# Patient Record
Sex: MALE | Race: BLACK OR AFRICAN AMERICAN | Employment: OTHER | URBAN - METROPOLITAN AREA
[De-identification: names, ages, dates, MRNs, and addresses within clinical notes are randomized per-mention and may not be internally consistent; named-entity substitution may affect disease eponyms.]

---

## 2021-01-01 ENCOUNTER — ANESTHESIA (OUTPATIENT)
Dept: ENDOSCOPY | Age: 81
DRG: 242 | End: 2021-01-01
Payer: OTHER GOVERNMENT

## 2021-01-01 ENCOUNTER — APPOINTMENT (OUTPATIENT)
Dept: GENERAL RADIOLOGY | Age: 81
DRG: 242 | End: 2021-01-01
Payer: OTHER GOVERNMENT

## 2021-01-01 ENCOUNTER — NURSE ONLY (OUTPATIENT)
Dept: CARDIOLOGY CLINIC | Age: 81
End: 2021-01-01
Payer: OTHER GOVERNMENT

## 2021-01-01 ENCOUNTER — APPOINTMENT (OUTPATIENT)
Dept: CT IMAGING | Age: 81
DRG: 641 | End: 2021-01-01
Payer: OTHER GOVERNMENT

## 2021-01-01 ENCOUNTER — ANESTHESIA EVENT (OUTPATIENT)
Dept: ENDOSCOPY | Age: 81
DRG: 242 | End: 2021-01-01
Payer: OTHER GOVERNMENT

## 2021-01-01 ENCOUNTER — APPOINTMENT (OUTPATIENT)
Dept: CT IMAGING | Age: 81
DRG: 242 | End: 2021-01-01
Payer: OTHER GOVERNMENT

## 2021-01-01 ENCOUNTER — NURSE ONLY (OUTPATIENT)
Dept: CARDIOLOGY CLINIC | Age: 81
End: 2021-01-01

## 2021-01-01 ENCOUNTER — APPOINTMENT (OUTPATIENT)
Dept: GENERAL RADIOLOGY | Age: 81
DRG: 641 | End: 2021-01-01
Payer: OTHER GOVERNMENT

## 2021-01-01 ENCOUNTER — TELEPHONE (OUTPATIENT)
Dept: CARDIOLOGY CLINIC | Age: 81
End: 2021-01-01

## 2021-01-01 ENCOUNTER — APPOINTMENT (OUTPATIENT)
Dept: MRI IMAGING | Age: 81
DRG: 242 | End: 2021-01-01
Payer: OTHER GOVERNMENT

## 2021-01-01 ENCOUNTER — HOSPITAL ENCOUNTER (INPATIENT)
Age: 81
LOS: 16 days | Discharge: NURSING, REHAB OR LONG-TERM CARE | DRG: 242 | End: 2021-08-02
Attending: EMERGENCY MEDICINE | Admitting: FAMILY MEDICINE
Payer: OTHER GOVERNMENT

## 2021-01-01 ENCOUNTER — NURSE ONLY (OUTPATIENT)
Dept: CARDIOLOGY CLINIC | Age: 81
End: 2021-01-01
Payer: MEDICARE

## 2021-01-01 ENCOUNTER — HOSPITAL ENCOUNTER (INPATIENT)
Age: 81
LOS: 2 days | Discharge: HOME HEALTH | DRG: 641 | End: 2021-04-01
Attending: EMERGENCY MEDICINE | Admitting: INTERNAL MEDICINE
Payer: OTHER GOVERNMENT

## 2021-01-01 VITALS
WEIGHT: 152.12 LBS | RESPIRATION RATE: 16 BRPM | DIASTOLIC BLOOD PRESSURE: 83 MMHG | OXYGEN SATURATION: 97 % | HEART RATE: 103 BPM | SYSTOLIC BLOOD PRESSURE: 131 MMHG | TEMPERATURE: 98.2 F | HEIGHT: 64 IN | BODY MASS INDEX: 25.97 KG/M2

## 2021-01-01 VITALS
SYSTOLIC BLOOD PRESSURE: 130 MMHG | BODY MASS INDEX: 26.27 KG/M2 | TEMPERATURE: 98.3 F | HEIGHT: 64 IN | OXYGEN SATURATION: 96 % | DIASTOLIC BLOOD PRESSURE: 74 MMHG | RESPIRATION RATE: 16 BRPM | WEIGHT: 153.9 LBS | HEART RATE: 57 BPM

## 2021-01-01 VITALS
RESPIRATION RATE: 16 BRPM | SYSTOLIC BLOOD PRESSURE: 107 MMHG | DIASTOLIC BLOOD PRESSURE: 68 MMHG | OXYGEN SATURATION: 99 %

## 2021-01-01 DIAGNOSIS — I44.1 MOBITZ II: ICD-10-CM

## 2021-01-01 DIAGNOSIS — R55 SYNCOPE AND COLLAPSE: Primary | ICD-10-CM

## 2021-01-01 DIAGNOSIS — Z79.01 ANTICOAGULATED: ICD-10-CM

## 2021-01-01 DIAGNOSIS — R55 SYNCOPE AND COLLAPSE: ICD-10-CM

## 2021-01-01 DIAGNOSIS — I44.1 AV BLOCK, MOBITZ 2: ICD-10-CM

## 2021-01-01 DIAGNOSIS — S00.03XA HEMATOMA OF SCALP, INITIAL ENCOUNTER: ICD-10-CM

## 2021-01-01 DIAGNOSIS — R79.89 ELEVATED SERUM CREATININE: ICD-10-CM

## 2021-01-01 DIAGNOSIS — Z95.0 PACEMAKER: ICD-10-CM

## 2021-01-01 DIAGNOSIS — Z95.0 PACEMAKER: Primary | ICD-10-CM

## 2021-01-01 DIAGNOSIS — Z79.01 ANTICOAGULATED ON COUMADIN: ICD-10-CM

## 2021-01-01 DIAGNOSIS — G93.89 BRAIN MASS: ICD-10-CM

## 2021-01-01 LAB
A/G RATIO: 0.8 (ref 1.1–2.2)
A/G RATIO: 1 (ref 1.1–2.2)
A/G RATIO: 1.1 (ref 1.1–2.2)
A/G RATIO: 1.2 (ref 1.1–2.2)
A/G RATIO: 1.3 (ref 1.1–2.2)
ALBUMIN SERPL-MCNC: 3.1 G/DL (ref 3.4–5)
ALBUMIN SERPL-MCNC: 3.3 G/DL (ref 3.4–5)
ALBUMIN SERPL-MCNC: 3.7 G/DL (ref 3.4–5)
ALBUMIN SERPL-MCNC: 3.9 G/DL (ref 3.4–5)
ALBUMIN SERPL-MCNC: 4 G/DL (ref 3.4–5)
ALP BLD-CCNC: 74 U/L (ref 40–129)
ALP BLD-CCNC: 87 U/L (ref 40–129)
ALP BLD-CCNC: 92 U/L (ref 40–129)
ALP BLD-CCNC: 95 U/L (ref 40–129)
ALP BLD-CCNC: 96 U/L (ref 40–129)
ALT SERPL-CCNC: 13 U/L (ref 10–40)
ALT SERPL-CCNC: 16 U/L (ref 10–40)
ALT SERPL-CCNC: 17 U/L (ref 10–40)
ALT SERPL-CCNC: 21 U/L (ref 10–40)
ALT SERPL-CCNC: 23 U/L (ref 10–40)
ANION GAP SERPL CALCULATED.3IONS-SCNC: 10 MMOL/L (ref 3–16)
ANION GAP SERPL CALCULATED.3IONS-SCNC: 11 MMOL/L (ref 3–16)
ANION GAP SERPL CALCULATED.3IONS-SCNC: 12 MMOL/L (ref 3–16)
ANION GAP SERPL CALCULATED.3IONS-SCNC: 12 MMOL/L (ref 3–16)
ANION GAP SERPL CALCULATED.3IONS-SCNC: 13 MMOL/L (ref 3–16)
ANION GAP SERPL CALCULATED.3IONS-SCNC: 14 MMOL/L (ref 3–16)
ANION GAP SERPL CALCULATED.3IONS-SCNC: 15 MMOL/L (ref 3–16)
ANION GAP SERPL CALCULATED.3IONS-SCNC: 6 MMOL/L (ref 3–16)
ANION GAP SERPL CALCULATED.3IONS-SCNC: 7 MMOL/L (ref 3–16)
ANION GAP SERPL CALCULATED.3IONS-SCNC: 8 MMOL/L (ref 3–16)
ANION GAP SERPL CALCULATED.3IONS-SCNC: 9 MMOL/L (ref 3–16)
APTT: 33.9 SEC (ref 24.2–36.2)
AST SERPL-CCNC: 16 U/L (ref 15–37)
AST SERPL-CCNC: 21 U/L (ref 15–37)
AST SERPL-CCNC: 22 U/L (ref 15–37)
AST SERPL-CCNC: 28 U/L (ref 15–37)
AST SERPL-CCNC: 36 U/L (ref 15–37)
BANDED NEUTROPHILS RELATIVE PERCENT: 1 % (ref 0–7)
BASOPHILS ABSOLUTE: 0 K/UL (ref 0–0.2)
BASOPHILS ABSOLUTE: 0.1 K/UL (ref 0–0.2)
BASOPHILS RELATIVE PERCENT: 0 %
BASOPHILS RELATIVE PERCENT: 0.1 %
BASOPHILS RELATIVE PERCENT: 0.4 %
BASOPHILS RELATIVE PERCENT: 0.5 %
BASOPHILS RELATIVE PERCENT: 0.5 %
BASOPHILS RELATIVE PERCENT: 0.6 %
BASOPHILS RELATIVE PERCENT: 0.7 %
BASOPHILS RELATIVE PERCENT: 0.8 %
BASOPHILS RELATIVE PERCENT: 1 %
BASOPHILS RELATIVE PERCENT: 1.1 %
BASOPHILS RELATIVE PERCENT: 1.2 %
BILIRUB SERPL-MCNC: 0.4 MG/DL (ref 0–1)
BILIRUB SERPL-MCNC: 0.5 MG/DL (ref 0–1)
BILIRUB SERPL-MCNC: 0.5 MG/DL (ref 0–1)
BILIRUB SERPL-MCNC: 0.6 MG/DL (ref 0–1)
BILIRUB SERPL-MCNC: 0.7 MG/DL (ref 0–1)
BILIRUBIN URINE: NEGATIVE
BILIRUBIN URINE: NEGATIVE
BLOOD CULTURE, ROUTINE: NORMAL
BLOOD, URINE: NEGATIVE
BLOOD, URINE: NEGATIVE
BUN BLDV-MCNC: 10 MG/DL (ref 7–20)
BUN BLDV-MCNC: 11 MG/DL (ref 7–20)
BUN BLDV-MCNC: 11 MG/DL (ref 7–20)
BUN BLDV-MCNC: 12 MG/DL (ref 7–20)
BUN BLDV-MCNC: 13 MG/DL (ref 7–20)
BUN BLDV-MCNC: 14 MG/DL (ref 7–20)
BUN BLDV-MCNC: 14 MG/DL (ref 7–20)
BUN BLDV-MCNC: 15 MG/DL (ref 7–20)
BUN BLDV-MCNC: 16 MG/DL (ref 7–20)
BUN BLDV-MCNC: 17 MG/DL (ref 7–20)
BUN BLDV-MCNC: 17 MG/DL (ref 7–20)
BUN BLDV-MCNC: 18 MG/DL (ref 7–20)
BUN BLDV-MCNC: 5 MG/DL (ref 7–20)
BUN BLDV-MCNC: 6 MG/DL (ref 7–20)
BUN BLDV-MCNC: 6 MG/DL (ref 7–20)
BUN BLDV-MCNC: 7 MG/DL (ref 7–20)
BUN BLDV-MCNC: 7 MG/DL (ref 7–20)
BUN BLDV-MCNC: 8 MG/DL (ref 7–20)
BUN BLDV-MCNC: 8 MG/DL (ref 7–20)
BUN BLDV-MCNC: 9 MG/DL (ref 7–20)
CALCIUM SERPL-MCNC: 10.2 MG/DL (ref 8.3–10.6)
CALCIUM SERPL-MCNC: 8.4 MG/DL (ref 8.3–10.6)
CALCIUM SERPL-MCNC: 8.5 MG/DL (ref 8.3–10.6)
CALCIUM SERPL-MCNC: 8.6 MG/DL (ref 8.3–10.6)
CALCIUM SERPL-MCNC: 8.8 MG/DL (ref 8.3–10.6)
CALCIUM SERPL-MCNC: 8.8 MG/DL (ref 8.3–10.6)
CALCIUM SERPL-MCNC: 8.9 MG/DL (ref 8.3–10.6)
CALCIUM SERPL-MCNC: 9 MG/DL (ref 8.3–10.6)
CALCIUM SERPL-MCNC: 9.1 MG/DL (ref 8.3–10.6)
CALCIUM SERPL-MCNC: 9.1 MG/DL (ref 8.3–10.6)
CALCIUM SERPL-MCNC: 9.2 MG/DL (ref 8.3–10.6)
CALCIUM SERPL-MCNC: 9.2 MG/DL (ref 8.3–10.6)
CALCIUM SERPL-MCNC: 9.3 MG/DL (ref 8.3–10.6)
CALCIUM SERPL-MCNC: 9.4 MG/DL (ref 8.3–10.6)
CALCIUM SERPL-MCNC: 9.5 MG/DL (ref 8.3–10.6)
CHLORIDE BLD-SCNC: 100 MMOL/L (ref 99–110)
CHLORIDE BLD-SCNC: 101 MMOL/L (ref 99–110)
CHLORIDE BLD-SCNC: 102 MMOL/L (ref 99–110)
CHLORIDE BLD-SCNC: 102 MMOL/L (ref 99–110)
CHLORIDE BLD-SCNC: 103 MMOL/L (ref 99–110)
CHLORIDE BLD-SCNC: 104 MMOL/L (ref 99–110)
CHLORIDE BLD-SCNC: 105 MMOL/L (ref 99–110)
CHLORIDE BLD-SCNC: 106 MMOL/L (ref 99–110)
CHLORIDE BLD-SCNC: 107 MMOL/L (ref 99–110)
CHLORIDE BLD-SCNC: 97 MMOL/L (ref 99–110)
CHLORIDE BLD-SCNC: 98 MMOL/L (ref 99–110)
CLARITY: CLEAR
CLARITY: CLEAR
CO2: 20 MMOL/L (ref 21–32)
CO2: 21 MMOL/L (ref 21–32)
CO2: 22 MMOL/L (ref 21–32)
CO2: 22 MMOL/L (ref 21–32)
CO2: 23 MMOL/L (ref 21–32)
CO2: 24 MMOL/L (ref 21–32)
CO2: 24 MMOL/L (ref 21–32)
CO2: 25 MMOL/L (ref 21–32)
CO2: 25 MMOL/L (ref 21–32)
CO2: 26 MMOL/L (ref 21–32)
CO2: 27 MMOL/L (ref 21–32)
COLOR: YELLOW
COLOR: YELLOW
COMMENT UA: NORMAL
CREAT SERPL-MCNC: 1 MG/DL (ref 0.8–1.3)
CREAT SERPL-MCNC: 1.1 MG/DL (ref 0.8–1.3)
CREAT SERPL-MCNC: 1.2 MG/DL (ref 0.8–1.3)
CREAT SERPL-MCNC: 1.3 MG/DL (ref 0.8–1.3)
CREAT SERPL-MCNC: 1.4 MG/DL (ref 0.8–1.3)
CREAT SERPL-MCNC: 1.4 MG/DL (ref 0.8–1.3)
CREAT SERPL-MCNC: 1.5 MG/DL (ref 0.8–1.3)
CREAT SERPL-MCNC: 1.7 MG/DL (ref 0.8–1.3)
CULTURE, BLOOD 2: NORMAL
EKG ATRIAL RATE: 54 BPM
EKG ATRIAL RATE: 73 BPM
EKG ATRIAL RATE: 86 BPM
EKG DIAGNOSIS: NORMAL
EKG P AXIS: 0 DEGREES
EKG P AXIS: 2 DEGREES
EKG P AXIS: 3 DEGREES
EKG P-R INTERVAL: 246 MS
EKG P-R INTERVAL: 272 MS
EKG P-R INTERVAL: 274 MS
EKG Q-T INTERVAL: 380 MS
EKG Q-T INTERVAL: 388 MS
EKG Q-T INTERVAL: 442 MS
EKG QRS DURATION: 126 MS
EKG QRS DURATION: 128 MS
EKG QRS DURATION: 128 MS
EKG QTC CALCULATION (BAZETT): 419 MS
EKG QTC CALCULATION (BAZETT): 427 MS
EKG QTC CALCULATION (BAZETT): 454 MS
EKG R AXIS: -20 DEGREES
EKG R AXIS: -24 DEGREES
EKG R AXIS: -31 DEGREES
EKG T AXIS: 132 DEGREES
EKG T AXIS: 136 DEGREES
EKG T AXIS: 147 DEGREES
EKG VENTRICULAR RATE: 54 BPM
EKG VENTRICULAR RATE: 73 BPM
EKG VENTRICULAR RATE: 86 BPM
EOSINOPHILS ABSOLUTE: 0 K/UL (ref 0–0.6)
EOSINOPHILS ABSOLUTE: 0.1 K/UL (ref 0–0.6)
EOSINOPHILS ABSOLUTE: 0.2 K/UL (ref 0–0.6)
EOSINOPHILS ABSOLUTE: 0.3 K/UL (ref 0–0.6)
EOSINOPHILS ABSOLUTE: 0.3 K/UL (ref 0–0.6)
EOSINOPHILS RELATIVE PERCENT: 0 %
EOSINOPHILS RELATIVE PERCENT: 0.1 %
EOSINOPHILS RELATIVE PERCENT: 0.1 %
EOSINOPHILS RELATIVE PERCENT: 0.7 %
EOSINOPHILS RELATIVE PERCENT: 1.4 %
EOSINOPHILS RELATIVE PERCENT: 1.8 %
EOSINOPHILS RELATIVE PERCENT: 2 %
EOSINOPHILS RELATIVE PERCENT: 2.2 %
EOSINOPHILS RELATIVE PERCENT: 2.3 %
EOSINOPHILS RELATIVE PERCENT: 2.8 %
EOSINOPHILS RELATIVE PERCENT: 3 %
EOSINOPHILS RELATIVE PERCENT: 3.1 %
EOSINOPHILS RELATIVE PERCENT: 3.4 %
EOSINOPHILS RELATIVE PERCENT: 3.6 %
EOSINOPHILS RELATIVE PERCENT: 3.8 %
EPITHELIAL CELLS, UA: 1 /HPF (ref 0–5)
GFR AFRICAN AMERICAN: 47
GFR AFRICAN AMERICAN: 54
GFR AFRICAN AMERICAN: 59
GFR AFRICAN AMERICAN: 59
GFR AFRICAN AMERICAN: >60
GFR NON-AFRICAN AMERICAN: 39
GFR NON-AFRICAN AMERICAN: 45
GFR NON-AFRICAN AMERICAN: 48
GFR NON-AFRICAN AMERICAN: 48
GFR NON-AFRICAN AMERICAN: 53
GFR NON-AFRICAN AMERICAN: 58
GFR NON-AFRICAN AMERICAN: >60
GLOBULIN: 2.9 G/DL
GLOBULIN: 3 G/DL
GLOBULIN: 3.3 G/DL
GLOBULIN: 3.7 G/DL
GLOBULIN: 3.7 G/DL
GLUCOSE BLD-MCNC: 100 MG/DL (ref 70–99)
GLUCOSE BLD-MCNC: 100 MG/DL (ref 70–99)
GLUCOSE BLD-MCNC: 101 MG/DL (ref 70–99)
GLUCOSE BLD-MCNC: 103 MG/DL (ref 70–99)
GLUCOSE BLD-MCNC: 104 MG/DL (ref 70–99)
GLUCOSE BLD-MCNC: 105 MG/DL (ref 70–99)
GLUCOSE BLD-MCNC: 106 MG/DL (ref 70–99)
GLUCOSE BLD-MCNC: 108 MG/DL (ref 70–99)
GLUCOSE BLD-MCNC: 108 MG/DL (ref 70–99)
GLUCOSE BLD-MCNC: 109 MG/DL (ref 70–99)
GLUCOSE BLD-MCNC: 110 MG/DL (ref 70–99)
GLUCOSE BLD-MCNC: 111 MG/DL (ref 70–99)
GLUCOSE BLD-MCNC: 113 MG/DL (ref 70–99)
GLUCOSE BLD-MCNC: 115 MG/DL (ref 70–99)
GLUCOSE BLD-MCNC: 115 MG/DL (ref 70–99)
GLUCOSE BLD-MCNC: 121 MG/DL (ref 70–99)
GLUCOSE BLD-MCNC: 127 MG/DL (ref 70–99)
GLUCOSE BLD-MCNC: 134 MG/DL (ref 70–99)
GLUCOSE BLD-MCNC: 152 MG/DL (ref 70–99)
GLUCOSE BLD-MCNC: 168 MG/DL (ref 70–99)
GLUCOSE BLD-MCNC: 45 MG/DL (ref 70–99)
GLUCOSE BLD-MCNC: 45 MG/DL (ref 70–99)
GLUCOSE BLD-MCNC: 48 MG/DL (ref 70–99)
GLUCOSE BLD-MCNC: 61 MG/DL (ref 70–99)
GLUCOSE BLD-MCNC: 61 MG/DL (ref 70–99)
GLUCOSE BLD-MCNC: 67 MG/DL (ref 70–99)
GLUCOSE BLD-MCNC: 68 MG/DL (ref 70–99)
GLUCOSE BLD-MCNC: 68 MG/DL (ref 70–99)
GLUCOSE BLD-MCNC: 74 MG/DL (ref 70–99)
GLUCOSE BLD-MCNC: 75 MG/DL (ref 70–99)
GLUCOSE BLD-MCNC: 75 MG/DL (ref 70–99)
GLUCOSE BLD-MCNC: 76 MG/DL (ref 70–99)
GLUCOSE BLD-MCNC: 77 MG/DL (ref 70–99)
GLUCOSE BLD-MCNC: 78 MG/DL (ref 70–99)
GLUCOSE BLD-MCNC: 79 MG/DL (ref 70–99)
GLUCOSE BLD-MCNC: 81 MG/DL (ref 70–99)
GLUCOSE BLD-MCNC: 82 MG/DL (ref 70–99)
GLUCOSE BLD-MCNC: 83 MG/DL (ref 70–99)
GLUCOSE BLD-MCNC: 83 MG/DL (ref 70–99)
GLUCOSE BLD-MCNC: 86 MG/DL (ref 70–99)
GLUCOSE BLD-MCNC: 87 MG/DL (ref 70–99)
GLUCOSE BLD-MCNC: 87 MG/DL (ref 70–99)
GLUCOSE BLD-MCNC: 88 MG/DL (ref 70–99)
GLUCOSE BLD-MCNC: 89 MG/DL (ref 70–99)
GLUCOSE BLD-MCNC: 89 MG/DL (ref 70–99)
GLUCOSE BLD-MCNC: 90 MG/DL (ref 70–99)
GLUCOSE BLD-MCNC: 90 MG/DL (ref 70–99)
GLUCOSE BLD-MCNC: 91 MG/DL (ref 70–99)
GLUCOSE BLD-MCNC: 91 MG/DL (ref 70–99)
GLUCOSE BLD-MCNC: 93 MG/DL (ref 70–99)
GLUCOSE BLD-MCNC: 94 MG/DL (ref 70–99)
GLUCOSE BLD-MCNC: 95 MG/DL (ref 70–99)
GLUCOSE BLD-MCNC: 95 MG/DL (ref 70–99)
GLUCOSE BLD-MCNC: 96 MG/DL (ref 70–99)
GLUCOSE BLD-MCNC: 96 MG/DL (ref 70–99)
GLUCOSE BLD-MCNC: 97 MG/DL (ref 70–99)
GLUCOSE BLD-MCNC: 99 MG/DL (ref 70–99)
GLUCOSE URINE: 100 MG/DL
GLUCOSE URINE: NEGATIVE MG/DL
HCT VFR BLD CALC: 34.9 % (ref 40.5–52.5)
HCT VFR BLD CALC: 34.9 % (ref 40.5–52.5)
HCT VFR BLD CALC: 36.1 % (ref 40.5–52.5)
HCT VFR BLD CALC: 36.5 % (ref 40.5–52.5)
HCT VFR BLD CALC: 36.9 % (ref 40.5–52.5)
HCT VFR BLD CALC: 37.4 % (ref 40.5–52.5)
HCT VFR BLD CALC: 37.8 % (ref 40.5–52.5)
HCT VFR BLD CALC: 38.2 % (ref 40.5–52.5)
HCT VFR BLD CALC: 39.1 % (ref 40.5–52.5)
HCT VFR BLD CALC: 39.1 % (ref 40.5–52.5)
HCT VFR BLD CALC: 39.4 % (ref 40.5–52.5)
HCT VFR BLD CALC: 39.7 % (ref 40.5–52.5)
HCT VFR BLD CALC: 39.7 % (ref 40.5–52.5)
HCT VFR BLD CALC: 40.3 % (ref 40.5–52.5)
HCT VFR BLD CALC: 41.3 % (ref 40.5–52.5)
HCT VFR BLD CALC: 42.5 % (ref 40.5–52.5)
HEMOGLOBIN: 12.1 G/DL (ref 13.5–17.5)
HEMOGLOBIN: 12.2 G/DL (ref 13.5–17.5)
HEMOGLOBIN: 12.4 G/DL (ref 13.5–17.5)
HEMOGLOBIN: 12.4 G/DL (ref 13.5–17.5)
HEMOGLOBIN: 12.6 G/DL (ref 13.5–17.5)
HEMOGLOBIN: 12.6 G/DL (ref 13.5–17.5)
HEMOGLOBIN: 12.8 G/DL (ref 13.5–17.5)
HEMOGLOBIN: 13.1 G/DL (ref 13.5–17.5)
HEMOGLOBIN: 13.4 G/DL (ref 13.5–17.5)
HEMOGLOBIN: 13.6 G/DL (ref 13.5–17.5)
HEMOGLOBIN: 13.6 G/DL (ref 13.5–17.5)
HEMOGLOBIN: 13.8 G/DL (ref 13.5–17.5)
HEMOGLOBIN: 14 G/DL (ref 13.5–17.5)
HEMOGLOBIN: 14.3 G/DL (ref 13.5–17.5)
HEMOGLOBIN: 14.3 G/DL (ref 13.5–17.5)
HEMOGLOBIN: 14.8 G/DL (ref 13.5–17.5)
HYALINE CASTS: 0 /LPF (ref 0–8)
INR BLD: 1.66 (ref 0.86–1.14)
INR BLD: 1.86 (ref 0.86–1.14)
INR BLD: 1.99 (ref 0.86–1.14)
KETONES, URINE: 40 MG/DL
KETONES, URINE: NEGATIVE MG/DL
L. PNEUMOPHILA SEROGP 1 UR AG: NORMAL
LEUKOCYTE ESTERASE, URINE: NEGATIVE
LEUKOCYTE ESTERASE, URINE: NEGATIVE
LV EF: 45 %
LV EF: 63 %
LVEF MODALITY: NORMAL
LVEF MODALITY: NORMAL
LYMPHOCYTES ABSOLUTE: 1.2 K/UL (ref 1–5.1)
LYMPHOCYTES ABSOLUTE: 1.2 K/UL (ref 1–5.1)
LYMPHOCYTES ABSOLUTE: 1.3 K/UL (ref 1–5.1)
LYMPHOCYTES ABSOLUTE: 1.3 K/UL (ref 1–5.1)
LYMPHOCYTES ABSOLUTE: 1.5 K/UL (ref 1–5.1)
LYMPHOCYTES ABSOLUTE: 1.6 K/UL (ref 1–5.1)
LYMPHOCYTES ABSOLUTE: 1.6 K/UL (ref 1–5.1)
LYMPHOCYTES ABSOLUTE: 1.7 K/UL (ref 1–5.1)
LYMPHOCYTES ABSOLUTE: 1.7 K/UL (ref 1–5.1)
LYMPHOCYTES ABSOLUTE: 1.8 K/UL (ref 1–5.1)
LYMPHOCYTES ABSOLUTE: 1.9 K/UL (ref 1–5.1)
LYMPHOCYTES ABSOLUTE: 2.1 K/UL (ref 1–5.1)
LYMPHOCYTES ABSOLUTE: 2.9 K/UL (ref 1–5.1)
LYMPHOCYTES RELATIVE PERCENT: 15.5 %
LYMPHOCYTES RELATIVE PERCENT: 18 %
LYMPHOCYTES RELATIVE PERCENT: 18.1 %
LYMPHOCYTES RELATIVE PERCENT: 19 %
LYMPHOCYTES RELATIVE PERCENT: 20 %
LYMPHOCYTES RELATIVE PERCENT: 20.1 %
LYMPHOCYTES RELATIVE PERCENT: 20.4 %
LYMPHOCYTES RELATIVE PERCENT: 21.8 %
LYMPHOCYTES RELATIVE PERCENT: 22.3 %
LYMPHOCYTES RELATIVE PERCENT: 24.3 %
LYMPHOCYTES RELATIVE PERCENT: 24.6 %
LYMPHOCYTES RELATIVE PERCENT: 25.2 %
LYMPHOCYTES RELATIVE PERCENT: 25.2 %
LYMPHOCYTES RELATIVE PERCENT: 30.9 %
LYMPHOCYTES RELATIVE PERCENT: 47.6 %
MAGNESIUM: 1.4 MG/DL (ref 1.8–2.4)
MAGNESIUM: 1.7 MG/DL (ref 1.8–2.4)
MAGNESIUM: 1.8 MG/DL (ref 1.8–2.4)
MAGNESIUM: 1.9 MG/DL (ref 1.8–2.4)
MAGNESIUM: 2 MG/DL (ref 1.8–2.4)
MAGNESIUM: 2.2 MG/DL (ref 1.8–2.4)
MCH RBC QN AUTO: 31.8 PG (ref 26–34)
MCH RBC QN AUTO: 31.8 PG (ref 26–34)
MCH RBC QN AUTO: 31.9 PG (ref 26–34)
MCH RBC QN AUTO: 32 PG (ref 26–34)
MCH RBC QN AUTO: 32 PG (ref 26–34)
MCH RBC QN AUTO: 32.1 PG (ref 26–34)
MCH RBC QN AUTO: 32.1 PG (ref 26–34)
MCH RBC QN AUTO: 32.2 PG (ref 26–34)
MCH RBC QN AUTO: 32.3 PG (ref 26–34)
MCH RBC QN AUTO: 32.5 PG (ref 26–34)
MCH RBC QN AUTO: 32.5 PG (ref 26–34)
MCH RBC QN AUTO: 32.7 PG (ref 26–34)
MCH RBC QN AUTO: 32.7 PG (ref 26–34)
MCH RBC QN AUTO: 32.8 PG (ref 26–34)
MCHC RBC AUTO-ENTMCNC: 33.6 G/DL (ref 31–36)
MCHC RBC AUTO-ENTMCNC: 33.7 G/DL (ref 31–36)
MCHC RBC AUTO-ENTMCNC: 34 G/DL (ref 31–36)
MCHC RBC AUTO-ENTMCNC: 34.2 G/DL (ref 31–36)
MCHC RBC AUTO-ENTMCNC: 34.3 G/DL (ref 31–36)
MCHC RBC AUTO-ENTMCNC: 34.3 G/DL (ref 31–36)
MCHC RBC AUTO-ENTMCNC: 34.4 G/DL (ref 31–36)
MCHC RBC AUTO-ENTMCNC: 34.5 G/DL (ref 31–36)
MCHC RBC AUTO-ENTMCNC: 34.6 G/DL (ref 31–36)
MCHC RBC AUTO-ENTMCNC: 34.7 G/DL (ref 31–36)
MCHC RBC AUTO-ENTMCNC: 34.9 G/DL (ref 31–36)
MCHC RBC AUTO-ENTMCNC: 35.2 G/DL (ref 31–36)
MCHC RBC AUTO-ENTMCNC: 35.2 G/DL (ref 31–36)
MCHC RBC AUTO-ENTMCNC: 35.5 G/DL (ref 31–36)
MCV RBC AUTO: 91.9 FL (ref 80–100)
MCV RBC AUTO: 92.3 FL (ref 80–100)
MCV RBC AUTO: 92.5 FL (ref 80–100)
MCV RBC AUTO: 92.5 FL (ref 80–100)
MCV RBC AUTO: 92.7 FL (ref 80–100)
MCV RBC AUTO: 92.8 FL (ref 80–100)
MCV RBC AUTO: 92.8 FL (ref 80–100)
MCV RBC AUTO: 92.9 FL (ref 80–100)
MCV RBC AUTO: 92.9 FL (ref 80–100)
MCV RBC AUTO: 93.3 FL (ref 80–100)
MCV RBC AUTO: 93.5 FL (ref 80–100)
MCV RBC AUTO: 93.9 FL (ref 80–100)
MCV RBC AUTO: 94.3 FL (ref 80–100)
MCV RBC AUTO: 94.5 FL (ref 80–100)
MCV RBC AUTO: 94.7 FL (ref 80–100)
MCV RBC AUTO: 94.8 FL (ref 80–100)
MICROSCOPIC EXAMINATION: ABNORMAL
MICROSCOPIC EXAMINATION: YES
MONOCYTES ABSOLUTE: 0.4 K/UL (ref 0–1.3)
MONOCYTES ABSOLUTE: 0.5 K/UL (ref 0–1.3)
MONOCYTES ABSOLUTE: 0.5 K/UL (ref 0–1.3)
MONOCYTES ABSOLUTE: 0.6 K/UL (ref 0–1.3)
MONOCYTES ABSOLUTE: 0.7 K/UL (ref 0–1.3)
MONOCYTES ABSOLUTE: 0.7 K/UL (ref 0–1.3)
MONOCYTES ABSOLUTE: 0.8 K/UL (ref 0–1.3)
MONOCYTES ABSOLUTE: 0.9 K/UL (ref 0–1.3)
MONOCYTES ABSOLUTE: 1 K/UL (ref 0–1.3)
MONOCYTES RELATIVE PERCENT: 10.1 %
MONOCYTES RELATIVE PERCENT: 10.2 %
MONOCYTES RELATIVE PERCENT: 11.4 %
MONOCYTES RELATIVE PERCENT: 12.9 %
MONOCYTES RELATIVE PERCENT: 6 %
MONOCYTES RELATIVE PERCENT: 6 %
MONOCYTES RELATIVE PERCENT: 6.1 %
MONOCYTES RELATIVE PERCENT: 7.1 %
MONOCYTES RELATIVE PERCENT: 7.2 %
MONOCYTES RELATIVE PERCENT: 8.3 %
MONOCYTES RELATIVE PERCENT: 8.4 %
MONOCYTES RELATIVE PERCENT: 9.2 %
MONOCYTES RELATIVE PERCENT: 9.4 %
MONOCYTES RELATIVE PERCENT: 9.7 %
MONOCYTES RELATIVE PERCENT: 9.8 %
MRSA SCREEN RT-PCR: NORMAL
NEUTROPHILS ABSOLUTE: 2.7 K/UL (ref 1.7–7.7)
NEUTROPHILS ABSOLUTE: 3.3 K/UL (ref 1.7–7.7)
NEUTROPHILS ABSOLUTE: 3.7 K/UL (ref 1.7–7.7)
NEUTROPHILS ABSOLUTE: 3.8 K/UL (ref 1.7–7.7)
NEUTROPHILS ABSOLUTE: 3.8 K/UL (ref 1.7–7.7)
NEUTROPHILS ABSOLUTE: 4.3 K/UL (ref 1.7–7.7)
NEUTROPHILS ABSOLUTE: 4.4 K/UL (ref 1.7–7.7)
NEUTROPHILS ABSOLUTE: 4.4 K/UL (ref 1.7–7.7)
NEUTROPHILS ABSOLUTE: 4.8 K/UL (ref 1.7–7.7)
NEUTROPHILS ABSOLUTE: 5.5 K/UL (ref 1.7–7.7)
NEUTROPHILS ABSOLUTE: 5.6 K/UL (ref 1.7–7.7)
NEUTROPHILS ABSOLUTE: 5.7 K/UL (ref 1.7–7.7)
NEUTROPHILS ABSOLUTE: 6.3 K/UL (ref 1.7–7.7)
NEUTROPHILS ABSOLUTE: 6.3 K/UL (ref 1.7–7.7)
NEUTROPHILS ABSOLUTE: 6.4 K/UL (ref 1.7–7.7)
NEUTROPHILS RELATIVE PERCENT: 44.3 %
NEUTROPHILS RELATIVE PERCENT: 57.5 %
NEUTROPHILS RELATIVE PERCENT: 59 %
NEUTROPHILS RELATIVE PERCENT: 62.4 %
NEUTROPHILS RELATIVE PERCENT: 62.4 %
NEUTROPHILS RELATIVE PERCENT: 65.1 %
NEUTROPHILS RELATIVE PERCENT: 65.2 %
NEUTROPHILS RELATIVE PERCENT: 66.9 %
NEUTROPHILS RELATIVE PERCENT: 68.1 %
NEUTROPHILS RELATIVE PERCENT: 69.1 %
NEUTROPHILS RELATIVE PERCENT: 69.3 %
NEUTROPHILS RELATIVE PERCENT: 69.4 %
NEUTROPHILS RELATIVE PERCENT: 71.1 %
NEUTROPHILS RELATIVE PERCENT: 71.2 %
NEUTROPHILS RELATIVE PERCENT: 73 %
NITRITE, URINE: NEGATIVE
NITRITE, URINE: NEGATIVE
PDW BLD-RTO: 13.4 % (ref 12.4–15.4)
PDW BLD-RTO: 13.5 % (ref 12.4–15.4)
PDW BLD-RTO: 13.6 % (ref 12.4–15.4)
PDW BLD-RTO: 13.7 % (ref 12.4–15.4)
PDW BLD-RTO: 14.1 % (ref 12.4–15.4)
PDW BLD-RTO: 14.2 % (ref 12.4–15.4)
PDW BLD-RTO: 14.2 % (ref 12.4–15.4)
PDW BLD-RTO: 14.6 % (ref 12.4–15.4)
PERFORMED ON: ABNORMAL
PERFORMED ON: NORMAL
PH UA: 5.5 (ref 5–8)
PH UA: 6 (ref 5–8)
PHOSPHORUS: 2.5 MG/DL (ref 2.5–4.9)
PHOSPHORUS: 3 MG/DL (ref 2.5–4.9)
PHOSPHORUS: 3 MG/DL (ref 2.5–4.9)
PLATELET # BLD: 112 K/UL (ref 135–450)
PLATELET # BLD: 120 K/UL (ref 135–450)
PLATELET # BLD: 122 K/UL (ref 135–450)
PLATELET # BLD: 123 K/UL (ref 135–450)
PLATELET # BLD: 128 K/UL (ref 135–450)
PLATELET # BLD: 147 K/UL (ref 135–450)
PLATELET # BLD: 150 K/UL (ref 135–450)
PLATELET # BLD: 153 K/UL (ref 135–450)
PLATELET # BLD: 162 K/UL (ref 135–450)
PLATELET # BLD: 167 K/UL (ref 135–450)
PLATELET # BLD: 176 K/UL (ref 135–450)
PLATELET # BLD: 177 K/UL (ref 135–450)
PLATELET # BLD: 177 K/UL (ref 135–450)
PLATELET # BLD: 178 K/UL (ref 135–450)
PLATELET # BLD: 205 K/UL (ref 135–450)
PLATELET # BLD: 329 K/UL (ref 135–450)
PMV BLD AUTO: 6.6 FL (ref 5–10.5)
PMV BLD AUTO: 6.7 FL (ref 5–10.5)
PMV BLD AUTO: 6.8 FL (ref 5–10.5)
PMV BLD AUTO: 6.9 FL (ref 5–10.5)
PMV BLD AUTO: 7 FL (ref 5–10.5)
PMV BLD AUTO: 7.1 FL (ref 5–10.5)
PMV BLD AUTO: 7.3 FL (ref 5–10.5)
PMV BLD AUTO: 7.4 FL (ref 5–10.5)
PMV BLD AUTO: 7.5 FL (ref 5–10.5)
PMV BLD AUTO: 7.5 FL (ref 5–10.5)
PMV BLD AUTO: 7.6 FL (ref 5–10.5)
PMV BLD AUTO: 7.6 FL (ref 5–10.5)
PMV BLD AUTO: 7.7 FL (ref 5–10.5)
POTASSIUM REFLEX MAGNESIUM: 3.8 MMOL/L (ref 3.5–5.1)
POTASSIUM REFLEX MAGNESIUM: 4.3 MMOL/L (ref 3.5–5.1)
POTASSIUM SERPL-SCNC: 3.3 MMOL/L (ref 3.5–5.1)
POTASSIUM SERPL-SCNC: 3.4 MMOL/L (ref 3.5–5.1)
POTASSIUM SERPL-SCNC: 3.5 MMOL/L (ref 3.5–5.1)
POTASSIUM SERPL-SCNC: 3.5 MMOL/L (ref 3.5–5.1)
POTASSIUM SERPL-SCNC: 3.6 MMOL/L (ref 3.5–5.1)
POTASSIUM SERPL-SCNC: 3.7 MMOL/L (ref 3.5–5.1)
POTASSIUM SERPL-SCNC: 3.8 MMOL/L (ref 3.5–5.1)
POTASSIUM SERPL-SCNC: 3.9 MMOL/L (ref 3.5–5.1)
POTASSIUM SERPL-SCNC: 4 MMOL/L (ref 3.5–5.1)
POTASSIUM SERPL-SCNC: 4.1 MMOL/L (ref 3.5–5.1)
POTASSIUM SERPL-SCNC: 4.2 MMOL/L (ref 3.5–5.1)
POTASSIUM SERPL-SCNC: 4.3 MMOL/L (ref 3.5–5.1)
POTASSIUM SERPL-SCNC: 4.3 MMOL/L (ref 3.5–5.1)
POTASSIUM SERPL-SCNC: 4.9 MMOL/L (ref 3.5–5.1)
PRO-BNP: 147 PG/ML (ref 0–449)
PROCALCITONIN: 0.13 NG/ML (ref 0–0.15)
PROTEIN UA: 30 MG/DL
PROTEIN UA: NEGATIVE MG/DL
PROTHROMBIN TIME: 19.4 SEC (ref 10–13.2)
PROTHROMBIN TIME: 21.7 SEC (ref 10–13.2)
PROTHROMBIN TIME: 23.3 SEC (ref 10–13.2)
RBC # BLD: 3.77 M/UL (ref 4.2–5.9)
RBC # BLD: 3.77 M/UL (ref 4.2–5.9)
RBC # BLD: 3.89 M/UL (ref 4.2–5.9)
RBC # BLD: 3.9 M/UL (ref 4.2–5.9)
RBC # BLD: 3.95 M/UL (ref 4.2–5.9)
RBC # BLD: 3.96 M/UL (ref 4.2–5.9)
RBC # BLD: 4 M/UL (ref 4.2–5.9)
RBC # BLD: 4.03 M/UL (ref 4.2–5.9)
RBC # BLD: 4.17 M/UL (ref 4.2–5.9)
RBC # BLD: 4.22 M/UL (ref 4.2–5.9)
RBC # BLD: 4.24 M/UL (ref 4.2–5.9)
RBC # BLD: 4.25 M/UL (ref 4.2–5.9)
RBC # BLD: 4.28 M/UL (ref 4.2–5.9)
RBC # BLD: 4.38 M/UL (ref 4.2–5.9)
RBC # BLD: 4.45 M/UL (ref 4.2–5.9)
RBC # BLD: 4.55 M/UL (ref 4.2–5.9)
RBC UA: 3 /HPF (ref 0–4)
SARS-COV-2, NAAT: NOT DETECTED
SODIUM BLD-SCNC: 132 MMOL/L (ref 136–145)
SODIUM BLD-SCNC: 133 MMOL/L (ref 136–145)
SODIUM BLD-SCNC: 134 MMOL/L (ref 136–145)
SODIUM BLD-SCNC: 135 MMOL/L (ref 136–145)
SODIUM BLD-SCNC: 136 MMOL/L (ref 136–145)
SODIUM BLD-SCNC: 137 MMOL/L (ref 136–145)
SODIUM BLD-SCNC: 137 MMOL/L (ref 136–145)
SODIUM BLD-SCNC: 138 MMOL/L (ref 136–145)
SODIUM BLD-SCNC: 139 MMOL/L (ref 136–145)
SPECIFIC GRAVITY UA: 1.01 (ref 1–1.03)
SPECIFIC GRAVITY UA: 1.01 (ref 1–1.03)
STREP PNEUMONIAE ANTIGEN, URINE: NORMAL
TOTAL PROTEIN: 6.6 G/DL (ref 6.4–8.2)
TOTAL PROTEIN: 6.7 G/DL (ref 6.4–8.2)
TOTAL PROTEIN: 6.8 G/DL (ref 6.4–8.2)
TOTAL PROTEIN: 6.8 G/DL (ref 6.4–8.2)
TOTAL PROTEIN: 7.7 G/DL (ref 6.4–8.2)
TROPONIN: <0.01 NG/ML
URINE REFLEX TO CULTURE: ABNORMAL
URINE REFLEX TO CULTURE: ABNORMAL
URINE TYPE: ABNORMAL
URINE TYPE: ABNORMAL
UROBILINOGEN, URINE: 0.2 E.U./DL
UROBILINOGEN, URINE: 1 E.U./DL
VANCOMYCIN RANDOM: 13.5 UG/ML
VANCOMYCIN RANDOM: 15.3 UG/ML
VANCOMYCIN TROUGH: 17.2 UG/ML (ref 10–20)
WBC # BLD: 5.7 K/UL (ref 4–11)
WBC # BLD: 5.9 K/UL (ref 4–11)
WBC # BLD: 6 K/UL (ref 4–11)
WBC # BLD: 6.1 K/UL (ref 4–11)
WBC # BLD: 6.1 K/UL (ref 4–11)
WBC # BLD: 6.4 K/UL (ref 4–11)
WBC # BLD: 6.8 K/UL (ref 4–11)
WBC # BLD: 6.9 K/UL (ref 4–11)
WBC # BLD: 7 K/UL (ref 4–11)
WBC # BLD: 7.2 K/UL (ref 4–11)
WBC # BLD: 7.5 K/UL (ref 4–11)
WBC # BLD: 8 K/UL (ref 4–11)
WBC # BLD: 8.1 K/UL (ref 4–11)
WBC # BLD: 9.1 K/UL (ref 4–11)
WBC # BLD: 9.3 K/UL (ref 4–11)
WBC # BLD: 9.5 K/UL (ref 4–11)
WBC UA: 1 /HPF (ref 0–5)

## 2021-01-01 PROCEDURE — 97165 OT EVAL LOW COMPLEX 30 MIN: CPT

## 2021-01-01 PROCEDURE — 80048 BASIC METABOLIC PNL TOTAL CA: CPT

## 2021-01-01 PROCEDURE — 78452 HT MUSCLE IMAGE SPECT MULT: CPT | Performed by: INTERNAL MEDICINE

## 2021-01-01 PROCEDURE — 97530 THERAPEUTIC ACTIVITIES: CPT

## 2021-01-01 PROCEDURE — 99153 MOD SED SAME PHYS/QHP EA: CPT

## 2021-01-01 PROCEDURE — 85025 COMPLETE CBC W/AUTO DIFF WBC: CPT

## 2021-01-01 PROCEDURE — 1200000000 HC SEMI PRIVATE

## 2021-01-01 PROCEDURE — 2580000003 HC RX 258: Performed by: INTERNAL MEDICINE

## 2021-01-01 PROCEDURE — 70551 MRI BRAIN STEM W/O DYE: CPT

## 2021-01-01 PROCEDURE — 72125 CT NECK SPINE W/O DYE: CPT

## 2021-01-01 PROCEDURE — 6370000000 HC RX 637 (ALT 250 FOR IP): Performed by: FAMILY MEDICINE

## 2021-01-01 PROCEDURE — 99232 SBSQ HOSP IP/OBS MODERATE 35: CPT | Performed by: SURGERY

## 2021-01-01 PROCEDURE — 97116 GAIT TRAINING THERAPY: CPT

## 2021-01-01 PROCEDURE — 6360000002 HC RX W HCPCS: Performed by: INTERNAL MEDICINE

## 2021-01-01 PROCEDURE — 36415 COLL VENOUS BLD VENIPUNCTURE: CPT

## 2021-01-01 PROCEDURE — 71045 X-RAY EXAM CHEST 1 VIEW: CPT

## 2021-01-01 PROCEDURE — 81003 URINALYSIS AUTO W/O SCOPE: CPT

## 2021-01-01 PROCEDURE — 2060000000 HC ICU INTERMEDIATE R&B

## 2021-01-01 PROCEDURE — 83735 ASSAY OF MAGNESIUM: CPT

## 2021-01-01 PROCEDURE — 85610 PROTHROMBIN TIME: CPT

## 2021-01-01 PROCEDURE — 6370000000 HC RX 637 (ALT 250 FOR IP): Performed by: INTERNAL MEDICINE

## 2021-01-01 PROCEDURE — 93280 PM DEVICE PROGR EVAL DUAL: CPT | Performed by: INTERNAL MEDICINE

## 2021-01-01 PROCEDURE — 87635 SARS-COV-2 COVID-19 AMP PRB: CPT

## 2021-01-01 PROCEDURE — 2500000003 HC RX 250 WO HCPCS: Performed by: INTERNAL MEDICINE

## 2021-01-01 PROCEDURE — 6360000002 HC RX W HCPCS: Performed by: NURSE PRACTITIONER

## 2021-01-01 PROCEDURE — 74018 RADEX ABDOMEN 1 VIEW: CPT

## 2021-01-01 PROCEDURE — 93010 ELECTROCARDIOGRAM REPORT: CPT | Performed by: INTERNAL MEDICINE

## 2021-01-01 PROCEDURE — 2580000003 HC RX 258: Performed by: FAMILY MEDICINE

## 2021-01-01 PROCEDURE — 97535 SELF CARE MNGMENT TRAINING: CPT

## 2021-01-01 PROCEDURE — 99223 1ST HOSP IP/OBS HIGH 75: CPT | Performed by: INTERNAL MEDICINE

## 2021-01-01 PROCEDURE — 99232 SBSQ HOSP IP/OBS MODERATE 35: CPT | Performed by: INTERNAL MEDICINE

## 2021-01-01 PROCEDURE — 74240 X-RAY XM UPR GI TRC 1CNTRST: CPT

## 2021-01-01 PROCEDURE — 2709999900 HC NON-CHARGEABLE SUPPLY: Performed by: INTERNAL MEDICINE

## 2021-01-01 PROCEDURE — 2580000003 HC RX 258: Performed by: REGISTERED NURSE

## 2021-01-01 PROCEDURE — 02HK3JZ INSERTION OF PACEMAKER LEAD INTO RIGHT VENTRICLE, PERCUTANEOUS APPROACH: ICD-10-PCS | Performed by: INTERNAL MEDICINE

## 2021-01-01 PROCEDURE — 70450 CT HEAD/BRAIN W/O DYE: CPT

## 2021-01-01 PROCEDURE — 94760 N-INVAS EAR/PLS OXIMETRY 1: CPT

## 2021-01-01 PROCEDURE — 6360000002 HC RX W HCPCS: Performed by: REGISTERED NURSE

## 2021-01-01 PROCEDURE — 84145 PROCALCITONIN (PCT): CPT

## 2021-01-01 PROCEDURE — APPNB30 APP NON BILLABLE TIME 0-30 MINS: Performed by: NURSE PRACTITIONER

## 2021-01-01 PROCEDURE — C9113 INJ PANTOPRAZOLE SODIUM, VIA: HCPCS | Performed by: PHYSICIAN ASSISTANT

## 2021-01-01 PROCEDURE — 2580000003 HC RX 258: Performed by: NURSE PRACTITIONER

## 2021-01-01 PROCEDURE — 80053 COMPREHEN METABOLIC PANEL: CPT

## 2021-01-01 PROCEDURE — 6360000004 HC RX CONTRAST MEDICATION

## 2021-01-01 PROCEDURE — 2580000003 HC RX 258: Performed by: EMERGENCY MEDICINE

## 2021-01-01 PROCEDURE — 2580000003 HC RX 258

## 2021-01-01 PROCEDURE — 93306 TTE W/DOPPLER COMPLETE: CPT

## 2021-01-01 PROCEDURE — 6360000002 HC RX W HCPCS: Performed by: PHYSICIAN ASSISTANT

## 2021-01-01 PROCEDURE — 80202 ASSAY OF VANCOMYCIN: CPT

## 2021-01-01 PROCEDURE — 6370000000 HC RX 637 (ALT 250 FOR IP): Performed by: NURSE PRACTITIONER

## 2021-01-01 PROCEDURE — 85027 COMPLETE CBC AUTOMATED: CPT

## 2021-01-01 PROCEDURE — 97166 OT EVAL MOD COMPLEX 45 MIN: CPT

## 2021-01-01 PROCEDURE — 84484 ASSAY OF TROPONIN QUANT: CPT

## 2021-01-01 PROCEDURE — 70486 CT MAXILLOFACIAL W/O DYE: CPT

## 2021-01-01 PROCEDURE — 74019 RADEX ABDOMEN 2 VIEWS: CPT

## 2021-01-01 PROCEDURE — 87040 BLOOD CULTURE FOR BACTERIA: CPT

## 2021-01-01 PROCEDURE — 97110 THERAPEUTIC EXERCISES: CPT

## 2021-01-01 PROCEDURE — 99222 1ST HOSP IP/OBS MODERATE 55: CPT | Performed by: SURGERY

## 2021-01-01 PROCEDURE — 93296 REM INTERROG EVL PM/IDS: CPT | Performed by: INTERNAL MEDICINE

## 2021-01-01 PROCEDURE — 85730 THROMBOPLASTIN TIME PARTIAL: CPT

## 2021-01-01 PROCEDURE — 99232 SBSQ HOSP IP/OBS MODERATE 35: CPT | Performed by: PSYCHIATRY & NEUROLOGY

## 2021-01-01 PROCEDURE — 6360000002 HC RX W HCPCS: Performed by: ANESTHESIOLOGY

## 2021-01-01 PROCEDURE — 83880 ASSAY OF NATRIURETIC PEPTIDE: CPT

## 2021-01-01 PROCEDURE — APPSS15 APP SPLIT SHARED TIME 0-15 MINUTES: Performed by: NURSE PRACTITIONER

## 2021-01-01 PROCEDURE — 6360000002 HC RX W HCPCS

## 2021-01-01 PROCEDURE — 99223 1ST HOSP IP/OBS HIGH 75: CPT | Performed by: PSYCHIATRY & NEUROLOGY

## 2021-01-01 PROCEDURE — 84100 ASSAY OF PHOSPHORUS: CPT

## 2021-01-01 PROCEDURE — 93005 ELECTROCARDIOGRAM TRACING: CPT | Performed by: INTERNAL MEDICINE

## 2021-01-01 PROCEDURE — 0DJ08ZZ INSPECTION OF UPPER INTESTINAL TRACT, VIA NATURAL OR ARTIFICIAL OPENING ENDOSCOPIC: ICD-10-PCS | Performed by: INTERNAL MEDICINE

## 2021-01-01 PROCEDURE — 97161 PT EVAL LOW COMPLEX 20 MIN: CPT

## 2021-01-01 PROCEDURE — 93017 CV STRESS TEST TRACING ONLY: CPT | Performed by: INTERNAL MEDICINE

## 2021-01-01 PROCEDURE — 99284 EMERGENCY DEPT VISIT MOD MDM: CPT

## 2021-01-01 PROCEDURE — 7100000000 HC PACU RECOVERY - FIRST 15 MIN: Performed by: INTERNAL MEDICINE

## 2021-01-01 PROCEDURE — 7100000001 HC PACU RECOVERY - ADDTL 15 MIN: Performed by: INTERNAL MEDICINE

## 2021-01-01 PROCEDURE — 87641 MR-STAPH DNA AMP PROBE: CPT

## 2021-01-01 PROCEDURE — 6360000004 HC RX CONTRAST MEDICATION: Performed by: NURSE PRACTITIONER

## 2021-01-01 PROCEDURE — 87449 NOS EACH ORGANISM AG IA: CPT

## 2021-01-01 PROCEDURE — 3430000000 HC RX DIAGNOSTIC RADIOPHARMACEUTICAL: Performed by: INTERNAL MEDICINE

## 2021-01-01 PROCEDURE — C1898 LEAD, PMKR, OTHER THAN TRANS: HCPCS

## 2021-01-01 PROCEDURE — 99233 SBSQ HOSP IP/OBS HIGH 50: CPT | Performed by: INTERNAL MEDICINE

## 2021-01-01 PROCEDURE — C1785 PMKR, DUAL, RATE-RESP: HCPCS

## 2021-01-01 PROCEDURE — 3700000000 HC ANESTHESIA ATTENDED CARE: Performed by: INTERNAL MEDICINE

## 2021-01-01 PROCEDURE — C1894 INTRO/SHEATH, NON-LASER: HCPCS

## 2021-01-01 PROCEDURE — 74177 CT ABD & PELVIS W/CONTRAST: CPT

## 2021-01-01 PROCEDURE — 33208 INSRT HEART PM ATRIAL & VENT: CPT | Performed by: INTERNAL MEDICINE

## 2021-01-01 PROCEDURE — 99233 SBSQ HOSP IP/OBS HIGH 50: CPT | Performed by: NURSE PRACTITIONER

## 2021-01-01 PROCEDURE — 81001 URINALYSIS AUTO W/SCOPE: CPT

## 2021-01-01 PROCEDURE — 6360000002 HC RX W HCPCS: Performed by: FAMILY MEDICINE

## 2021-01-01 PROCEDURE — 3609017100 HC EGD: Performed by: INTERNAL MEDICINE

## 2021-01-01 PROCEDURE — 2500000003 HC RX 250 WO HCPCS

## 2021-01-01 PROCEDURE — A9502 TC99M TETROFOSMIN: HCPCS | Performed by: INTERNAL MEDICINE

## 2021-01-01 PROCEDURE — 93294 REM INTERROG EVL PM/LDLS PM: CPT | Performed by: INTERNAL MEDICINE

## 2021-01-01 PROCEDURE — 02H63JZ INSERTION OF PACEMAKER LEAD INTO RIGHT ATRIUM, PERCUTANEOUS APPROACH: ICD-10-PCS | Performed by: INTERNAL MEDICINE

## 2021-01-01 PROCEDURE — 2500000003 HC RX 250 WO HCPCS: Performed by: REGISTERED NURSE

## 2021-01-01 PROCEDURE — 0JH606Z INSERTION OF PACEMAKER, DUAL CHAMBER INTO CHEST SUBCUTANEOUS TISSUE AND FASCIA, OPEN APPROACH: ICD-10-PCS | Performed by: INTERNAL MEDICINE

## 2021-01-01 PROCEDURE — 99152 MOD SED SAME PHYS/QHP 5/>YRS: CPT

## 2021-01-01 PROCEDURE — P9045 ALBUMIN (HUMAN), 5%, 250 ML: HCPCS | Performed by: ANESTHESIOLOGY

## 2021-01-01 PROCEDURE — 99152 MOD SED SAME PHYS/QHP 5/>YRS: CPT | Performed by: INTERNAL MEDICINE

## 2021-01-01 PROCEDURE — 97162 PT EVAL MOD COMPLEX 30 MIN: CPT

## 2021-01-01 PROCEDURE — 99024 POSTOP FOLLOW-UP VISIT: CPT | Performed by: INTERNAL MEDICINE

## 2021-01-01 PROCEDURE — 93005 ELECTROCARDIOGRAM TRACING: CPT | Performed by: EMERGENCY MEDICINE

## 2021-01-01 PROCEDURE — 3700000001 HC ADD 15 MINUTES (ANESTHESIA): Performed by: INTERNAL MEDICINE

## 2021-01-01 PROCEDURE — 71250 CT THORAX DX C-: CPT

## 2021-01-01 PROCEDURE — 33208 INSRT HEART PM ATRIAL & VENT: CPT

## 2021-01-01 RX ORDER — SODIUM CHLORIDE 9 MG/ML
INJECTION, SOLUTION INTRAVENOUS CONTINUOUS
Status: DISCONTINUED | OUTPATIENT
Start: 2021-01-01 | End: 2021-01-01

## 2021-01-01 RX ORDER — POLYETHYLENE GLYCOL 3350 17 G/17G
17 POWDER, FOR SOLUTION ORAL DAILY
Status: DISCONTINUED | OUTPATIENT
Start: 2021-01-01 | End: 2021-01-01

## 2021-01-01 RX ORDER — PROPOFOL 10 MG/ML
INJECTION, EMULSION INTRAVENOUS CONTINUOUS PRN
Status: DISCONTINUED | OUTPATIENT
Start: 2021-01-01 | End: 2021-01-01 | Stop reason: SDUPTHER

## 2021-01-01 RX ORDER — AMINOPHYLLINE DIHYDRATE 25 MG/ML
100 INJECTION, SOLUTION INTRAVENOUS ONCE
Status: COMPLETED | OUTPATIENT
Start: 2021-01-01 | End: 2021-01-01

## 2021-01-01 RX ORDER — OXYBUTYNIN CHLORIDE 5 MG/1
5 TABLET, EXTENDED RELEASE ORAL DAILY
COMMUNITY

## 2021-01-01 RX ORDER — CIPROFLOXACIN 500 MG/1
500 TABLET, FILM COATED ORAL EVERY 12 HOURS SCHEDULED
Status: COMPLETED | OUTPATIENT
Start: 2021-01-01 | End: 2021-01-01

## 2021-01-01 RX ORDER — TROSPIUM CHLORIDE 20 MG/1
20 TABLET, FILM COATED ORAL 2 TIMES DAILY
Status: DISCONTINUED | OUTPATIENT
Start: 2021-01-01 | End: 2021-01-01 | Stop reason: HOSPADM

## 2021-01-01 RX ORDER — FENTANYL CITRATE 50 UG/ML
50 INJECTION, SOLUTION INTRAMUSCULAR; INTRAVENOUS EVERY 5 MIN PRN
Status: DISCONTINUED | OUTPATIENT
Start: 2021-01-01 | End: 2021-01-01 | Stop reason: HOSPADM

## 2021-01-01 RX ORDER — TROSPIUM CHLORIDE 20 MG/1
20 TABLET, FILM COATED ORAL 2 TIMES DAILY
COMMUNITY

## 2021-01-01 RX ORDER — METRONIDAZOLE 500 MG/1
500 TABLET ORAL EVERY 8 HOURS SCHEDULED
Qty: 3 TABLET | Refills: 0
Start: 2021-01-01 | End: 2021-01-01

## 2021-01-01 RX ORDER — POTASSIUM CHLORIDE 7.45 MG/ML
10 INJECTION INTRAVENOUS
Status: COMPLETED | OUTPATIENT
Start: 2021-01-01 | End: 2021-01-01

## 2021-01-01 RX ORDER — VASOPRESSIN 20 U/ML
INJECTION PARENTERAL PRN
Status: DISCONTINUED | OUTPATIENT
Start: 2021-01-01 | End: 2021-01-01 | Stop reason: SDUPTHER

## 2021-01-01 RX ORDER — ONDANSETRON 2 MG/ML
4 INJECTION INTRAMUSCULAR; INTRAVENOUS EVERY 6 HOURS PRN
Status: DISCONTINUED | OUTPATIENT
Start: 2021-01-01 | End: 2021-01-01

## 2021-01-01 RX ORDER — ONDANSETRON 2 MG/ML
4 INJECTION INTRAMUSCULAR; INTRAVENOUS EVERY 6 HOURS PRN
Status: DISCONTINUED | OUTPATIENT
Start: 2021-01-01 | End: 2021-01-01 | Stop reason: HOSPADM

## 2021-01-01 RX ORDER — TRAMADOL HYDROCHLORIDE 50 MG/1
50 TABLET ORAL EVERY 4 HOURS PRN
Status: DISCONTINUED | OUTPATIENT
Start: 2021-01-01 | End: 2021-01-01

## 2021-01-01 RX ORDER — DILTIAZEM HYDROCHLORIDE 120 MG/1
360 CAPSULE, EXTENDED RELEASE ORAL DAILY
Qty: 30 CAPSULE | Refills: 0 | Status: SHIPPED | OUTPATIENT
Start: 2021-01-01

## 2021-01-01 RX ORDER — TAMSULOSIN HYDROCHLORIDE 0.4 MG/1
0.4 CAPSULE ORAL NIGHTLY
Status: DISCONTINUED | OUTPATIENT
Start: 2021-01-01 | End: 2021-01-01 | Stop reason: HOSPADM

## 2021-01-01 RX ORDER — DILTIAZEM HYDROCHLORIDE 360 MG/1
360 CAPSULE, EXTENDED RELEASE ORAL DAILY
Status: DISCONTINUED | OUTPATIENT
Start: 2021-01-01 | End: 2021-01-01 | Stop reason: CLARIF

## 2021-01-01 RX ORDER — FLUTICASONE PROPIONATE 50 MCG
1 SPRAY, SUSPENSION (ML) NASAL DAILY
COMMUNITY

## 2021-01-01 RX ORDER — IBUPROFEN 400 MG/1
400 TABLET ORAL ONCE
Status: COMPLETED | OUTPATIENT
Start: 2021-01-01 | End: 2021-01-01

## 2021-01-01 RX ORDER — METRONIDAZOLE 250 MG/1
500 TABLET ORAL EVERY 8 HOURS SCHEDULED
Status: COMPLETED | OUTPATIENT
Start: 2021-01-01 | End: 2021-01-01

## 2021-01-01 RX ORDER — DEXTROSE, SODIUM CHLORIDE, SODIUM LACTATE, POTASSIUM CHLORIDE, AND CALCIUM CHLORIDE 5; .6; .31; .03; .02 G/100ML; G/100ML; G/100ML; G/100ML; G/100ML
INJECTION, SOLUTION INTRAVENOUS CONTINUOUS
Status: DISCONTINUED | OUTPATIENT
Start: 2021-01-01 | End: 2021-01-01

## 2021-01-01 RX ORDER — DEXTROSE MONOHYDRATE 50 MG/ML
100 INJECTION, SOLUTION INTRAVENOUS PRN
Status: DISCONTINUED | OUTPATIENT
Start: 2021-01-01 | End: 2021-01-01 | Stop reason: HOSPADM

## 2021-01-01 RX ORDER — SODIUM CHLORIDE 9 MG/ML
INJECTION, SOLUTION INTRAVENOUS CONTINUOUS PRN
Status: DISCONTINUED | OUTPATIENT
Start: 2021-01-01 | End: 2021-01-01 | Stop reason: SDUPTHER

## 2021-01-01 RX ORDER — DILTIAZEM HYDROCHLORIDE 360 MG/1
360 CAPSULE, EXTENDED RELEASE ORAL DAILY
Status: ON HOLD | COMMUNITY
End: 2021-01-01 | Stop reason: SDUPTHER

## 2021-01-01 RX ORDER — TAMSULOSIN HYDROCHLORIDE 0.4 MG/1
0.4 CAPSULE ORAL NIGHTLY
COMMUNITY

## 2021-01-01 RX ORDER — CIPROFLOXACIN 2 MG/ML
400 INJECTION, SOLUTION INTRAVENOUS EVERY 12 HOURS
Status: DISCONTINUED | OUTPATIENT
Start: 2021-01-01 | End: 2021-01-01

## 2021-01-01 RX ORDER — SODIUM CHLORIDE 9 MG/ML
25 INJECTION, SOLUTION INTRAVENOUS PRN
Status: DISCONTINUED | OUTPATIENT
Start: 2021-01-01 | End: 2021-01-01 | Stop reason: HOSPADM

## 2021-01-01 RX ORDER — ACETAMINOPHEN 325 MG/1
650 TABLET ORAL EVERY 6 HOURS PRN
Status: DISCONTINUED | OUTPATIENT
Start: 2021-01-01 | End: 2021-01-01 | Stop reason: HOSPADM

## 2021-01-01 RX ORDER — DEXTROSE MONOHYDRATE 25 G/50ML
25 INJECTION, SOLUTION INTRAVENOUS ONCE
Status: COMPLETED | OUTPATIENT
Start: 2021-01-01 | End: 2021-01-01

## 2021-01-01 RX ORDER — HYDROMORPHONE HCL 110MG/55ML
0.25 PATIENT CONTROLLED ANALGESIA SYRINGE INTRAVENOUS EVERY 5 MIN PRN
Status: DISCONTINUED | OUTPATIENT
Start: 2021-01-01 | End: 2021-01-01 | Stop reason: HOSPADM

## 2021-01-01 RX ORDER — OXYCODONE HYDROCHLORIDE 5 MG/1
5 TABLET ORAL EVERY 4 HOURS PRN
Status: DISCONTINUED | OUTPATIENT
Start: 2021-01-01 | End: 2021-01-01

## 2021-01-01 RX ORDER — PANTOPRAZOLE SODIUM 40 MG/10ML
40 INJECTION, POWDER, LYOPHILIZED, FOR SOLUTION INTRAVENOUS 2 TIMES DAILY
Status: DISCONTINUED | OUTPATIENT
Start: 2021-01-01 | End: 2021-01-01

## 2021-01-01 RX ORDER — CIPROFLOXACIN 500 MG/1
500 TABLET, FILM COATED ORAL EVERY 12 HOURS SCHEDULED
Qty: 3 TABLET | Refills: 0
Start: 2021-01-01 | End: 2021-01-01

## 2021-01-01 RX ORDER — FENTANYL CITRATE 50 UG/ML
25 INJECTION, SOLUTION INTRAMUSCULAR; INTRAVENOUS EVERY 5 MIN PRN
Status: DISCONTINUED | OUTPATIENT
Start: 2021-01-01 | End: 2021-01-01 | Stop reason: HOSPADM

## 2021-01-01 RX ORDER — HYDROMORPHONE HCL 110MG/55ML
0.5 PATIENT CONTROLLED ANALGESIA SYRINGE INTRAVENOUS EVERY 5 MIN PRN
Status: DISCONTINUED | OUTPATIENT
Start: 2021-01-01 | End: 2021-01-01 | Stop reason: HOSPADM

## 2021-01-01 RX ORDER — SODIUM CHLORIDE 0.9 % (FLUSH) 0.9 %
5-40 SYRINGE (ML) INJECTION EVERY 12 HOURS SCHEDULED
Status: DISCONTINUED | OUTPATIENT
Start: 2021-01-01 | End: 2021-01-01

## 2021-01-01 RX ORDER — SODIUM CHLORIDE 9 MG/ML
INJECTION, SOLUTION INTRAVENOUS CONTINUOUS
Status: ACTIVE | OUTPATIENT
Start: 2021-01-01 | End: 2021-01-01

## 2021-01-01 RX ORDER — TAMSULOSIN HYDROCHLORIDE 0.4 MG/1
0.4 CAPSULE ORAL NIGHTLY
Status: DISCONTINUED | OUTPATIENT
Start: 2021-01-01 | End: 2021-01-01

## 2021-01-01 RX ORDER — OXYBUTYNIN CHLORIDE 5 MG/1
5 TABLET, EXTENDED RELEASE ORAL DAILY
Status: DISCONTINUED | OUTPATIENT
Start: 2021-01-01 | End: 2021-01-01

## 2021-01-01 RX ORDER — SODIUM CHLORIDE 9 MG/ML
INJECTION, SOLUTION INTRAVENOUS CONTINUOUS
Status: DISCONTINUED | OUTPATIENT
Start: 2021-01-01 | End: 2021-01-01 | Stop reason: ALTCHOICE

## 2021-01-01 RX ORDER — SODIUM CHLORIDE 0.9 % (FLUSH) 0.9 %
5-40 SYRINGE (ML) INJECTION EVERY 12 HOURS SCHEDULED
Status: DISCONTINUED | OUTPATIENT
Start: 2021-01-01 | End: 2021-01-01 | Stop reason: HOSPADM

## 2021-01-01 RX ORDER — POLYETHYLENE GLYCOL 3350 17 G/17G
17 POWDER, FOR SOLUTION ORAL DAILY PRN
Status: DISCONTINUED | OUTPATIENT
Start: 2021-01-01 | End: 2021-01-01 | Stop reason: HOSPADM

## 2021-01-01 RX ORDER — ACETAMINOPHEN 650 MG/1
650 SUPPOSITORY RECTAL EVERY 6 HOURS PRN
Status: DISCONTINUED | OUTPATIENT
Start: 2021-01-01 | End: 2021-01-01 | Stop reason: HOSPADM

## 2021-01-01 RX ORDER — PROPOFOL 10 MG/ML
INJECTION, EMULSION INTRAVENOUS PRN
Status: DISCONTINUED | OUTPATIENT
Start: 2021-01-01 | End: 2021-01-01 | Stop reason: SDUPTHER

## 2021-01-01 RX ORDER — SODIUM CHLORIDE 0.9 % (FLUSH) 0.9 %
5-40 SYRINGE (ML) INJECTION PRN
Status: DISCONTINUED | OUTPATIENT
Start: 2021-01-01 | End: 2021-01-01 | Stop reason: HOSPADM

## 2021-01-01 RX ORDER — PROMETHAZINE HYDROCHLORIDE 25 MG/ML
6.25 INJECTION, SOLUTION INTRAMUSCULAR; INTRAVENOUS
Status: DISCONTINUED | OUTPATIENT
Start: 2021-01-01 | End: 2021-01-01 | Stop reason: HOSPADM

## 2021-01-01 RX ORDER — ONDANSETRON 4 MG/1
4 TABLET, ORALLY DISINTEGRATING ORAL EVERY 8 HOURS PRN
Status: DISCONTINUED | OUTPATIENT
Start: 2021-01-01 | End: 2021-01-01 | Stop reason: HOSPADM

## 2021-01-01 RX ORDER — SODIUM CHLORIDE 0.9 % (FLUSH) 0.9 %
10 SYRINGE (ML) INJECTION EVERY 12 HOURS SCHEDULED
Status: DISCONTINUED | OUTPATIENT
Start: 2021-01-01 | End: 2021-01-01 | Stop reason: HOSPADM

## 2021-01-01 RX ORDER — ALBUMIN, HUMAN INJ 5% 5 %
12.5 SOLUTION INTRAVENOUS ONCE
Status: COMPLETED | OUTPATIENT
Start: 2021-01-01 | End: 2021-01-01

## 2021-01-01 RX ORDER — ROSUVASTATIN CALCIUM 10 MG/1
10 TABLET, COATED ORAL NIGHTLY
Status: DISCONTINUED | OUTPATIENT
Start: 2021-01-01 | End: 2021-01-01 | Stop reason: HOSPADM

## 2021-01-01 RX ORDER — EPHEDRINE SULFATE/0.9% NACL/PF 50 MG/5 ML
SYRINGE (ML) INTRAVENOUS PRN
Status: DISCONTINUED | OUTPATIENT
Start: 2021-01-01 | End: 2021-01-01 | Stop reason: SDUPTHER

## 2021-01-01 RX ORDER — HYDROCODONE BITARTRATE AND ACETAMINOPHEN 5; 325 MG/1; MG/1
1 TABLET ORAL
Status: DISCONTINUED | OUTPATIENT
Start: 2021-01-01 | End: 2021-01-01 | Stop reason: HOSPADM

## 2021-01-01 RX ORDER — DILTIAZEM HYDROCHLORIDE 180 MG/1
360 CAPSULE, COATED, EXTENDED RELEASE ORAL DAILY
Status: DISCONTINUED | OUTPATIENT
Start: 2021-01-01 | End: 2021-01-01 | Stop reason: HOSPADM

## 2021-01-01 RX ORDER — DILTIAZEM HYDROCHLORIDE 180 MG/1
360 CAPSULE, COATED, EXTENDED RELEASE ORAL DAILY
Status: DISCONTINUED | OUTPATIENT
Start: 2021-01-01 | End: 2021-01-01

## 2021-01-01 RX ORDER — PROMETHAZINE HYDROCHLORIDE 25 MG/1
12.5 TABLET ORAL EVERY 6 HOURS PRN
Status: DISCONTINUED | OUTPATIENT
Start: 2021-01-01 | End: 2021-01-01 | Stop reason: HOSPADM

## 2021-01-01 RX ORDER — METRONIDAZOLE 250 MG/1
500 TABLET ORAL EVERY 8 HOURS SCHEDULED
Status: DISCONTINUED | OUTPATIENT
Start: 2021-01-01 | End: 2021-01-01

## 2021-01-01 RX ORDER — METOCLOPRAMIDE HYDROCHLORIDE 5 MG/ML
5 INJECTION INTRAMUSCULAR; INTRAVENOUS EVERY 6 HOURS
Status: DISCONTINUED | OUTPATIENT
Start: 2021-01-01 | End: 2021-01-01

## 2021-01-01 RX ORDER — CIPROFLOXACIN 500 MG/1
500 TABLET, FILM COATED ORAL EVERY 12 HOURS SCHEDULED
Status: DISCONTINUED | OUTPATIENT
Start: 2021-01-01 | End: 2021-01-01

## 2021-01-01 RX ORDER — SODIUM CHLORIDE 9 MG/ML
25 INJECTION, SOLUTION INTRAVENOUS PRN
Status: DISCONTINUED | OUTPATIENT
Start: 2021-01-01 | End: 2021-01-01

## 2021-01-01 RX ORDER — SODIUM CHLORIDE 0.9 % (FLUSH) 0.9 %
10 SYRINGE (ML) INJECTION PRN
Status: DISCONTINUED | OUTPATIENT
Start: 2021-01-01 | End: 2021-01-01 | Stop reason: HOSPADM

## 2021-01-01 RX ORDER — TROSPIUM CHLORIDE 20 MG/1
20 TABLET, FILM COATED ORAL 2 TIMES DAILY
Status: DISCONTINUED | OUTPATIENT
Start: 2021-01-01 | End: 2021-01-01

## 2021-01-01 RX ORDER — MAGNESIUM SULFATE IN WATER 40 MG/ML
4000 INJECTION, SOLUTION INTRAVENOUS ONCE
Status: COMPLETED | OUTPATIENT
Start: 2021-01-01 | End: 2021-01-01

## 2021-01-01 RX ORDER — OXYCODONE HYDROCHLORIDE 5 MG/1
10 TABLET ORAL EVERY 4 HOURS PRN
Status: DISCONTINUED | OUTPATIENT
Start: 2021-01-01 | End: 2021-01-01

## 2021-01-01 RX ORDER — DEXTROSE MONOHYDRATE 25 G/50ML
12.5 INJECTION, SOLUTION INTRAVENOUS PRN
Status: DISCONTINUED | OUTPATIENT
Start: 2021-01-01 | End: 2021-01-01 | Stop reason: HOSPADM

## 2021-01-01 RX ORDER — ROSUVASTATIN CALCIUM 20 MG/1
10 TABLET, COATED ORAL DAILY
COMMUNITY

## 2021-01-01 RX ORDER — ONDANSETRON 2 MG/ML
4 INJECTION INTRAMUSCULAR; INTRAVENOUS
Status: DISCONTINUED | OUTPATIENT
Start: 2021-01-01 | End: 2021-01-01 | Stop reason: HOSPADM

## 2021-01-01 RX ORDER — LIDOCAINE HYDROCHLORIDE 20 MG/ML
INJECTION, SOLUTION EPIDURAL; INFILTRATION; INTRACAUDAL; PERINEURAL PRN
Status: DISCONTINUED | OUTPATIENT
Start: 2021-01-01 | End: 2021-01-01 | Stop reason: SDUPTHER

## 2021-01-01 RX ORDER — SODIUM CHLORIDE 0.9 % (FLUSH) 0.9 %
5-40 SYRINGE (ML) INJECTION PRN
Status: DISCONTINUED | OUTPATIENT
Start: 2021-01-01 | End: 2021-01-01

## 2021-01-01 RX ORDER — PANTOPRAZOLE SODIUM 40 MG/1
40 TABLET, DELAYED RELEASE ORAL
Qty: 30 TABLET | Refills: 0
Start: 2021-01-01

## 2021-01-01 RX ORDER — SODIUM CHLORIDE 9 MG/ML
INJECTION, SOLUTION INTRAVENOUS ONCE
Status: COMPLETED | OUTPATIENT
Start: 2021-01-01 | End: 2021-01-01

## 2021-01-01 RX ORDER — PANTOPRAZOLE SODIUM 40 MG/1
40 TABLET, DELAYED RELEASE ORAL
Status: DISCONTINUED | OUTPATIENT
Start: 2021-01-01 | End: 2021-01-01 | Stop reason: HOSPADM

## 2021-01-01 RX ORDER — LIDOCAINE HYDROCHLORIDE 10 MG/ML
1 INJECTION, SOLUTION EPIDURAL; INFILTRATION; INTRACAUDAL; PERINEURAL
Status: ACTIVE | OUTPATIENT
Start: 2021-01-01 | End: 2021-01-01

## 2021-01-01 RX ORDER — NICOTINE POLACRILEX 4 MG
15 LOZENGE BUCCAL PRN
Status: DISCONTINUED | OUTPATIENT
Start: 2021-01-01 | End: 2021-01-01 | Stop reason: HOSPADM

## 2021-01-01 RX ADMIN — TROSPIUM CHLORIDE 20 MG: 20 TABLET, FILM COATED ORAL at 08:25

## 2021-01-01 RX ADMIN — CIPROFLOXACIN 500 MG: 500 TABLET, FILM COATED ORAL at 21:07

## 2021-01-01 RX ADMIN — TAMSULOSIN HYDROCHLORIDE 0.4 MG: 0.4 CAPSULE ORAL at 23:12

## 2021-01-01 RX ADMIN — APIXABAN 5 MG: 5 TABLET, FILM COATED ORAL at 21:30

## 2021-01-01 RX ADMIN — SODIUM CHLORIDE 25 ML: 9 INJECTION, SOLUTION INTRAVENOUS at 21:19

## 2021-01-01 RX ADMIN — VASOPRESSIN 2 UNITS: 20 INJECTION INTRAVENOUS at 11:28

## 2021-01-01 RX ADMIN — VASOPRESSIN 2 UNITS: 20 INJECTION INTRAVENOUS at 11:37

## 2021-01-01 RX ADMIN — ROSUVASTATIN 10 MG: 10 TABLET, FILM COATED ORAL at 21:45

## 2021-01-01 RX ADMIN — LIDOCAINE HYDROCHLORIDE 100 MG: 20 INJECTION, SOLUTION EPIDURAL; INFILTRATION; INTRACAUDAL; PERINEURAL at 11:17

## 2021-01-01 RX ADMIN — APIXABAN 5 MG: 5 TABLET, FILM COATED ORAL at 08:57

## 2021-01-01 RX ADMIN — PANTOPRAZOLE SODIUM 40 MG: 40 TABLET, DELAYED RELEASE ORAL at 04:37

## 2021-01-01 RX ADMIN — APIXABAN 5 MG: 5 TABLET, FILM COATED ORAL at 20:22

## 2021-01-01 RX ADMIN — SODIUM CHLORIDE, SODIUM LACTATE, POTASSIUM CHLORIDE, CALCIUM CHLORIDE AND DEXTROSE MONOHYDRATE: 5; 600; 310; 30; 20 INJECTION, SOLUTION INTRAVENOUS at 07:53

## 2021-01-01 RX ADMIN — METRONIDAZOLE 500 MG: 500 INJECTION, SOLUTION INTRAVENOUS at 05:37

## 2021-01-01 RX ADMIN — APIXABAN 5 MG: 5 TABLET, FILM COATED ORAL at 20:48

## 2021-01-01 RX ADMIN — METRONIDAZOLE 500 MG: 250 TABLET ORAL at 20:39

## 2021-01-01 RX ADMIN — PANTOPRAZOLE SODIUM 40 MG: 40 TABLET, DELAYED RELEASE ORAL at 03:56

## 2021-01-01 RX ADMIN — METRONIDAZOLE 500 MG: 250 TABLET ORAL at 20:51

## 2021-01-01 RX ADMIN — METRONIDAZOLE 500 MG: 500 INJECTION, SOLUTION INTRAVENOUS at 02:32

## 2021-01-01 RX ADMIN — VANCOMYCIN HYDROCHLORIDE 1000 MG: 1 INJECTION, POWDER, LYOPHILIZED, FOR SOLUTION INTRAVENOUS at 12:35

## 2021-01-01 RX ADMIN — SODIUM CHLORIDE: 9 INJECTION, SOLUTION INTRAVENOUS at 03:57

## 2021-01-01 RX ADMIN — ROSUVASTATIN 10 MG: 10 TABLET, FILM COATED ORAL at 21:40

## 2021-01-01 RX ADMIN — METRONIDAZOLE 500 MG: 500 INJECTION, SOLUTION INTRAVENOUS at 06:44

## 2021-01-01 RX ADMIN — ONDANSETRON 4 MG: 2 INJECTION INTRAMUSCULAR; INTRAVENOUS at 08:09

## 2021-01-01 RX ADMIN — METRONIDAZOLE 500 MG: 250 TABLET ORAL at 06:03

## 2021-01-01 RX ADMIN — PANTOPRAZOLE SODIUM 40 MG: 40 TABLET, DELAYED RELEASE ORAL at 17:26

## 2021-01-01 RX ADMIN — VANCOMYCIN HYDROCHLORIDE 1000 MG: 1 INJECTION, POWDER, LYOPHILIZED, FOR SOLUTION INTRAVENOUS at 22:05

## 2021-01-01 RX ADMIN — IBUPROFEN 400 MG: 400 TABLET, FILM COATED ORAL at 01:34

## 2021-01-01 RX ADMIN — METOCLOPRAMIDE 5 MG: 5 INJECTION, SOLUTION INTRAMUSCULAR; INTRAVENOUS at 01:54

## 2021-01-01 RX ADMIN — AMINOPHYLLINE 100 MG: 25 INJECTION, SOLUTION INTRAVENOUS at 10:56

## 2021-01-01 RX ADMIN — METOCLOPRAMIDE 5 MG: 5 INJECTION, SOLUTION INTRAMUSCULAR; INTRAVENOUS at 21:14

## 2021-01-01 RX ADMIN — DILTIAZEM HYDROCHLORIDE 360 MG: 180 CAPSULE, COATED, EXTENDED RELEASE ORAL at 10:01

## 2021-01-01 RX ADMIN — SODIUM CHLORIDE: 9 INJECTION, SOLUTION INTRAVENOUS at 00:32

## 2021-01-01 RX ADMIN — SODIUM CHLORIDE 25 ML: 9 INJECTION, SOLUTION INTRAVENOUS at 10:03

## 2021-01-01 RX ADMIN — ROSUVASTATIN 10 MG: 10 TABLET, FILM COATED ORAL at 20:51

## 2021-01-01 RX ADMIN — PANTOPRAZOLE SODIUM 40 MG: 40 INJECTION, POWDER, FOR SOLUTION INTRAVENOUS at 08:49

## 2021-01-01 RX ADMIN — PHENYLEPHRINE HYDROCHLORIDE 100 MCG: 10 INJECTION INTRAVENOUS at 11:22

## 2021-01-01 RX ADMIN — SODIUM CHLORIDE: 9 INJECTION, SOLUTION INTRAVENOUS at 11:13

## 2021-01-01 RX ADMIN — CIPROFLOXACIN 400 MG: 2 INJECTION, SOLUTION INTRAVENOUS at 09:28

## 2021-01-01 RX ADMIN — TETROFOSMIN 30 MILLICURIE: 1.38 INJECTION, POWDER, LYOPHILIZED, FOR SOLUTION INTRAVENOUS at 10:51

## 2021-01-01 RX ADMIN — APIXABAN 5 MG: 5 TABLET, FILM COATED ORAL at 10:02

## 2021-01-01 RX ADMIN — Medication 10 ML: at 21:27

## 2021-01-01 RX ADMIN — CIPROFLOXACIN 400 MG: 2 INJECTION, SOLUTION INTRAVENOUS at 22:25

## 2021-01-01 RX ADMIN — ACETAMINOPHEN 650 MG: 325 TABLET ORAL at 09:40

## 2021-01-01 RX ADMIN — SODIUM CHLORIDE: 9 INJECTION, SOLUTION INTRAVENOUS at 06:31

## 2021-01-01 RX ADMIN — CIPROFLOXACIN 500 MG: 500 TABLET, FILM COATED ORAL at 08:50

## 2021-01-01 RX ADMIN — APIXABAN 5 MG: 5 TABLET, FILM COATED ORAL at 08:49

## 2021-01-01 RX ADMIN — METRONIDAZOLE 500 MG: 250 TABLET ORAL at 14:15

## 2021-01-01 RX ADMIN — SODIUM CHLORIDE: 9 INJECTION, SOLUTION INTRAVENOUS at 20:22

## 2021-01-01 RX ADMIN — TROSPIUM CHLORIDE 20 MG: 20 TABLET, FILM COATED ORAL at 20:36

## 2021-01-01 RX ADMIN — ONDANSETRON 4 MG: 2 INJECTION INTRAMUSCULAR; INTRAVENOUS at 02:09

## 2021-01-01 RX ADMIN — METOCLOPRAMIDE 5 MG: 5 INJECTION, SOLUTION INTRAMUSCULAR; INTRAVENOUS at 00:34

## 2021-01-01 RX ADMIN — SODIUM CHLORIDE: 9 INJECTION, SOLUTION INTRAVENOUS at 07:34

## 2021-01-01 RX ADMIN — METRONIDAZOLE 500 MG: 250 TABLET ORAL at 05:28

## 2021-01-01 RX ADMIN — APIXABAN 5 MG: 5 TABLET, FILM COATED ORAL at 20:50

## 2021-01-01 RX ADMIN — VASOPRESSIN 2 UNITS: 20 INJECTION INTRAVENOUS at 11:42

## 2021-01-01 RX ADMIN — TAMSULOSIN HYDROCHLORIDE 0.4 MG: 0.4 CAPSULE ORAL at 20:36

## 2021-01-01 RX ADMIN — METOCLOPRAMIDE 5 MG: 5 INJECTION, SOLUTION INTRAMUSCULAR; INTRAVENOUS at 14:59

## 2021-01-01 RX ADMIN — TROSPIUM CHLORIDE 20 MG: 20 TABLET, FILM COATED ORAL at 09:41

## 2021-01-01 RX ADMIN — TROSPIUM CHLORIDE 20 MG: 20 TABLET, FILM COATED ORAL at 22:21

## 2021-01-01 RX ADMIN — METRONIDAZOLE 500 MG: 500 INJECTION, SOLUTION INTRAVENOUS at 14:34

## 2021-01-01 RX ADMIN — TROSPIUM CHLORIDE 20 MG: 20 TABLET, FILM COATED ORAL at 21:14

## 2021-01-01 RX ADMIN — APIXABAN 5 MG: 5 TABLET, FILM COATED ORAL at 22:22

## 2021-01-01 RX ADMIN — TAMSULOSIN HYDROCHLORIDE 0.4 MG: 0.4 CAPSULE ORAL at 21:27

## 2021-01-01 RX ADMIN — SODIUM CHLORIDE: 9 INJECTION, SOLUTION INTRAVENOUS at 20:47

## 2021-01-01 RX ADMIN — METOCLOPRAMIDE 5 MG: 5 INJECTION, SOLUTION INTRAMUSCULAR; INTRAVENOUS at 09:24

## 2021-01-01 RX ADMIN — SODIUM CHLORIDE: 9 INJECTION, SOLUTION INTRAVENOUS at 11:58

## 2021-01-01 RX ADMIN — Medication 240 ML: at 12:54

## 2021-01-01 RX ADMIN — PANTOPRAZOLE SODIUM 40 MG: 40 INJECTION, POWDER, FOR SOLUTION INTRAVENOUS at 08:56

## 2021-01-01 RX ADMIN — TROSPIUM CHLORIDE 20 MG: 20 TABLET, FILM COATED ORAL at 20:52

## 2021-01-01 RX ADMIN — Medication 10 ML: at 14:10

## 2021-01-01 RX ADMIN — ALBUMIN (HUMAN) 12.5 G: 12.5 INJECTION, SOLUTION INTRAVENOUS at 11:50

## 2021-01-01 RX ADMIN — Medication 10 ML: at 20:22

## 2021-01-01 RX ADMIN — CIPROFLOXACIN 500 MG: 500 TABLET, FILM COATED ORAL at 08:26

## 2021-01-01 RX ADMIN — TROSPIUM CHLORIDE 20 MG: 20 TABLET, FILM COATED ORAL at 20:31

## 2021-01-01 RX ADMIN — METOCLOPRAMIDE 5 MG: 5 INJECTION, SOLUTION INTRAMUSCULAR; INTRAVENOUS at 14:15

## 2021-01-01 RX ADMIN — PROPOFOL 70 MG: 10 INJECTION, EMULSION INTRAVENOUS at 11:17

## 2021-01-01 RX ADMIN — METRONIDAZOLE 500 MG: 500 INJECTION, SOLUTION INTRAVENOUS at 01:13

## 2021-01-01 RX ADMIN — CIPROFLOXACIN 400 MG: 2 INJECTION, SOLUTION INTRAVENOUS at 00:08

## 2021-01-01 RX ADMIN — Medication 10 ML: at 21:22

## 2021-01-01 RX ADMIN — TROSPIUM CHLORIDE 20 MG: 20 TABLET, FILM COATED ORAL at 08:11

## 2021-01-01 RX ADMIN — METRONIDAZOLE 500 MG: 500 INJECTION, SOLUTION INTRAVENOUS at 22:36

## 2021-01-01 RX ADMIN — APIXABAN 5 MG: 5 TABLET, FILM COATED ORAL at 21:27

## 2021-01-01 RX ADMIN — SODIUM CHLORIDE, SODIUM LACTATE, POTASSIUM CHLORIDE, CALCIUM CHLORIDE AND DEXTROSE MONOHYDRATE: 5; 600; 310; 30; 20 INJECTION, SOLUTION INTRAVENOUS at 18:20

## 2021-01-01 RX ADMIN — TRAMADOL HYDROCHLORIDE 50 MG: 50 TABLET, FILM COATED ORAL at 23:12

## 2021-01-01 RX ADMIN — CIPROFLOXACIN 400 MG: 2 INJECTION, SOLUTION INTRAVENOUS at 07:53

## 2021-01-01 RX ADMIN — TRAMADOL HYDROCHLORIDE 50 MG: 50 TABLET, FILM COATED ORAL at 16:55

## 2021-01-01 RX ADMIN — VANCOMYCIN HYDROCHLORIDE 1000 MG: 1 INJECTION, POWDER, LYOPHILIZED, FOR SOLUTION INTRAVENOUS at 23:52

## 2021-01-01 RX ADMIN — ROSUVASTATIN 10 MG: 10 TABLET, FILM COATED ORAL at 22:21

## 2021-01-01 RX ADMIN — POTASSIUM CHLORIDE 10 MEQ: 7.46 INJECTION, SOLUTION INTRAVENOUS at 10:50

## 2021-01-01 RX ADMIN — PANTOPRAZOLE SODIUM 40 MG: 40 INJECTION, POWDER, FOR SOLUTION INTRAVENOUS at 21:15

## 2021-01-01 RX ADMIN — VANCOMYCIN HYDROCHLORIDE 1000 MG: 1 INJECTION, POWDER, LYOPHILIZED, FOR SOLUTION INTRAVENOUS at 21:11

## 2021-01-01 RX ADMIN — PANTOPRAZOLE SODIUM 40 MG: 40 TABLET, DELAYED RELEASE ORAL at 18:16

## 2021-01-01 RX ADMIN — Medication 10 ML: at 08:49

## 2021-01-01 RX ADMIN — Medication 240 ML: at 22:29

## 2021-01-01 RX ADMIN — CIPROFLOXACIN 400 MG: 2 INJECTION, SOLUTION INTRAVENOUS at 22:22

## 2021-01-01 RX ADMIN — ACETAMINOPHEN 650 MG: 325 TABLET ORAL at 20:48

## 2021-01-01 RX ADMIN — Medication 10 MG: at 11:36

## 2021-01-01 RX ADMIN — TAMSULOSIN HYDROCHLORIDE 0.4 MG: 0.4 CAPSULE ORAL at 21:45

## 2021-01-01 RX ADMIN — ROSUVASTATIN 10 MG: 10 TABLET, FILM COATED ORAL at 21:14

## 2021-01-01 RX ADMIN — CIPROFLOXACIN 400 MG: 2 INJECTION, SOLUTION INTRAVENOUS at 07:54

## 2021-01-01 RX ADMIN — TROSPIUM CHLORIDE 20 MG: 20 TABLET, FILM COATED ORAL at 08:57

## 2021-01-01 RX ADMIN — SODIUM CHLORIDE, SODIUM LACTATE, POTASSIUM CHLORIDE, CALCIUM CHLORIDE AND DEXTROSE MONOHYDRATE: 5; 600; 310; 30; 20 INJECTION, SOLUTION INTRAVENOUS at 05:30

## 2021-01-01 RX ADMIN — CIPROFLOXACIN 400 MG: 2 INJECTION, SOLUTION INTRAVENOUS at 20:37

## 2021-01-01 RX ADMIN — CIPROFLOXACIN 500 MG: 500 TABLET, FILM COATED ORAL at 12:03

## 2021-01-01 RX ADMIN — SODIUM CHLORIDE 25 ML: 9 INJECTION, SOLUTION INTRAVENOUS at 14:34

## 2021-01-01 RX ADMIN — DILTIAZEM HYDROCHLORIDE 360 MG: 180 CAPSULE, COATED, EXTENDED RELEASE ORAL at 13:14

## 2021-01-01 RX ADMIN — PANTOPRAZOLE SODIUM 40 MG: 40 INJECTION, POWDER, FOR SOLUTION INTRAVENOUS at 00:31

## 2021-01-01 RX ADMIN — PROPOFOL 100 MCG/KG/MIN: 10 INJECTION, EMULSION INTRAVENOUS at 11:17

## 2021-01-01 RX ADMIN — METRONIDAZOLE 500 MG: 250 TABLET ORAL at 18:36

## 2021-01-01 RX ADMIN — Medication 10 ML: at 20:23

## 2021-01-01 RX ADMIN — TROSPIUM CHLORIDE 20 MG: 20 TABLET, FILM COATED ORAL at 20:50

## 2021-01-01 RX ADMIN — TROSPIUM CHLORIDE 20 MG: 20 TABLET, FILM COATED ORAL at 08:06

## 2021-01-01 RX ADMIN — Medication 10 ML: at 09:43

## 2021-01-01 RX ADMIN — TROSPIUM CHLORIDE 20 MG: 20 TABLET, FILM COATED ORAL at 20:40

## 2021-01-01 RX ADMIN — TROSPIUM CHLORIDE 20 MG: 20 TABLET, FILM COATED ORAL at 23:12

## 2021-01-01 RX ADMIN — TRAMADOL HYDROCHLORIDE 50 MG: 50 TABLET, FILM COATED ORAL at 12:26

## 2021-01-01 RX ADMIN — METOCLOPRAMIDE 5 MG: 5 INJECTION, SOLUTION INTRAMUSCULAR; INTRAVENOUS at 01:40

## 2021-01-01 RX ADMIN — ROSUVASTATIN 10 MG: 10 TABLET, FILM COATED ORAL at 20:40

## 2021-01-01 RX ADMIN — ACETAMINOPHEN 650 MG: 325 TABLET ORAL at 00:45

## 2021-01-01 RX ADMIN — TRAMADOL HYDROCHLORIDE 50 MG: 50 TABLET, FILM COATED ORAL at 08:09

## 2021-01-01 RX ADMIN — CIPROFLOXACIN 400 MG: 2 INJECTION, SOLUTION INTRAVENOUS at 09:07

## 2021-01-01 RX ADMIN — TROSPIUM CHLORIDE 20 MG: 20 TABLET, FILM COATED ORAL at 21:06

## 2021-01-01 RX ADMIN — METRONIDAZOLE 500 MG: 500 INJECTION, SOLUTION INTRAVENOUS at 06:12

## 2021-01-01 RX ADMIN — METOCLOPRAMIDE 5 MG: 5 INJECTION, SOLUTION INTRAMUSCULAR; INTRAVENOUS at 14:17

## 2021-01-01 RX ADMIN — ACETAMINOPHEN 650 MG: 325 TABLET ORAL at 10:06

## 2021-01-01 RX ADMIN — SODIUM CHLORIDE, SODIUM LACTATE, POTASSIUM CHLORIDE, CALCIUM CHLORIDE AND DEXTROSE MONOHYDRATE: 5; 600; 310; 30; 20 INJECTION, SOLUTION INTRAVENOUS at 05:43

## 2021-01-01 RX ADMIN — SODIUM CHLORIDE 25 ML: 9 INJECTION, SOLUTION INTRAVENOUS at 14:20

## 2021-01-01 RX ADMIN — VANCOMYCIN HYDROCHLORIDE 1250 MG: 10 INJECTION, POWDER, LYOPHILIZED, FOR SOLUTION INTRAVENOUS at 18:48

## 2021-01-01 RX ADMIN — METOCLOPRAMIDE 5 MG: 5 INJECTION, SOLUTION INTRAMUSCULAR; INTRAVENOUS at 01:27

## 2021-01-01 RX ADMIN — POLYETHYLENE GLYCOL 3350 17 G: 17 POWDER, FOR SOLUTION ORAL at 08:50

## 2021-01-01 RX ADMIN — METRONIDAZOLE 500 MG: 500 INJECTION, SOLUTION INTRAVENOUS at 05:34

## 2021-01-01 RX ADMIN — DEXTROSE MONOHYDRATE 25 G: 25 INJECTION, SOLUTION INTRAVENOUS at 10:43

## 2021-01-01 RX ADMIN — SODIUM CHLORIDE, SODIUM LACTATE, POTASSIUM CHLORIDE, CALCIUM CHLORIDE AND DEXTROSE MONOHYDRATE: 5; 600; 310; 30; 20 INJECTION, SOLUTION INTRAVENOUS at 14:33

## 2021-01-01 RX ADMIN — CIPROFLOXACIN 400 MG: 2 INJECTION, SOLUTION INTRAVENOUS at 11:46

## 2021-01-01 RX ADMIN — APIXABAN 5 MG: 5 TABLET, FILM COATED ORAL at 08:05

## 2021-01-01 RX ADMIN — ROSUVASTATIN 10 MG: 10 TABLET, FILM COATED ORAL at 20:50

## 2021-01-01 RX ADMIN — ACETAMINOPHEN 650 MG: 325 TABLET ORAL at 08:11

## 2021-01-01 RX ADMIN — PHENYLEPHRINE HYDROCHLORIDE 200 MCG: 10 INJECTION INTRAVENOUS at 11:24

## 2021-01-01 RX ADMIN — TROSPIUM CHLORIDE 20 MG: 20 TABLET, FILM COATED ORAL at 21:40

## 2021-01-01 RX ADMIN — PANTOPRAZOLE SODIUM 40 MG: 40 INJECTION, POWDER, FOR SOLUTION INTRAVENOUS at 07:54

## 2021-01-01 RX ADMIN — ROSUVASTATIN 10 MG: 10 TABLET, FILM COATED ORAL at 21:07

## 2021-01-01 RX ADMIN — IOPAMIDOL 75 ML: 755 INJECTION, SOLUTION INTRAVENOUS at 19:22

## 2021-01-01 RX ADMIN — METRONIDAZOLE 500 MG: 250 TABLET ORAL at 12:03

## 2021-01-01 RX ADMIN — REGADENOSON 0.4 MG: 0.08 INJECTION, SOLUTION INTRAVENOUS at 10:55

## 2021-01-01 RX ADMIN — Medication 10 ML: at 08:14

## 2021-01-01 RX ADMIN — APIXABAN 5 MG: 5 TABLET, FILM COATED ORAL at 08:44

## 2021-01-01 RX ADMIN — Medication 10 MG: at 11:50

## 2021-01-01 RX ADMIN — VANCOMYCIN HYDROCHLORIDE 1000 MG: 1 INJECTION, POWDER, LYOPHILIZED, FOR SOLUTION INTRAVENOUS at 23:28

## 2021-01-01 RX ADMIN — METRONIDAZOLE 500 MG: 500 INJECTION, SOLUTION INTRAVENOUS at 01:50

## 2021-01-01 RX ADMIN — ACETAMINOPHEN 650 MG: 325 TABLET ORAL at 00:26

## 2021-01-01 RX ADMIN — IOHEXOL 50 ML: 240 INJECTION, SOLUTION INTRATHECAL; INTRAVASCULAR; INTRAVENOUS; ORAL at 15:04

## 2021-01-01 RX ADMIN — APIXABAN 5 MG: 5 TABLET, FILM COATED ORAL at 23:12

## 2021-01-01 RX ADMIN — DILTIAZEM HYDROCHLORIDE 360 MG: 180 CAPSULE, EXTENDED RELEASE ORAL at 09:13

## 2021-01-01 RX ADMIN — Medication 10 ML: at 22:00

## 2021-01-01 RX ADMIN — Medication 20 ML: at 09:14

## 2021-01-01 RX ADMIN — OXYBUTYNIN CHLORIDE 5 MG: 5 TABLET, EXTENDED RELEASE ORAL at 08:06

## 2021-01-01 RX ADMIN — Medication 10 MG: at 11:28

## 2021-01-01 RX ADMIN — Medication 240 ML: at 15:32

## 2021-01-01 RX ADMIN — Medication 10 MG: at 11:42

## 2021-01-01 RX ADMIN — APIXABAN 5 MG: 5 TABLET, FILM COATED ORAL at 09:10

## 2021-01-01 RX ADMIN — VANCOMYCIN HYDROCHLORIDE 1000 MG: 1 INJECTION, POWDER, LYOPHILIZED, FOR SOLUTION INTRAVENOUS at 21:20

## 2021-01-01 RX ADMIN — Medication 10 ML: at 22:22

## 2021-01-01 RX ADMIN — METRONIDAZOLE 500 MG: 500 INJECTION, SOLUTION INTRAVENOUS at 22:03

## 2021-01-01 RX ADMIN — ROSUVASTATIN 10 MG: 10 TABLET, FILM COATED ORAL at 20:36

## 2021-01-01 RX ADMIN — DILTIAZEM HYDROCHLORIDE 360 MG: 180 CAPSULE, EXTENDED RELEASE ORAL at 08:06

## 2021-01-01 RX ADMIN — METOCLOPRAMIDE 5 MG: 5 INJECTION, SOLUTION INTRAMUSCULAR; INTRAVENOUS at 20:37

## 2021-01-01 RX ADMIN — Medication 10 ML: at 21:40

## 2021-01-01 RX ADMIN — TROSPIUM CHLORIDE 20 MG: 20 TABLET, FILM COATED ORAL at 09:12

## 2021-01-01 RX ADMIN — CIPROFLOXACIN 400 MG: 2 INJECTION, SOLUTION INTRAVENOUS at 22:34

## 2021-01-01 RX ADMIN — ROSUVASTATIN 10 MG: 10 TABLET, FILM COATED ORAL at 23:12

## 2021-01-01 RX ADMIN — METRONIDAZOLE 500 MG: 500 INJECTION, SOLUTION INTRAVENOUS at 05:43

## 2021-01-01 RX ADMIN — ENOXAPARIN SODIUM 40 MG: 40 INJECTION SUBCUTANEOUS at 22:00

## 2021-01-01 RX ADMIN — ROSUVASTATIN 10 MG: 10 TABLET, FILM COATED ORAL at 20:48

## 2021-01-01 RX ADMIN — APIXABAN 5 MG: 5 TABLET, FILM COATED ORAL at 08:11

## 2021-01-01 RX ADMIN — ROSUVASTATIN 10 MG: 10 TABLET, FILM COATED ORAL at 20:31

## 2021-01-01 RX ADMIN — METOCLOPRAMIDE 5 MG: 5 INJECTION, SOLUTION INTRAMUSCULAR; INTRAVENOUS at 04:38

## 2021-01-01 RX ADMIN — TRAMADOL HYDROCHLORIDE 50 MG: 50 TABLET, FILM COATED ORAL at 20:35

## 2021-01-01 RX ADMIN — METRONIDAZOLE 500 MG: 250 TABLET ORAL at 15:03

## 2021-01-01 RX ADMIN — VANCOMYCIN HYDROCHLORIDE 1000 MG: 1 INJECTION, POWDER, LYOPHILIZED, FOR SOLUTION INTRAVENOUS at 00:39

## 2021-01-01 RX ADMIN — CIPROFLOXACIN 500 MG: 500 TABLET, FILM COATED ORAL at 20:52

## 2021-01-01 RX ADMIN — METRONIDAZOLE 500 MG: 500 INJECTION, SOLUTION INTRAVENOUS at 14:20

## 2021-01-01 RX ADMIN — SODIUM CHLORIDE: 9 INJECTION, SOLUTION INTRAVENOUS at 20:26

## 2021-01-01 RX ADMIN — Medication 10 MG: at 11:24

## 2021-01-01 RX ADMIN — ROSUVASTATIN 10 MG: 10 TABLET, FILM COATED ORAL at 22:00

## 2021-01-01 RX ADMIN — POLYETHYLENE GLYCOL 3350 17 G: 17 POWDER, FOR SOLUTION ORAL at 14:14

## 2021-01-01 RX ADMIN — CIPROFLOXACIN 500 MG: 500 TABLET, FILM COATED ORAL at 08:49

## 2021-01-01 RX ADMIN — CIPROFLOXACIN 500 MG: 500 TABLET, FILM COATED ORAL at 20:39

## 2021-01-01 RX ADMIN — METOCLOPRAMIDE 5 MG: 5 INJECTION, SOLUTION INTRAMUSCULAR; INTRAVENOUS at 07:54

## 2021-01-01 RX ADMIN — PANTOPRAZOLE SODIUM 40 MG: 40 INJECTION, POWDER, FOR SOLUTION INTRAVENOUS at 22:00

## 2021-01-01 RX ADMIN — METRONIDAZOLE 500 MG: 500 INJECTION, SOLUTION INTRAVENOUS at 14:35

## 2021-01-01 RX ADMIN — METOCLOPRAMIDE 5 MG: 5 INJECTION, SOLUTION INTRAMUSCULAR; INTRAVENOUS at 14:35

## 2021-01-01 RX ADMIN — Medication 10 ML: at 09:42

## 2021-01-01 RX ADMIN — POTASSIUM CHLORIDE 10 MEQ: 7.46 INJECTION, SOLUTION INTRAVENOUS at 09:44

## 2021-01-01 RX ADMIN — Medication 10 ML: at 22:32

## 2021-01-01 RX ADMIN — Medication 10 ML: at 21:19

## 2021-01-01 RX ADMIN — APIXABAN 5 MG: 5 TABLET, FILM COATED ORAL at 20:36

## 2021-01-01 RX ADMIN — OXYBUTYNIN CHLORIDE 5 MG: 5 TABLET, EXTENDED RELEASE ORAL at 09:37

## 2021-01-01 RX ADMIN — ACETAMINOPHEN 650 MG: 325 TABLET ORAL at 16:55

## 2021-01-01 RX ADMIN — Medication 10 ML: at 21:46

## 2021-01-01 RX ADMIN — METOCLOPRAMIDE 5 MG: 5 INJECTION, SOLUTION INTRAMUSCULAR; INTRAVENOUS at 08:56

## 2021-01-01 RX ADMIN — TROSPIUM CHLORIDE 20 MG: 20 TABLET, FILM COATED ORAL at 08:50

## 2021-01-01 RX ADMIN — Medication 240 ML: at 14:57

## 2021-01-01 RX ADMIN — TROSPIUM CHLORIDE 20 MG: 20 TABLET, FILM COATED ORAL at 08:44

## 2021-01-01 RX ADMIN — METOCLOPRAMIDE 5 MG: 5 INJECTION, SOLUTION INTRAMUSCULAR; INTRAVENOUS at 22:31

## 2021-01-01 RX ADMIN — TROSPIUM CHLORIDE 20 MG: 20 TABLET, FILM COATED ORAL at 08:49

## 2021-01-01 RX ADMIN — TAMSULOSIN HYDROCHLORIDE 0.4 MG: 0.4 CAPSULE ORAL at 20:48

## 2021-01-01 RX ADMIN — TROSPIUM CHLORIDE 20 MG: 20 TABLET, FILM COATED ORAL at 09:15

## 2021-01-01 RX ADMIN — METRONIDAZOLE 500 MG: 500 INJECTION, SOLUTION INTRAVENOUS at 15:06

## 2021-01-01 RX ADMIN — Medication 10 ML: at 20:36

## 2021-01-01 RX ADMIN — METRONIDAZOLE 500 MG: 500 INJECTION, SOLUTION INTRAVENOUS at 01:31

## 2021-01-01 RX ADMIN — VASOPRESSIN 2 UNITS: 20 INJECTION INTRAVENOUS at 11:30

## 2021-01-01 RX ADMIN — SODIUM CHLORIDE: 9 INJECTION, SOLUTION INTRAVENOUS at 23:17

## 2021-01-01 RX ADMIN — PANTOPRAZOLE SODIUM 40 MG: 40 INJECTION, POWDER, FOR SOLUTION INTRAVENOUS at 22:21

## 2021-01-01 RX ADMIN — CIPROFLOXACIN 400 MG: 2 INJECTION, SOLUTION INTRAVENOUS at 22:13

## 2021-01-01 RX ADMIN — TROSPIUM CHLORIDE 20 MG: 20 TABLET, FILM COATED ORAL at 09:37

## 2021-01-01 RX ADMIN — TETROFOSMIN 10 MILLICURIE: 1.38 INJECTION, POWDER, LYOPHILIZED, FOR SOLUTION INTRAVENOUS at 09:48

## 2021-01-01 RX ADMIN — APIXABAN 5 MG: 5 TABLET, FILM COATED ORAL at 20:52

## 2021-01-01 RX ADMIN — TROSPIUM CHLORIDE 20 MG: 20 TABLET, FILM COATED ORAL at 20:48

## 2021-01-01 RX ADMIN — SODIUM CHLORIDE 25 ML: 9 INJECTION, SOLUTION INTRAVENOUS at 09:20

## 2021-01-01 RX ADMIN — METRONIDAZOLE 500 MG: 500 INJECTION, SOLUTION INTRAVENOUS at 14:13

## 2021-01-01 RX ADMIN — Medication 10 ML: at 20:26

## 2021-01-01 RX ADMIN — APIXABAN 5 MG: 5 TABLET, FILM COATED ORAL at 21:10

## 2021-01-01 RX ADMIN — APIXABAN 5 MG: 5 TABLET, FILM COATED ORAL at 20:40

## 2021-01-01 RX ADMIN — METRONIDAZOLE 500 MG: 500 INJECTION, SOLUTION INTRAVENOUS at 06:25

## 2021-01-01 RX ADMIN — APIXABAN 5 MG: 5 TABLET, FILM COATED ORAL at 08:50

## 2021-01-01 RX ADMIN — TROSPIUM CHLORIDE 20 MG: 20 TABLET, FILM COATED ORAL at 22:00

## 2021-01-01 RX ADMIN — SODIUM CHLORIDE: 9 INJECTION, SOLUTION INTRAVENOUS at 14:11

## 2021-01-01 RX ADMIN — METOCLOPRAMIDE 5 MG: 5 INJECTION, SOLUTION INTRAMUSCULAR; INTRAVENOUS at 20:22

## 2021-01-01 RX ADMIN — METOCLOPRAMIDE 5 MG: 5 INJECTION, SOLUTION INTRAMUSCULAR; INTRAVENOUS at 14:34

## 2021-01-01 RX ADMIN — Medication 10 ML: at 07:54

## 2021-01-01 RX ADMIN — SODIUM CHLORIDE: 9 INJECTION, SOLUTION INTRAVENOUS at 05:42

## 2021-01-01 RX ADMIN — METOCLOPRAMIDE 5 MG: 5 INJECTION, SOLUTION INTRAMUSCULAR; INTRAVENOUS at 22:10

## 2021-01-01 RX ADMIN — MAGNESIUM SULFATE HEPTAHYDRATE 4000 MG: 40 INJECTION, SOLUTION INTRAVENOUS at 09:21

## 2021-01-01 RX ADMIN — TROSPIUM CHLORIDE 20 MG: 20 TABLET, FILM COATED ORAL at 21:45

## 2021-01-01 RX ADMIN — APIXABAN 5 MG: 5 TABLET, FILM COATED ORAL at 08:25

## 2021-01-01 RX ADMIN — METOCLOPRAMIDE 5 MG: 5 INJECTION, SOLUTION INTRAMUSCULAR; INTRAVENOUS at 09:41

## 2021-01-01 RX ADMIN — SODIUM CHLORIDE, SODIUM LACTATE, POTASSIUM CHLORIDE, CALCIUM CHLORIDE AND DEXTROSE MONOHYDRATE: 5; 600; 310; 30; 20 INJECTION, SOLUTION INTRAVENOUS at 06:29

## 2021-01-01 RX ADMIN — OXYBUTYNIN CHLORIDE 5 MG: 5 TABLET, EXTENDED RELEASE ORAL at 09:14

## 2021-01-01 RX ADMIN — METRONIDAZOLE 500 MG: 250 TABLET ORAL at 21:30

## 2021-01-01 ASSESSMENT — PULMONARY FUNCTION TESTS
PIF_VALUE: 0
PIF_VALUE: 0
PIF_VALUE: 1
PIF_VALUE: 0
PIF_VALUE: 1

## 2021-01-01 ASSESSMENT — PAIN SCALES - WONG BAKER

## 2021-01-01 ASSESSMENT — PAIN SCALES - GENERAL
PAINLEVEL_OUTOF10: 0
PAINLEVEL_OUTOF10: 7
PAINLEVEL_OUTOF10: 0
PAINLEVEL_OUTOF10: 8
PAINLEVEL_OUTOF10: 9
PAINLEVEL_OUTOF10: 0
PAINLEVEL_OUTOF10: 9
PAINLEVEL_OUTOF10: 0
PAINLEVEL_OUTOF10: 8
PAINLEVEL_OUTOF10: 0
PAINLEVEL_OUTOF10: 8
PAINLEVEL_OUTOF10: 0
PAINLEVEL_OUTOF10: 5
PAINLEVEL_OUTOF10: 0
PAINLEVEL_OUTOF10: 0
PAINLEVEL_OUTOF10: 10
PAINLEVEL_OUTOF10: 0
PAINLEVEL_OUTOF10: 8
PAINLEVEL_OUTOF10: 0
PAINLEVEL_OUTOF10: 8
PAINLEVEL_OUTOF10: 0
PAINLEVEL_OUTOF10: 9
PAINLEVEL_OUTOF10: 0
PAINLEVEL_OUTOF10: 8
PAINLEVEL_OUTOF10: 0
PAINLEVEL_OUTOF10: 0
PAINLEVEL_OUTOF10: 7
PAINLEVEL_OUTOF10: 0
PAINLEVEL_OUTOF10: 0
PAINLEVEL_OUTOF10: 3
PAINLEVEL_OUTOF10: 0
PAINLEVEL_OUTOF10: 9
PAINLEVEL_OUTOF10: 0
PAINLEVEL_OUTOF10: 0

## 2021-01-01 ASSESSMENT — PAIN DESCRIPTION - LOCATION
LOCATION: HEAD
LOCATION: KNEE
LOCATION: HEAD
LOCATION: GENERALIZED

## 2021-01-01 ASSESSMENT — ENCOUNTER SYMPTOMS
TROUBLE SWALLOWING: 0
EYE REDNESS: 0
NAUSEA: 0
VOMITING: 0
COUGH: 0
DIARRHEA: 0
SHORTNESS OF BREATH: 0
SHORTNESS OF BREATH: 0
RHINORRHEA: 0
ABDOMINAL PAIN: 0
SORE THROAT: 0
DIARRHEA: 0
WHEEZING: 0
EYE DISCHARGE: 0
NAUSEA: 0
COUGH: 0
RHINORRHEA: 0
ABDOMINAL PAIN: 0
CONSTIPATION: 0
BACK PAIN: 0

## 2021-01-01 ASSESSMENT — PAIN DESCRIPTION - PAIN TYPE
TYPE: ACUTE PAIN

## 2021-01-01 ASSESSMENT — PAIN DESCRIPTION - ORIENTATION
ORIENTATION: LEFT
ORIENTATION: LEFT;ANTERIOR
ORIENTATION: LEFT

## 2021-01-01 ASSESSMENT — PAIN DESCRIPTION - DESCRIPTORS
DESCRIPTORS: HEADACHE

## 2021-03-30 PROBLEM — R55 SYNCOPE AND COLLAPSE: Status: ACTIVE | Noted: 2021-01-01

## 2021-03-30 NOTE — ED PROVIDER NOTES
BP Temp Temp Source Pulse Resp SpO2 Height Weight   03/30/21 1431 03/30/21 1431 03/30/21 1431 03/30/21 1431 03/30/21 1431 03/30/21 1431 03/30/21 1428 03/30/21 1428   (!) 130/50 97.4 °F (36.3 °C) Oral 56 16 96 % 5' 4\" (1.626 m) 150 lb (68 kg)       Physical Exam  Vitals signs and nursing note reviewed. Constitutional:       Appearance: He is well-developed. He is not diaphoretic. HENT:      Head: Normocephalic and atraumatic. Right Ear: External ear normal.      Left Ear: External ear normal.      Nose: Nose normal.      Mouth/Throat:      Mouth: Mucous membranes are moist.      Pharynx: Oropharynx is clear. No posterior oropharyngeal erythema. Eyes:      General:         Right eye: No discharge. Left eye: No discharge. Extraocular Movements: Extraocular movements intact. Conjunctiva/sclera: Conjunctivae normal.      Pupils: Pupils are equal, round, and reactive to light. Neck:      Musculoskeletal: Normal range of motion and neck supple. Trachea: No tracheal deviation. Cardiovascular:      Rate and Rhythm: Regular rhythm. Bradycardia present. Comments: No lower leg edema  Pulmonary:      Effort: Pulmonary effort is normal. No respiratory distress. Breath sounds: Normal breath sounds. No wheezing or rales. Abdominal:      General: Bowel sounds are normal. There is no distension. Palpations: Abdomen is soft. Tenderness: There is no abdominal tenderness. There is no guarding. Musculoskeletal: Normal range of motion. Skin:     General: Skin is warm and dry. Neurological:      General: No focal deficit present. Mental Status: He is alert and oriented to person, place, and time. GCS: GCS eye subscore is 4. GCS verbal subscore is 5. GCS motor subscore is 6. Cranial Nerves: Cranial nerves are intact. Sensory: Sensation is intact. Motor: Motor function is intact.    Psychiatric:         Behavior: Behavior normal.         DIAGNOSTIC RESULTS   LABS:    Labs Reviewed   CBC WITH AUTO DIFFERENTIAL - Abnormal; Notable for the following components:       Result Value    RBC 4.03 (*)     Hemoglobin 13.1 (*)     Hematocrit 38.2 (*)     All other components within normal limits    Narrative:     Performed at:  [facility] [address]  [employer]   Phone [phone]   COMPREHENSIVE METABOLIC PANEL - Abnormal; Notable for the following components:    CREATININE 1.5 (*)     GFR Non- 45 (*)     GFR  54 (*)     All other components within normal limits    Narrative:     Performed at:  [facility] [address]  [employer]   Phone [phone]   PROTIME-INR - Abnormal; Notable for the following components:    Protime 21.7 (*)     INR 1.86 (*)     All other components within normal limits    Narrative:     Performed at:  [facility] [address]  [employer]   Phone [phone]   TROPONIN    Narrative:     Performed at:  [facility] [address]  [employer]   Phone [phone]   APTT    Narrative:     Performed at:  [facility] [address]  [employer]   Phone [phone]    Narrative:     Performed at:  [facility] [address]  [employer]   Phone [phone]   URINE RT REFLEX TO CULTURE       All other labs were within normal range or not returned as of this dictation. EKG: All EKG's are interpreted by the Emergency Department Physician in the absence of a cardiologist.  Please see their note for interpretation of EKG.       RADIOLOGY:   Non-plain film images such as CT, Ultrasound and MRI are read by the radiologist. Plain radiographic images are visualized and preliminarily interpreted by the ED Provider with the below findings:        Interpretation per the Radiologist below, if available at the time of this note:    CT CERVICAL SPINE WO CONTRAST   Final Result   No acute abnormality of the cervical spine. CT HEAD WO CONTRAST   Preliminary Result   1. No evidence of acute intracranial abnormality. 2. Findings consistent with presence of lobulated/bilobed pituitary mass   centered in the region of the sella turcica with extension into the left   sphenoid locule. Short-term follow-up MRI examination with pituitary   protocol would be helpful for more complete evaluation. XR CHEST PORTABLE   Final Result   No acute process. Ct Head Wo Contrast    Result Date: 3/30/2021  EXAMINATION: CT OF THE HEAD WITHOUT CONTRAST  3/30/2021 3:06 pm TECHNIQUE: CT of the head was performed without the administration of intravenous contrast. Dose modulation, iterative reconstruction, and/or weight based adjustment of the mA/kV was utilized to reduce the radiation dose to as low as reasonably achievable. COMPARISON: None. HISTORY: ORDERING SYSTEM PROVIDED HISTORY: fall syncope TECHNOLOGIST PROVIDED HISTORY: Reason for exam:->fall syncope Has a \"code stroke\" or \"stroke alert\" been called? ->No Decision Support Exception->Emergency Medical Condition (MA) Reason for Exam: loc Acuity: Acute Type of Exam: Initial FINDINGS: BRAIN/VENTRICLES: The ventricular system is normal in appearance. No evidence of mass effect or midline shift. Prominence of sulci overlying convexities of cerebral hemispheres and cerebellum consistent with atrophy. There is a lobulated somewhat bilobed shaped mass centered in the region of the sella turcica. The sellar portion of the mass results in posterior displacement/partial erosion of the posterior margin of the sella. There is also mild superior extension of the lesion into the region of the suprasellar cistern.   There is extension of portion of the lesion into the region of the left otherwise noted below, none     Procedures    CRITICAL CARE TIME   N/A    CONSULTS:  IP CONSULT TO HOSPITALIST  IP CONSULT TO PHARMACY      EMERGENCY DEPARTMENT COURSE and DIFFERENTIAL DIAGNOSIS/MDM:   Vitals:    Vitals:    03/30/21 1600 03/30/21 1630 03/30/21 1700 03/30/21 1715   BP: (!) 128/51 (!) 133/57 133/73    Pulse: 50 54 57 52   Resp: 12 15 13 15   Temp:       TempSrc:       SpO2: 98% 97% 97% 98%   Weight:       Height:           Patient was given the following medications:  Medications - No data to display        Briefly, this is an 39-year-old male who presents to the emergency department today with his daughter for evaluation for a syncopal episode which occurred early before arriving to the ED. The daughter states that the patient was walking in the store, was complaining of lightheadedness, and then had a syncopal episode and was out for less than 1 minute. He is on Coumadin secondary to history of DVT, no seizure-like activity reported. When the patient arrives to the ED he is asymptomatic otherwise has no complaints at this time. His physical exam is unremarkable, there are no neurological deficits. EKG seconded by my attending, please see his note for further details    CBC shows no evidence of leukocytosis, there is a mild anemia. His CMP is a creatinine of 1.5, with a chronic for the patient. Troponin negative. INR is 1.86. CT of head shows no acute abnormality he does have a lobulated/bilobed pituitary mass. Of cervical spine is negative    Chest x-ray is negative    Due to the patient's episode of syncope, I feel that he will need to be admitted for further care and evaluation, hospitalist has been paged for admission, the patient and family are updated and agreeable with plan. Stable for admission, hospitalist has agreed for admission    FINAL IMPRESSION      1. Syncope and collapse    2.  Anticoagulated on Coumadin          DISPOSITION/PLAN   DISPOSITION Admitted 03/30/2021 04:58:03 PM      PATIENT REFERREDTO:  No follow-up provider specified.     DISCHARGE MEDICATIONS:  New Prescriptions    No medications on file       DISCONTINUED MEDICATIONS:  Discontinued Medications    No medications on file              (Please note that portions of this note were completed with a voice recognition program.  Efforts were made to edit the dictations but occasionally words are mis-transcribed.)    Dori Campo PA-C (electronically signed)            Dori Campo PA-C  03/30/21 0300

## 2021-03-30 NOTE — H&P
Hospital Medicine History and Physical    3/30/2021    Date of Admission: 3/30/2021    Date of Service: Pt seen/examined on 3/30/2021 and admitted to inpatient. Assessment/plan:  1. Syncope and collapse. Likely orthostatic etiology, especially with report of low blood pressure at the time of syncopal event. Hold antihypertensive medications. Check orthostatic vitals. Monitor on telemetry. Check serial troponin. Start gentle intravenous fluid. Check echocardiogram.  2. 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 INR is subtherapeutic at 1.86. Pharmacy consult to assist with Coumadin dosing. 3. Other comorbidities: History of dementia, essential hypertension, history of intracranial hemorrhage, known history of pituitary mass (being followed at [facility] and [facility]), history of \"aneurysm\" (per neurology visit documented in care everywhere, neurology is not recommending follow-up, given patient's age)  3. Other issues: Currently, there is no access to patient's home medications and daughter does not know his home medications of hand. Past medical history documented per daughter's report. Surgical history is currently unclear at this time. I have sent a nursing communication to request for patient's record from [facility]. Activities: Up with assist  Prophylaxis: Anticoagulation with Coumadin  Code status: Full code    ==========================================================  Chief complaint:  Chief Complaint   Patient presents with    Loss of Consciousness     Pt arrived via [facility] squad after feeling weak and having a syncopal episode at the store. Squad reports pt hypotensive en route. History of Presenting Illness:   This is a pleasant 78 y.o. 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 Patient was at this time with his daughter when he had a syncopal event. He had generalized weakness prior to passing out. He had similar episode recently. In the course of passing out today, he did hit his head. CT-head without contrast was unremarkable for intracranial bleed; reveals known pituitary mass which according to daughter, is being followed as outpatient. Patient is being admitted for further evaluation of syncope. Past Medical History:      Diagnosis Date    Arthritis     Dementia [facility] [address]     Enlarged prostate     Essential hypertension     Foot pain 03/30/2021    complaints of foot pain today    History of intracranial hemorrhage     Overactive bladder     Pituitary mass (HCC)     Right leg DVT ([facility] [address]        Past Surgical History:      Procedure Laterality Date    APPENDECTOMY  1959    MOUTH SURGERY  2020    molars removed       Medications (prior to admission):  Prior to Admission medications    Not on File       Allergy(ies):  Keflex [cephalexin]    Social History:  TOBACCO:  reports that he has never smoked. He has never used smokeless tobacco.  ETOH:  reports no history of alcohol use. Family History:      Problem Relation Age of Onset    Diabetes Sister        Review of Systems:  Pertinent positives are listed in HPI. At least 10-point ROS reviewed and were negative. Vitals and physical examination:  BP (!) 128/51   Pulse 50   Temp 97.4 °F (36.3 °C) (Oral)   Resp 12   Ht 5' 4\" (1.626 m)   Wt 150 lb (68 kg)   SpO2 98%   BMI 25.75 kg/m²   Gen/overall appearance: Not in acute distress. Alert. Oriented. Head: Normocephalic, atraumatic  Eyes: EOMI, good acuity  ENT: Oral mucosa moist  Neck: No JVD, thyromegaly  CVS: Nml S1S2, no MRG, RRR  Pulm: Clear bilaterally.  No crackles/wheezes  Gastrointestinal: Soft, NT/ND, +BS  Musculoskeletal: No sella with extension of portion of the lesion into the left sphenoid locule. In sagittal plane, the mass measures 3 cm in AP dimension x approximately 1.6 cm in maximum height. 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 No abnormal extra-axial fluid collection is identified. Atherosclerotic calcification of distal internal carotid and vertebral arteries is identified. ORBITS: The visualized portion of the orbits demonstrate no acute abnormality. SINUSES: 1 cm sharply marginated rounded lesion identified within the medial right frontal sinus (image 11). Finding may represent mucous retention cyst or polyp. There is minimal mucosal thickening within a few ethmoid air cells. Minimal mucosal thickening with suggestion of small air-fluid/mucus level identified in the right sphenoid locule. Mastoid air cells are well aerated. SOFT TISSUES/SKULL:  No acute abnormality of the visualized skull or soft tissues. 1. No evidence of acute intracranial abnormality. 2. Findings consistent with presence of lobulated/bilobed pituitary mass centered in the region of the sella turcica with extension into the left sphenoid locule. Short-term follow-up MRI examination with pituitary protocol would be helpful for more complete evaluation. Ct Cervical Spine Wo Contrast    Result Date: 3/30/2021  EXAMINATION: CT OF THE CERVICAL SPINE WITHOUT CONTRAST 3/30/2021 12:07 pm TECHNIQUE: CT of the cervical spine was performed without the administration of intravenous contrast. Multiplanar reformatted images are provided for review. Dose modulation, iterative reconstruction, and/or weight based adjustment of the mA/kV was utilized to reduce the radiation dose to as low as reasonably achievable. COMPARISON: None.  HISTORY: ORDERING SYSTEM PROVIDED HISTORY: fall, synocpe TECHNOLOGIST PROVIDED HISTORY: Reason for exam:->fall, synocpe Decision Support Exception->Emergency Medical Condition (MA) Reason for Exam: LOC Acuity: Acute Type of Exam: Initial FINDINGS: BONES/ALIGNMENT: No acute fracture or traumatic malalignment. DEGENERATIVE CHANGES: Multilevel degenerative disc and facet disease noted. No high-grade central canal stenosis is found. SOFT TISSUES: The prevertebral soft tissues are unremarkable for the patient's age. Visualized lungs are clear. No acute abnormality of the cervical spine. Xr Chest Portable    Result Date: 3/30/2021  EXAMINATION: ONE XRAY VIEW OF THE CHEST 3/30/2021 3:02 pm COMPARISON: None. HISTORY: ORDERING SYSTEM PROVIDED HISTORY: SOB TECHNOLOGIST PROVIDED HISTORY: Reason for exam:->SOB Acuity: Unknown FINDINGS: The lungs are without acute focal process. There is no effusion or pneumothorax. The cardiomediastinal silhouette is without acute process. The osseous structures are without acute process. No acute process. EKG: Sinus bradycardia with first-degree AV block, left bundle branch block. Nonspecific ST/T changes. I reviewed EKG. Discussed with ER provider. Thank you No primary care provider on file.  for the opportunity to be involved in this patient's care.    -----------------------------  Adina Devine MD  [facility] Hospitalist

## 2021-03-30 NOTE — ED PROVIDER NOTES
deg AVB, LBBB which limits further interpretation  Prior EKG comparison: No prior is currently available for comparison    MDM:  Diagnostic considerations included seizure, stroke, TIA, intracranial bleed, trauma, vasovagal reaction, orthostatic reaction/dehydration, medication side effect, intoxication, metabolic/electrolyte disturbance, blood sugar disturbance, cardiac dysrhythmia    ED course was notable for syncopal events, without stigmata of seizure per the daughter, head CT without acute findings, known pituitary mass. No previous imaging in our system for comparison no. This is likely an incidental finding though. Unrelated to syncopal presentation. EKG is a first-degree AV block with bradycardia and a left bundle branch block without previous for comparison. Prior records at the [address] [facility]. INR is slightly subtherapeutic. CT the cervical spine is negative. No other injuries apparent on exam or by history. He is not hypotensive here. He will be admitted for hemodynamic monitoring and further syncope work-up. CLINICAL IMPRESSION  1. Syncope and collapse    2. Anticoagulated on Coumadin        DISPOSITION  Vannessa Sanabria was admitted in fair condition. The plan is to admit to the hospital at this time under the hospitalist service. Hospitalist accepted the patient and will take over the patient's care. This chart was created using Dragon dictation software. Efforts were made by me to ensure accuracy, however some errors may be present due to limitations of this technology.             Arnoldo Guadarrama MD  03/30/21 [id number]

## 2021-03-31 NOTE — CARE COORDINATION
Discharge Planning Assessment    RN/SW discharge planner met with patient/ (and family member) to discuss reason for admission, current living situation, and potential needs at the time of discharge    Demographics/Insurance verified Yes    Current type of dwelling:   house    Patient from ECF/SW confirmed with:   N/A    Living arrangements: With daughter Faraz Chavez    Level of function/Support: independent    PCP:  Dr. Bharathi Harris at [facility]    DME: None    Active with any community resources/agencies/skilled home care:NO    Medication compliance issues:  No - Uses [facility]    Financial issues that could impact healthcare:  NO    Tentative discharge plan:  Home w/hhc & walker    Discussed with patient and/or family that on the day of discharge home tentative time of discharge will be between 10 AM and noon. Transportation at the time of discharge:   Daughter    Met with patient at bedside and then spoke with his daughter on the telephone. Will need to notify Dr. Carol Giron office of need or HHC, walker, and any discharge meds. Case management will follow progress and assist with discharge planning as needed.     Yasemin Hughes RN BSN  Case Management  [phone]

## 2021-03-31 NOTE — PROGRESS NOTES
Occupational /Physical Therapy  Vannessa Sanabria  HOLD NOTE  Vannessa Sanabria      Attempted to see patient this date, patient currently on hold due to patient ARYAN for echo. Discussed with RN Yunier Ash . Will follow up as medically appropriate and schedule allows. Thank you,   Ana Brown.  Horacio Mendez, OTR/L [id number] [facility], DPT [id number]

## 2021-03-31 NOTE — PROGRESS NOTES
Physical Therapy    Facility/Department: [address]  Initial Assessment    NAME: Yany Calderon  : 1940  MRN: [id number]    Date of Service: 3/31/2021    Discharge Recommendations:  Yany Calderon scored a 18/24 on the AM-PAC short mobility form. Current research shows that an AM-PAC score of 18 or greater is typically associated with a discharge to the patient's home setting. Based on the patient's AM-PAC score and their current functional mobility deficits, it is recommended that the patient have 2-3 sessions per week of Physical Therapy at d/c to increase the patient's independence. At this time, this patient demonstrates the endurance and safety to discharge home with home services and a follow up treatment frequency of 2-3x/wk. Please see assessment section for further patient specific details. If patient discharges prior to next session this note will serve as a discharge summary. Please see below for the latest assessment towards goals. 2-3 sessions per week   PT Equipment Recommendations  Equipment Needed: Yes  Mobility Devices: Taffy Maid: Rolling    Assessment   Body structures, Functions, Activity limitations: Decreased functional mobility ; Decreased endurance;Decreased balance  Assessment: Pt presenting close to his baseline. Asymptomatic of dizziness throughout mobility. Systolic BP did drop 68+ points, however. Use of RW recommended due to some left leg pain causing limp. Continued skilled PT to promote return towards PLOF. Recommend 24 hours initially due to recent syncopal episode.   Prognosis: Good  Decision Making: Low Complexity  PT Education: Goals;PT Role;Plan of Care  Patient Education: verbalized understanding but may need reinforcement  Barriers to Learning: cognition  REQUIRES PT FOLLOW UP: Yes  Activity Tolerance  Activity Tolerance: Patient Tolerated treatment well  Activity Tolerance: BP after ambulation 154/70       Patient Diagnosis(es): The primary encounter diagnosis was Syncope and collapse. A diagnosis of Anticoagulated on Coumadin was also pertinent to this visit. has a past medical history of Arthritis, Brain aneurysm, Dementia ([address] Enlarged prostate, Essential hypertension, Foot pain, History of intracranial hemorrhage, Overactive bladder, Pituitary mass ([address] and Right leg DVT ([facility] [address] has a past surgical history that includes Appendectomy (1959) and Mouth surgery (2020). Restrictions  Restrictions/Precautions  Restrictions/Precautions: Fall Risk(high fall risk, low Na diet)  Position Activity Restriction  Other position/activity restrictions: Nanci Price is a 78 y.o. male who presents to the emergency department today with his daughter for evaluation for a syncopal episode. The patient is seen by the [place], she states that he has a history of DVT, and is on Coumadin. The patient also has a history of a brain tumor, and she states that he does have a small aneurysm towards the back of his brain. She states that today the patient got up, she states that he ate breakfast around 9 AM this morning and is otherwise had anything to eat or drink. The patient was walking around the store, the patient states that he started to feel lightheaded, and the patient did have a syncopal episode. He did fall and hit his head. The daughter states that the patient was out for less than 1 minute there was no seizure-like activity reported. When EMS arrived, the patient was hypotensive, when he arrives to the ED blood pressure is 127/41. When the patient arrives to the ED he states he is asymptomatic at this time. He has no headaches. He has no visual changes. He has no numbness, tingling or weakness. He has no chest pain or shortness of breath. He denies any abdominal pain, nausea, vomiting or diarrhea. No urinary symptoms. No recent illnesses including fever, cough.   No other complaints at this time  Vision/Hearing  Vision: Impaired  Vision Exceptions: Wears glasses for reading  Hearing: Within functional limits     Subjective  General  Chart Reviewed: Yes  Diagnosis: syncope and collapse  General Comment  Comments: sitting in chair at arrival  Subjective  Subjective: agreed to evaluation  Pain Screening  Patient Currently in Pain: Denies  Vital Signs  Orthostatic B/P and Pulse?: Yes  Blood Pressure Sittin/81  Pulse Sittin PER MINUTE  Blood Pressure Standin/58  Pulse Standin PER MINUTE  Patient Currently in Pain: Denies  Orthostatic B/P and Pulse?: Yes       Orientation  Orientation  Overall Orientation Status: Within Functional Limits  Social/Functional History  Social/Functional History  Lives With: Daughter(granddtg)  Type of Home: House  Home Layout: Two level(bilevel, pt on top level)  Home Access: Stairs to enter with rails  Entrance Stairs - Number of Steps: unsure of # and which side railing is one  Bathroom Shower/Tub: Tub/Shower unit  Bathroom Toilet: Standard  Home Equipment: [employer], 4 wheeled walker  ADL Assistance: Independent  Homemaking Assistance: Needs assistance  Homemaking Responsibilities: No  Ambulation Assistance: Independent(with SPC at times)  Transfer Assistance: Independent  Active : No  Leisure & Hobbies: none mentioned  Additional Comments: 1 recent fall resulting in hospital admit, dtg home during the day most of the time  Cognition        Objective          AROM RLE (degrees)  RLE AROM: WFL  RLE General AROM: except for HS tightness  AROM LLE (degrees)  LLE AROM : WFL  LLE General AROM: except for HS tightness  Strength RLE  Strength RLE: WFL  Comment: functional for mobility  Strength LLE  Strength LLE: WFL  Comment: functional for mobiltiy        Bed mobility  Comment: not observed  Transfers  Sit to Stand: Contact guard assistance(from chair x 2 reps)  Stand to sit: Contact guard assistance  Comment: vc for hand placement when walker present  Ambulation  Ambulation?: Yes  Ambulation 1  Surface: level tile  Device: Rolling Walker  Assistance: Contact guard assistance  Quality of Gait: antalgic gait due to pain in LLE (appears arthritic and states it gives him problems time to time)  Gait Deviations: Slow Vanessa;Decreased step length  Distance: 50 ft  Stairs/Curb  Stairs?: No     Balance  Posture: Good  Sitting - Static: Good  Sitting - Dynamic: Fair;+  Standing - Static: Fair;+  Standing - Dynamic: [address]  Times per week: 3-5/wk  Current Treatment Recommendations: Strengthening, Balance Training, Functional Mobility Training, Gait Training, Transfer Training, Stair training, Endurance Training  Safety Devices  Type of devices:  All fall risk precautions in place, Call light within reach, Left in chair, Nurse notified, Chair alarm in place    G-Code       OutComes Score                                                  AM-PAC Score  AM-PAC Inpatient Mobility Raw Score : 18 (03/31/21 1527)  AM-PAC Inpatient T-Scale Score : 43.63 (03/31/21 1527)  Mobility Inpatient CMS 0-100% Score: 46.58 (03/31/21 1527)  Mobility Inpatient CMS G-Code Modifier : CK (03/31/21 1527)          Goals  Short term goals  Time Frame for Short term goals: to be met by discharge  Short term goal 1: pt able to perform bed mobility independently  Short term goal 2: pt able to perform transfers with supervision  Short term goal 3: pt able to ambulate 100 ft with LRAD and supervision  Short term goal 4: pt able to navigate 6 steps with handrailing and SPC with supervision  Patient Goals   Patient goals : did not state       Therapy Time   Individual Concurrent Group Co-treatment   Time In 1500         Time Out 1524         Minutes 24         Timed Code Treatment Minutes: [address] [id number]

## 2021-03-31 NOTE — PROGRESS NOTES
Orthostatic Blood Pressure:      Blood pressure:    lying KRISTINA, sitting 142/81, standing 113/58    Pulse:    lying KRISTINA, sitting 66, standing 85    Patient asymptomatic. Denies Dizziness.

## 2021-03-31 NOTE — PROGRESS NOTES
[facility] PROGRESS NOTE    3/31/2021 10:38 AM        Name: Marbin Kline . Admitted: 3/30/2021  Primary Care Provider: No primary care provider on file. (Tel: None)          Subjective:    Patient sitting in chair having no chest pain or shortness of breath no fevers or chills no dizziness    Reviewed interval ancillary notes    Current Medications  0.9 % sodium chloride infusion, Continuous  sodium chloride flush 0.9 % injection 10 mL, 2 times per day  sodium chloride flush 0.9 % injection 10 mL, PRN  0.9 % sodium chloride infusion, PRN  promethazine (PHENERGAN) tablet 12.5 mg, Q6H PRN    Or  ondansetron (ZOFRAN) injection 4 mg, Q6H PRN  polyethylene glycol (GLYCOLAX) packet 17 g, Daily PRN  acetaminophen (TYLENOL) tablet 650 mg, Q6H PRN    Or  acetaminophen (TYLENOL) suppository 650 mg, Q6H PRN  apixaban (ELIQUIS) tablet 5 mg, BID        Objective:  /64   Pulse 86   Temp 98 °F (36.7 °C) (Oral)   Resp 18   Ht 5' 4\" (1.626 m)   Wt 152 lb 14.4 oz (69.4 kg)   SpO2 98%   BMI 26.25 kg/m²   No intake or output data in the 24 hours ending 03/31/21 1038   Wt Readings from Last 3 Encounters:   03/31/21 152 lb 14.4 oz (69.4 kg)       General appearance:  Appears comfortable. AAOx3  HEENT: atraumatic, Pupils equal, muscous membranes moist, no masses appreciated  Cardiovascular: Regular rate and rhythm no murmurs appreciated  Respiratory: CTAB no wheezing  Gastrointestinal: Abdomen soft, non-tender, BS+  EXT: no edema  Neurology: no gross focal deficts no dysmetria  Psychiatry: Appropriate affect.  Not agitated  Skin: Warm, dry, no rashes appreciated    Labs and Tests:  CBC:   Recent Labs     03/30/21  1443 03/31/21  0415   WBC 6.1 5.9   HGB 13.1* 12.4*    177     BMP:    Recent Labs     03/30/21  1443 03/31/21  0415    138   K 4.1 4.9    105   CO2 24 27   BUN 18 17   CREATININE 1.5* 1.5*

## 2021-04-01 NOTE — DISCHARGE INSTR - COC
Continuity of Care Form    Patient Name: Srinivas Bedoya   :  1940  MRN:  [id number]    Admit date:  3/30/2021  Discharge date:  21    Code Status Order: Full Code   Advance Directives:   [facility] Documentation     Date/Time Healthcare Directive Type of Healthcare Directive Copy in [address] Agent's Name Healthcare Agent's Phone Number    21 1671  Yes, patient has an advance directive for healthcare treatment  Durable power of  for health care;Living will  No, copy requested from family  Adult Children  --  --          Admitting Physician:  Mateo Hair MD  PCP: No primary care provider on file. Discharging Nurse: Rajani Johnston RN  [address] Unit/Room#: 7VP-2778/3530-43  Discharging Unit Phone Number: [phone]    Emergency Contact:   Extended Emergency Contact Information  Primary Emergency Contact: Mary Ann Zurita  [place] Phone: [phone]  Relation: Child  Secondary Emergency Contact: Jasbir Ku, [address] [place] [address]. Phone: [phone]  Relation: Grandchild    Past Surgical History:  Past Surgical History:   Procedure Laterality Date    APPENDECTOMY      MOUTH SURGERY      molars removed       Immunization History: There is no immunization history on file for this patient.     Active Problems:  Patient Active Problem List   Diagnosis Code    Syncope and collapse R55       Isolation/Infection:   Isolation          No Isolation        Patient Infection Status     None to display          Nurse Assessment:  Last Vital Signs: /74   Pulse 57   Temp 98.3 °F (36.8 °C) (Oral)   Resp 16   Ht 5' 4\" (1.626 m)   Wt 153 lb 14.4 oz (69.8 kg)   SpO2 96%   BMI 26.42 kg/m²     Last documented pain score (0-10 scale): Pain Level: 3  Last Weight:   Wt Readings from Last 1 Encounters:   21 153 lb 14.4 oz (69.8 kg)     Mental Status:  oriented and alert    IV Access:  - None    Nursing Mobility/ADLs:  Walking Assisted  Transfer  Assisted  Bathing  Assisted  Dressing  Assisted  Toileting  Assisted  Feeding  Assisted  Med Admin  Assisted  Med Delivery   whole    Wound Care Documentation and Therapy:        Elimination:  Continence:   · Bowel: Yes  · Bladder: No  Urinary Catheter: None   Colostomy/Ileostomy/Ileal Conduit: No       Date of Last BM: 3-30-21    Intake/Output Summary (Last 24 hours) at 4/1/2021 1050  Last data filed at 4/1/2021 0603  Gross per 24 hour   Intake 1720 ml   Output 1300 ml   Net 420 ml     I/O last 3 completed shifts: In: 1960 [P.O.:720; I.V.:1240]  Out: 1300 [Urine:1300]    Safety Concerns:     History of Falls (last 30 days) and At Risk for Falls    Impairments/Disabilities:      None    Nutrition Therapy:  Current Nutrition Therapy:   - Oral Diet:  General    Routes of Feeding: Oral  Liquids: No Restrictions  Daily Fluid Restriction: yes - amount 2000  Last Modified Barium Swallow with Video (Video Swallowing Test): not done    Treatments at the Time of Hospital Discharge:   Respiratory Treatments: None  Oxygen Therapy:  is not on home oxygen therapy.   Ventilator:    - No ventilator support    Rehab Therapies: Physical Therapy, Occupational Therapy and Speech/Language Therapy  Weight Bearing Status/Restrictions: No weight bearing restirctions  Other Medical Equipment (for information only, NOT a DME order):  cane, walker, bath bench and bedside commode  Other Treatments: None    Patient's personal belongings (please select all that are sent with patient):  None    RN SIGNATURE:  Electronically signed by Adenike Baez RN on 4/1/21 at 11:08 AM EDT    CASE MANAGEMENT/SOCIAL WORK SECTION    Inpatient Status Date: ***    Readmission Risk Assessment Score:  Readmission Risk              Risk of Unplanned Readmission:        12           Discharging to Facility/ Agency     / signature: {Esignature:456253692}    PHYSICIAN SECTION    Prognosis: {Prognosis:8534695342}    Condition at Discharge: Tanya8 Ashley Orozco Patient Condition:884014987}    Rehab Potential (if transferring to Rehab): {Prognosis:1856780115}    Recommended Labs or Other Treatments After Discharge: ***    Physician Certification: I certify the above information and transfer of Katie Pizaror  is necessary for the continuing treatment of the diagnosis listed and that he requires {Admit to Appropriate Level of Care:48143} for {GREATER/LESS:366088379} 30 days.      Update Admission H&P: {CHP DME Changes in HHGEK:938392555}    PHYSICIAN SIGNATURE:  {Esignature:333197651}

## 2021-04-01 NOTE — PROGRESS NOTES
UP: Yes  Activity Tolerance  Activity Tolerance: Patient Tolerated treatment well  Safety Devices  Safety Devices in place: Yes  Type of devices: All fall risk precautions in place;Nurse notified;Call light within reach; Left in chair;Chair alarm in place; Patient at risk for falls  Restraints  Initially in place: No           Patient Diagnosis(es): The primary encounter diagnosis was Syncope and collapse. A diagnosis of Anticoagulated on Coumadin was also pertinent to this visit. has a past medical history of Arthritis, Brain aneurysm, Dementia ([address] Enlarged prostate, Essential hypertension, Foot pain, History of intracranial hemorrhage, Overactive bladder, Pituitary mass ([address] and Right leg DVT ([address] has a past surgical history that includes Appendectomy () and Mouth surgery (). Treatment Diagnosis: syncope and collapse      Restrictions  Restrictions/Precautions  Restrictions/Precautions: Fall Risk(high fall risk, low Na diet)  Position Activity Restriction  Other position/activity restrictions: [facility] is a 78 y.[facility] male who presents to the emergency department today with his daughter for evaluation for a syncopal episode. internal medicine reports syncopal episode was likely from dehydration.  orthostatics negative this AM per RN    Subjective   General  Chart Reviewed: Yes  Family / Caregiver Present: No  Diagnosis: syncope and collapse  Subjective  Subjective: pt in the recliner upon arrival and agreeable to OT eval, denies pain  Patient Currently in Pain: No  Pain Assessment  Pain Assessment: 0-10  Pain Level: 0  Pain Type: Acute pain  Pain Location: Knee  Pain Orientation: Left  Response to Pain Intervention: Patient Satisfied  Vital Signs  Temp: 98.3 °F (36.8 °C)  Temp Source: Oral  Pulse: 57  Heart Rate Source: Monitor  Resp: 16  BP: 130/74  BP Location: Left upper arm  Patient Position: Sitting  Orthostatic B/P and Pulse?: Yes  Blood Pressure Lyin/74  Pulse Lyin PER MINUTE  Blood Pressure Sittin/55  Pulse Sittin PER MINUTE  Blood Pressure Standin/83  Pulse Standin PER MINUTE  Level of Consciousness: Alert (0)  MEWS Score: 1  Patient Currently in Pain: No  Orthostatic B/P and Pulse?: Yes  Oxygen Therapy  SpO2: 96 %  Pulse Oximeter Device Mode: Intermittent  Pulse Oximeter Device Location: Finger  O2 Device: None (Room air)  Social/Functional History  Social/Functional History  Lives With: Daughter(granddtg)  Type of Home: House  Home Layout: Two level(bilevel, pt on top level)  Home Access: Stairs to enter with rails  Entrance Stairs - Number of Steps: unsure of # and which side railing is one  Bathroom Shower/Tub: Tub/Shower unit  Bathroom Toilet: Standard  Home Equipment: [employer], 4 wheeled walker  ADL Assistance: Independent  Homemaking Assistance: Needs assistance  Homemaking Responsibilities: No  Ambulation Assistance: Independent(with SPC at times)  Transfer Assistance: Independent  Active : No  Leisure & Hobbies: none mentioned  Additional Comments: 1 recent fall resulting in hospital admit, dtg home during the day most of the time       Objective        Orientation  Overall Orientation Status: Within Functional Limits  Observation/Palpation  Posture: Fair  Balance  Sitting Balance: Supervision  Standing Balance: Stand by assistance  Standing Balance  Time: ~10 minutes total  Activity: mobility around bed ~10 ft x 2, cane used initially progressing to use of RW. pt educated to use RW upon d/c. agreeable to home health services  Comment: CGA with cane, RW with SBA  Functional Mobility  Functional - Mobility Device: Rolling Walker(cane progressing to use of rw)  Activity: Other  Assist Level: Contact guard assistance  Functional Mobility Comments: cane with CGA, SBA with RW  ADL  Grooming: Setup; Increased time to complete  UE Dressing: Setup; Increased time to complete(donning undershirt and shirt)  LE Dressing: Setup; Increased time to complete(donning pants and shoes)  Toileting: Setup(external catheter removed, pt donning brief and boxers)  Additional Comments: pt seated in recliner to don clothing, increased time needed for task  Tone RUE  RUE Tone: Normotonic  Tone LUE  LUE Tone: Normotonic  Coordination  Movements Are Fluid And Coordinated: No  Coordination and Movement description: Right UE;Left UE;Decreased speed     Bed mobility  Comment: not observed  Transfers  Sit to stand: Stand by assistance  Stand to sit: Stand by assistance     Cognition  Overall Cognitive Status: WFL  Cognition Comment: increased time for processing  Perception  Overall Perceptual Status: WFL     Sensation  Overall Sensation Status: WFL        LUE AROM (degrees)  LUE AROM : WFL  RUE AROM (degrees)  RUE AROM : WFL  LUE Strength  Gross LUE Strength: WFL  RUE Strength  Gross RUE Strength: WFL                   Plan   Plan  Times per week: 1-2  Times per day: Daily  Current Treatment Recommendations: Strengthening, Balance Training, Functional Mobility Training, Self-Care / ADL      AM-PAC Score        AM-[facility] Inpatient Daily Activity Raw Score: 19 (04/01/21 1117)  AM-PAC Inpatient ADL T-Scale Score : 40.22 (04/01/21 1117)  ADL Inpatient CMS 0-100% Score: 42.8 (04/01/21 1117)  ADL Inpatient CMS G-Code Modifier : CK (04/01/21 1117)    Goals  Short term goals  Time Frame for Short term goals: discharge  Short term goal 1: bed mobility mod I  Short term goal 2: functional mobility with LRAD mod I  Short term goal 3: functional ADL transfer mod I  Short term goal 4: UB/LB ADLs mod I       Therapy Time   Individual Concurrent Group Co-treatment   Time In 1030         Time Out 1100         Minutes 30           Timed Code Treatment Minutes:  15 Minutes    Total Treatment Minutes:  30 minutes      Jayy Cheung OTR/L [id number]      Jayy Cheung OT

## 2021-04-01 NOTE — CARE COORDINATION
Discharge Note:    Orders for [address] [facility] walker sent to PCP @ [address]:    Dr. Anderson Monte - Fax # [phone]. Spoke with Triage Nurse Lee Ann Larose and they will take care of setting things up. No further discharge planning needs.     Lebron Abdalla RN BSN  Case Management  [phone]

## 2021-04-01 NOTE — DISCHARGE SUMMARY
Hospital Medicine Discharge Summary    Patient: Katie Pizarro     Gender: male  : 1940   Age: 78 y.o. MRN: [id number]    Admitting Physician: Kelby Patel MD  Discharge Physician: Lee Lares MD     Code Status: Full Code     Admit Date: 3/30/2021   Discharge Date:   2021    Disposition:  home  Time spent arranging discharge: 35 minutes    Discharge Diagnoses: Active Hospital Problems    Diagnosis Date Noted    Syncope and collapse [R55] 2021         Condition at Discharge:  Stable    Hospital Course:   Patient was admitted to our hospital with syncope and collapse. Secondary to orthostatic hypotension likely secondary to dehydration. Patient started IV fluids and lightheadedness and dizziness resolved. Patient echocardiogram which was unremarkable had no events on telemetry. Patient does have a history of pituitary mass is followed up with  neurology. Discharge Exam:    /65   Pulse 65   Temp 98.3 °F (36.8 °C) (Oral)   Resp 16   Ht 5' 4\" (1.626 m)   Wt 153 lb 14.4 oz (69.8 kg)   SpO2 97%   BMI 26.42 kg/m²   General appearance:  Appears comfortable. AAOx3  HEENT: atraumatic, Pupils equal, muscous membranes moist, no masses appreciated  Cardiovascular: Regular rate and rhythm no murmurs appreciated  Respiratory: CTAB no wheezing  Gastrointestinal: Abdomen soft, non-tender, BS+  EXT: no edema  Neurology: no gross focal deficts  Psychiatry: Appropriate affect.  Not agitated  Skin: Warm, dry, no rashes appreciated    Discharge Medications:   Current Discharge Medication List        Current Discharge Medication List        Current Discharge Medication List      CONTINUE these medications which have NOT CHANGED    Details   vitamin D (CHOLECALCIFEROL) 25 MCG (1000 UT) TABS tablet Take 1,000 Units by mouth daily      oxybutynin (DITROPAN-XL) 5 MG extended release tablet Take 5 mg by mouth daily      tamsulosin (FLOMAX) 0.4 MG capsule Take 0.4 mg by mouth nightly      rosuvastatin (CRESTOR) 20 MG tablet Take 10 mg by mouth daily       fluticasone (FLONASE) 50 MCG/ACT nasal spray 1 spray by Nasal route daily      dilTIAZem (TIAZAC) 360 MG extended release capsule Take 360 mg by mouth daily      apixaban (ELIQUIS) 5 MG TABS tablet Take 5 mg by mouth 2 times daily           Current Discharge Medication List          Labs: For convenience and continuity at follow-up the following most recent labs are provided:    Lab Results   Component Value Date    WBC 5.7 04/01/2021    HGB 12.6 04/01/2021    HCT 37.4 04/01/2021    MCV 94.5 04/01/2021     04/01/2021     04/01/2021    K 4.1 04/01/2021     04/01/2021    CO2 25 04/01/2021    BUN 16 04/01/2021    CREATININE 1.3 04/01/2021    CALCIUM 9.0 04/01/2021    PHOS 2.5 04/01/2021    ALKPHOS 87 04/01/2021    ALT 16 04/01/2021    AST 16 04/01/2021    BILITOT 0.5 04/01/2021    LABALBU 3.3 04/01/2021     Lab Results   Component Value Date    INR 1.66 (H) 04/01/2021    INR 1.99 (H) 03/31/2021    INR 1.86 (H) 03/30/2021       Radiology:  Echo Complete 2d W Doppler W Color    Result Date: 3/31/2021  Transthoracic Echocardiography Report (TTE)  Demographics   Patient Name       Sharri Pond   Date of Study      03/31/2021         Gender              Male   Patient Number     [id number]         Date of Birth       1940   Visit Number       [id number]          Age                 78 year(s)   Accession Number   [id number]         Room Number         0942   Corporate ID       [id number]           Sonographer         Landen Covarrubias RDCS, RVT, BS   Ordering Physician Silvia Gomez MD,                     MD                 Physician           Mariel Mckeon  Procedure Type of Study   TTE procedure:ECHOCARDIOGRAM COMPLETE 2D W DOPPLER W COLOR.   Procedure Date Date: 03/31/2021 Start: 01:38 PM Study Location: [facility] [place] [employer] Quality: Adequate visualization Indications:Syncope. Patient Status: Routine Height: 64 inches Weight: 150 pounds BSA: 1.73 m2 BMI: 25.75 kg/m2 HR: 63 bpm BP: 131/73 mmHg  Conclusions   Summary  Normal left ventricle size, wall thickness, and systolic function with an  estimated ejection fraction of 60-65%. No regional wall motion abnormalities are seen. Normal diastolic filling pattern for age. Mild to Moderate aortic regurgitation. Mild tricuspid regurgitation. Signature   ------------------------------------------------------------------  Electronically signed by Rigoberto Cook MD, [facility], [id number]  (Interpreting physician) on 03/31/2021 at 02:36 PM  ------------------------------------------------------------------   Findings   Left Ventricle  Normal left ventricle size, wall thickness, and systolic function with an  estimated ejection fraction of 60-65%. No regional wall motion abnormalities are seen. Average E/e': 7.7. Normal diastolic filling pattern for age. Mitral Valve  The mitral valve is normal in structure and function. Trivial mitral regurgitation. Left Atrium  The left atrium is normal in size. Aortic Valve  The aortic valve is moderately calcified but opens well with normal  gradients. Tricuspid aortic valve. Mild to Moderate aortic regurgitation. Aorta  The aortic root is normal in size. The ascending aorta is normal in size. Right Ventricle  The right ventricle is normal in size and function. TAPSE: 2.0 cm. RV s' velocity: 13.1 cm/s. Tricuspid Valve  Mild tricuspid regurgitation with a PASP of 28 mmHg. Right Atrium  The right atrial size is normal.   Pulmonic Valve  The pulmonic valve is not well visualized. Trivial pulmonic regurgitation. Pericardial Effusion  No pericardial effusion noted. Pleural Effusion  No pleural effusion. Miscellaneous  The inferior vena cava appears normal in size with normal respiratory  variation.   M-Mode/2D consistent with atrophy. There is a lobulated somewhat bilobed shaped mass centered in the region of the sella turcica. The sellar portion of the mass results in posterior displacement/partial erosion of the posterior margin of the sella. There is also mild superior extension of the lesion into the region of the suprasellar cistern. There is extension of portion of the lesion into the region of the left cavernous sinus. In addition, there is loss of portion of the floor of the anterior aspect of the sella with extension of portion of the lesion into the left sphenoid locule. In sagittal plane, the mass measures 3 cm in AP dimension x approximately 1.6 cm in maximum height. 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 No abnormal extra-axial fluid collection is identified. Atherosclerotic calcification of distal internal carotid and vertebral arteries is identified. ORBITS: The visualized portion of the orbits demonstrate no acute abnormality. SINUSES: 1 cm sharply marginated rounded lesion identified within the medial right frontal sinus (image 11). Finding may represent mucous retention cyst or polyp. There is minimal mucosal thickening within a few ethmoid air cells. Minimal mucosal thickening with suggestion of small air-fluid/mucus level identified in the right sphenoid locule. Mastoid air cells are well aerated. SOFT TISSUES/SKULL:  No acute abnormality of the visualized skull or soft tissues. 1. No evidence of acute intracranial abnormality. 2. Findings consistent with presence of lobulated/bilobed pituitary mass centered in the region of the sella turcica with extension into the left sphenoid locule. Short-term follow-up MRI examination with pituitary protocol would be helpful for more complete evaluation.      Ct Cervical Spine Wo Contrast    Result Date: 3/30/2021  EXAMINATION: CT OF THE

## 2021-07-17 PROBLEM — N17.9 AKI (ACUTE KIDNEY INJURY) (HCC): Status: ACTIVE | Noted: 2021-01-01

## 2021-07-17 NOTE — ED PROVIDER NOTES
[facility] Emergency Department      Pt Name: Mary Ryan  MRN: [id number]  [place] 1940  Date of evaluation: 7/17/2021  Provider: Perry Cage MD  I independently performed a history and physical on Mary Ryan. All diagnostic, treatment, and disposition decisions were made by myself in conjunction with the advanced practice provider. HPI: Mary Ryan presented with   Chief Complaint   Patient presents with   Everlene Kalen Fall     pt brought in by [facility] squ from home after passing out at home. pt with hx of low BP and dementia. pt alert and oriented on arrival to department. Mary Ryan has a past medical history of Arthritis, Brain aneurysm, Dementia ([facility] [address] Enlarged prostate, Essential hypertension, Foot pain (03/30/2021), History of intracranial hemorrhage, Overactive bladder, Pituitary mass ([facility] [address] and Right leg DVT ([facility] [address] He has a past surgical history that includes Appendectomy (1959) and Mouth surgery (2020). No current facility-administered medications on file prior to encounter.      Current Outpatient Medications on File Prior to Encounter   Medication Sig Dispense Refill    trospium (SANCTURA) 20 MG tablet Take 20 mg by mouth 2 times daily      vitamin D (CHOLECALCIFEROL) 25 MCG (1000 UT) TABS tablet Take 1,000 Units by mouth daily      oxybutynin (DITROPAN-XL) 5 MG extended release tablet Take 5 mg by mouth daily      tamsulosin (FLOMAX) 0.4 MG capsule Take 0.4 mg by mouth nightly      rosuvastatin (CRESTOR) 20 MG tablet Take 10 mg by mouth daily       dilTIAZem (TIAZAC) 360 MG extended release capsule Take 360 mg by mouth daily      apixaban (ELIQUIS) 5 MG TABS tablet Take 5 mg by mouth 2 times daily      fluticasone (FLONASE) 50 MCG/ACT nasal spray 1 spray by Nasal route daily       PHYSICAL EXAM  Vitals: BP (!) 144/77   Pulse 73   Resp 18   SpO2 98%   Constitutional:  78 y.o. male alert, cooperative  HENT:  sts to forehead, abrasion to nose, mucous membranes moist  Eyes:   Conjunctiva clear, no icterus  Neck:  Supple, no visible JVD, no signs of injury  Cardiovascular:  Regular, no rubs  Thorax & Lungs:  No accessory muscle usage, clear  Abdomen:  Soft, non distended, NT  Back:  No deformity  Genitalia:  Deferred  Rectal:  Deferred  Extremities:  No cyanosis, no edema, adequate perfusion  Skin:  Warm, dry  Neurologic:  Alert, no slurred speech  Psychiatric:  Affect appropriate    Medical Decision Making and Plan:  Briefly, this is an 78 y.o.male who presented with injury from a syncopal episode and fall onto his face. Family reports poor oral intake. Has elevation of creatinine level. Soft tissue injury to face. Brain mass has been known since 2017, pt does not have any known cancer. I discussed the case in full with the admitting physician. For further details of [facility] Emergency Department encounter, please see documentation by advanced practice provider MIRACLE Ramon.      Labs Reviewed   COMPREHENSIVE METABOLIC PANEL W/ REFLEX TO MG FOR LOW K - Abnormal; Notable for the following components:       Result Value    Sodium 135 (*)     Glucose 106 (*)     CREATININE 1.7 (*)     GFR Non- 39 (*)     GFR  52 (*)     All other components within normal limits    Narrative:     Performed at:  [facility] [address], [place], [address]   Phone [phone]   CBC WITH AUTO DIFFERENTIAL    Narrative:     Performed at:  [facility] [address], [place], [facility] [address]   Phone [phone]   TROPONIN    Narrative:     Performed at:  [facility] [address],  [place], [facility] [address]   Phone [phone]   MAGNESIUM    Narrative:     Performed at:  [facility] [address] [facility] [employer]   Phone [phone]   URINE RT REFLEX TO CULTURE     Previous BUN/crea:    BUN Date/Time Value Ref Range Status   07/17/2021 04:48 PM 17 7 - 20 mg/dL Final   04/01/2021 04:57 AM 16 7 - 20 mg/dL Final   03/31/2021 04:15 AM 17 7 - 20 mg/dL Final     CREATININE   Date/Time Value Ref Range Status   07/17/2021 04:48 PM 1.7 (H) 0.8 - 1.3 mg/dL Final   04/01/2021 04:57 AM 1.3 0.8 - 1.3 mg/dL Final   03/31/2021 04:15 AM 1.5 (H) 0.8 - 1.3 mg/dL Final     RADIOLOGY:     Plain x-rays were viewed by me:   CT FACIAL BONES WO CONTRAST   Final Result   No acute traumatic injury of the facial bones. CT Cervical Spine WO Contrast   Final Result   No acute abnormality of the cervical spine. Multilevel degenerative disc disease with mild multilevel facet arthropathy. CT Head WO Contrast   Final Result   1. No acute intracranial abnormality. 2. Small left forehead contusion/scalp hematoma. 3. 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 4. Senescent changes. XR CHEST PORTABLE   Final Result   No radiographic evidence of acute cardiopulmonary disease. EKG:  Read by me in the absence of a cardiologist shows: Rhythm, rate 73, first-degree AV block, left bundle branch block, left axis, poor R wave progression, nonspecific ST-T wave abnormality, minimal change when compared to 30 March 2021 in lead V5 and V6    Vitals:    07/17/21 1605 07/17/21 1626 07/17/21 1630 07/17/21 1645   BP: (!) 144/77  132/72 (!) 143/77   Pulse: 73 73 71 69   Resp: 18      Temp: 98.5 °F (36.9 °C)      TempSrc: Oral      SpO2: 98%  99%      Medications administered:  Medications   0.9 % sodium chloride infusion (has no administration in time range)     FINAL IMPRESSION:    1. Syncope and collapse    2. Hematoma of scalp, initial encounter    3. Anticoagulated    4.  Brain mass       DISPOSITION Admitted 07/17/2021 06:43:14 PM       Kelly King MD  07/18/21 1202

## 2021-07-17 NOTE — ED NOTES
Bed: 18  Expected date:   Expected time:   Means of arrival:   Comments:  Medic [address] [facility]  07/17/21 6581

## 2021-07-17 NOTE — H&P
HOSPITALISTS HISTORY AND PHYSICAL    7/17/2021 6:48 PM    Patient Information:  Lucian Hancock is a 78 y.o. male [id number]  PCP:  Janeth Jones MD (Tel: [phone] )    Chief complaint:    Chief Complaint   Patient presents with   [facility] Fall     pt brought in by [facility] squ from home after passing out at home. pt with hx of low BP and dementia. pt alert and oriented on arrival to department. History of Present Illness:  Humaira Puentes is a 78 y.o. male with h/o brain aneurysm, sellar mass, DVT , chronic anticoagulation , HTN, dementia is brought in by family after passing out and sustaining a fall. Lives with his daughter. Who reported pt was walking using his cane when he lost his balance and fell . He hit his head. He has h/o brain aneurysm for that he followed up with neurosurgery at [facility] in 2020. No surgical intervention is recommended at this stage. He is also following up for serial imaging for the pituitary mass . Trauma work up done in the ED as follows   cT facial bones showed small left scalp hematoma . CT head and cervical spine  showed no acute fracture, infarction   Or  hemorrhage . CT head showed chronic sellar mass that is  stable appearing    REVIEW OF SYSTEMS:   Constitutional: Negative for fever,chills or night sweats  ENT: Negative for rhinorrhea, epistaxis, hoarseness, sore throat. Respiratory: Negative for shortness of breath,wheezing  Cardiovascular: Negative for chest pain, palpitations   Gastrointestinal: Negative for nausea, vomiting, diarrhea  Genitourinary: Negative for polyuria, dysuria   Hematologic/Lymphatic: Negative for bleeding tendency, easy bruising  Musculoskeletal: Negative for myalgias and arthralgias  Neurologic: Negative for confusion,dysarthria. Skin: Negative for itching,rash  Psychiatric: Negative for depression,anxiety, agitation.   Endocrine: Negative for polydipsia,polyuria,heat Glorine Lapine intolerance. Past Medical History:   has a past medical history of Arthritis, Brain aneurysm, Dementia ([facility] [address] Enlarged prostate, Essential hypertension, Foot pain, History of intracranial hemorrhage, Overactive bladder, Pituitary mass ([facility] [address] and Right leg DVT ([facility] [address] Past Surgical History:   has a past surgical history that includes Appendectomy (1959) and Mouth surgery (2020). Medications:  No current facility-administered medications on file prior to encounter.      Current Outpatient Medications on File Prior to Encounter   Medication Sig Dispense Refill    trospium (SANCTURA) 20 MG tablet Take 20 mg by mouth 2 times daily      vitamin D (CHOLECALCIFEROL) 25 MCG (1000 UT) TABS tablet Take 1,000 Units by mouth daily      oxybutynin (DITROPAN-XL) 5 MG extended release tablet Take 5 mg by mouth daily      tamsulosin (FLOMAX) 0.4 MG capsule Take 0.4 mg by mouth nightly      rosuvastatin (CRESTOR) 20 MG tablet Take 10 mg by mouth daily       dilTIAZem (TIAZAC) 360 MG extended release capsule Take 360 mg by mouth daily      apixaban (ELIQUIS) 5 MG TABS tablet Take 5 mg by mouth 2 times daily      fluticasone (FLONASE) 50 MCG/ACT nasal spray 1 spray by Nasal route daily       Current Facility-Administered Medications   Medication Dose Route Frequency Provider Last Rate Last Admin    dilTIAZem (CARDIZEM CD) extended release capsule 360 mg  360 mg Oral Daily Sukumar Mcguire MD        oxybutynin (DITROPAN-XL) extended release tablet 5 mg  5 mg Oral Daily Nola Haile MD        rosuvastatin (CRESTOR) tablet 10 mg  10 mg Oral Nightly Sukumar Mcguire MD   10 mg at 07/17/21 2312    tamsulosin (FLOMAX) capsule 0.4 mg  0.4 mg Oral Nightly Nola Haile MD   0.4 mg at 07/17/21 2312    apixaban (ELIQUIS) tablet 5 mg  5 mg Oral BID Sukumar Mcguire MD   5 mg at 07/17/21 2312    sodium chloride flush 0.9 % injection 5-40 mL  5-40 mL Intravenous 2 times per day Sukumar Mcguire MD        sodium chloride flush 0.9 % injection 5-40 mL  5-40 mL Intravenous PRN Jerrod Ford MD        0.9 % sodium chloride infusion  25 mL Intravenous PRN Jerrod Ford MD        ondansetron (ZOFRAN-ODT) disintegrating tablet 4 mg  4 mg Oral Q8H PRN Jerrod Ford MD        Or    [facility]dansetron [facility] injection 4 mg  4 mg Intravenous Q6H PRN Jerrod Ford MD   4 mg at 07/18/21 0209    polyethylene glycol (GLYCOLAX) packet 17 g  17 g Oral Daily PRN Jerrod Ford MD        acetaminophen (TYLENOL) tablet 650 mg  650 mg Oral Q6H PRN Jerrod Ford MD   650 mg at 07/18/21 0045    Or    acetaminophen (TYLENOL) suppository 650 mg  650 mg Rectal Q6H PRN Jerrod Ford MD        0.9 % sodium chloride infusion   Intravenous Continuous Jerrod Ford  mL/hr at 07/17/21 2317 New Bag at 07/17/21 2317    traMADol (ULTRAM) tablet 50 mg  50 mg Oral Q4H PRN Jerrod Ford MD   50 mg at 07/17/21 2312    trospium (SANCTURA) tablet 20 mg  20 mg Oral BID Jerrod Ford MD   20 mg at 07/17/21 2312     Allergies: Allergies   Allergen Reactions    Keflex [Cephalexin] Hives        Social History:   reports that he has never smoked. He has never used smokeless tobacco. He reports that he does not drink alcohol and does not use drugs. Family History:  family history includes Diabetes in his sister. , family history reviewed not pertinent to current admission    Physical Exam:  BP (!) 143/77   Pulse 69   Temp 98.5 °F (36.9 °C) (Oral)   Resp 18   SpO2 99%     General appearance:  Appears comfortable. Well nourished  Eyes: Sclera clear, pupils equal  ENT: Moist mucus membranes, no thrush. Trachea midline. Cardiovascular: Regular rhythm, normal S1, S2. No murmur, gallop, rub. No edema in lower extremities  Respiratory: Clear to auscultation bilaterally, no wheeze, good inspiratory effort  Gastrointestinal: Abdomen soft, non-tender, not distended, normal bowel sounds  Musculoskeletal: No cyanosis in digits, neck supple  Neurology: Cranial nerves grossly intact. Alert and oriented in time, place and person. No speech or motor deficits  Psychiatry: Appropriate affect. Not agitated  Skin: Warm, dry, normal turgor, no rash    Labs:  CBC:   Lab Results   Component Value Date    WBC 6.1 07/17/2021    RBC 4.55 07/17/2021    HGB 14.8 07/17/2021    HCT 42.5 07/17/2021    MCV 93.5 07/17/2021    MCH 32.5 07/17/2021    MCHC 34.7 07/17/2021    RDW 13.5 07/17/2021     07/17/2021    MPV 7.5 07/17/2021     BMP:    Lab Results   Component Value Date     07/17/2021    K 4.3 07/17/2021     07/17/2021    CO2 23 07/17/2021    BUN 17 07/17/2021    CREATININE 1.7 07/17/2021    CALCIUM 10.2 07/17/2021    GFRAA 47 07/17/2021    LABGLOM 39 07/17/2021    GLUCOSE 106 07/17/2021       Chest Xray:   EKG:        Problem List  Active Problems:    NAMRATA (acute kidney injury) ([facility] [address]  Resolved Problems:    * No resolved hospital problems. *        Assessment/Plan: NAMRATA most likely d/t poor oral intake  cr is up to 1.7 baseline is around 1.3  Will cont to hydrate  Repeat levels    Fall and syncopal episode  Will check orthostatic vitals  Last ECHO cardiogram resulted on 03/2021 showed no significant structural valvular stenosis  UA is pending     Brain aneurysm and pituitary tumor   Follows up at [facility] Neurosurgery recommends no surgical intervention given advanced age  Gets serial imaging for tumor follow up  Will get MRI brain   Monitor h/h while on Eliquis      H/o DVT   CT head is neg for acute hemorrhage  Eliquis resumed           Admit as inpatient. I anticipate hospitalization spanning more than two midnights for investigation and treatment of the above medically necessary diagnoses.       Pb Yu MD    7/17/2021 6:48 PM

## 2021-07-17 NOTE — ED PROVIDER NOTES
[address]        Pt Name: Bianca Gutiérrez  MRN: [id number]  [place] 1940  Date of evaluation: 7/17/2021  Provider: Brandon Bee PA-C  PCP: Timothy Figueroa MD  Note Started: 4:44 PM EDT        I have seen and evaluated this patient with my supervising physician Prateek Carter, *. CHIEF COMPLAINT       Chief Complaint   Patient presents with    Fall     pt brought in by [facility] from home after passing out at home. pt with hx of low BP and dementia. pt alert and oriented on arrival to department. HISTORY OF PRESENT ILLNESS   (Location, Timing/Onset, Context/Setting, Quality, Duration, Modifying Factors, Severity, Associated Signs and Symptoms)  Note limiting factors. Chief Complaint: Syncope and head injury    Bianca Gutiérrez is a 78 y.o. male who presents to the emergency department after a syncopal episode with head injury. Patient has history of dementia. Presents here with his daughter who he lives with and is his caregiver. Daughter states that he was coming out of his room using his cane when all of a sudden he became wobbly and then fell face forward and had a syncopal episode. He is anticoagulated on Eliquis with history of DVT in his right leg. Has history of a brain aneurysm and pituitary mass. Patient denies any pain at this time. Daughter states he has been eating less over the past few days. Has some abrasions over his face. Denies visual changes, headache, chest pain, shortness breath, abdominal pain, extremity pain, numbness or difficulty moving his extremities. He is alert to person and place but not time. Daughter states this is normal for him with his history of dementia. Nursing Notes were all reviewed and agreed with or any disagreements were addressed in the HPI.     REVIEW OF SYSTEMS    (2-9 systems for level 4, 10 or more for level 5)     Review of Systems    Positives and Pertinent negatives as per HPI. Except as noted above in the ROS, all other systems were reviewed and negative.        PAST MEDICAL HISTORY     Past Medical History:   Diagnosis Date    Arthritis     Brain aneurysm     D[facility] [facility]     Enlarged prostate     Essential hypertension     Foot pain 03/30/2021    complaints of foot pain today    History of intracranial hemorrhage     Overactive bladder     Pituitary mass ([facility] [address] [facility]     Right leg DVT ([facility] [address]          SURGICAL HISTORY     Past Surgical History:   Procedure Laterality Date    APPENDECTOMY  1959    MOUTH SURGERY  2020    molars removed         CURRENTMEDICATIONS       Current Discharge Medication List      CONTINUE these medications which have NOT CHANGED    Details   trospium (SANCTURA) 20 MG tablet Take 20 mg by mouth 2 times daily      vitamin D (CHOLECALCIFEROL) 25 MCG (1000 UT) TABS tablet Take 1,000 Units by mouth daily      oxybutynin (DITROPAN-XL) 5 MG extended release tablet Take 5 mg by mouth daily      tamsulosin (FLOMAX) 0.4 MG capsule Take 0.4 mg by mouth nightly      rosuvastatin (CRESTOR) 20 MG tablet Take 10 mg by mouth daily       dilTIAZem (TIAZAC) 360 MG extended release capsule Take 360 mg by mouth daily      apixaban (ELIQUIS) 5 MG TABS tablet Take 5 mg by mouth 2 times daily      fluticasone (FLONASE) 50 MCG/ACT nasal spray 1 spray by Nasal route daily               ALLERGIES     Keflex [cephalexin]    FAMILYHISTORY       Family History   Problem Relation Age of Onset    Diabetes Sister           SOCIAL HISTORY       Social History     Tobacco Use    Smoking status: Never Smoker    Smokeless tobacco: Never Used   Substance Use Topics    Alcohol use: Never    Drug use: Never       SCREENINGS    [place] Coma Scale  Eye Opening: Spontaneous  Best Verbal Response: Oriented  Best Motor Response: Obeys commands  [place] Coma Scale Score: 15        PHYSICAL EXAM    (up to 7 for level 4, 8 or more for level 5)     ED Triage Vitals [07/17/21 1605]   BP Temp Temp Source Pulse Resp SpO2 Height Weight   (!) 144/77 98.5 °F (36.9 °C) Oral 73 18 98 % -- --       Physical Exam  Vitals and nursing note reviewed. Constitutional:       Appearance: He is well-developed. He is not diaphoretic. HENT:      Head:      Comments: Some abrasion over the nose and just above the upper lip of the face. No crepitus. No entrapment. No raccoon eyes or pelaez sign. There is a small hematoma over the left frontal scalp also. Nose: Nose normal.   Eyes:      General:         Right eye: No discharge. Left eye: No discharge. Extraocular Movements: Extraocular movements intact. Pupils: Pupils are equal, round, and reactive to light. Cardiovascular:      Rate and Rhythm: Normal rate and regular rhythm. Heart sounds: No murmur heard. No friction rub. No gallop. Pulmonary:      Effort: Pulmonary effort is normal. No respiratory distress. Breath sounds: No stridor. No wheezing, rhonchi or rales. Abdominal:      General: Bowel sounds are normal. There is no distension. Palpations: Abdomen is soft. There is no mass. Tenderness: There is no abdominal tenderness. There is no guarding or rebound. Hernia: No hernia is present. Musculoskeletal:         General: No swelling or tenderness. Normal range of motion. Cervical back: Normal range of motion. Comments: Gross full range of motion throughout all 4 extremities. No midline tenderness or step-off in the neck or back. Skin:     General: Skin is warm and dry. Findings: No erythema or rash. Neurological:      Mental Status: He is alert. Mental status is at baseline. Cranial Nerves: No cranial nerve deficit.    Psychiatric:         Behavior: Behavior normal.         DIAGNOSTIC RESULTS   LABS:    Labs Reviewed   COMPREHENSIVE METABOLIC PANEL W/ REFLEX TO MG FOR LOW K - Abnormal; Notable for the following components:       Result Value    Sodium 135 (*) pituitary lesion or metastatic lesion. 4. Senescent changes. XR CHEST PORTABLE   Final Result   No radiographic evidence of acute cardiopulmonary disease.                   PROCEDURES   Unless otherwise noted below, none     Procedures    CRITICAL CARE TIME   N/A    CONSULTS:  None      EMERGENCY DEPARTMENT COURSE and DIFFERENTIAL DIAGNOSIS/MDM:   Vitals:    Vitals:    07/17/21 2015 07/17/21 2122 07/17/21 2124 07/17/21 2130   BP:    (!) 150/75   Pulse: 72   73   Resp:  17  18   Temp:    98.4 °F (36.9 °C)   TempSrc:    Oral   SpO2: 100% 95%  97%   Weight:   141 lb 1.5 oz (64 kg)    Height:    5' 4\" (1.626 m)       Patient was given the following medications:  Medications   dilTIAZem [facility]) extended release capsule 360 mg (has no administration in time range)   oxybutynin (DITROPAN-XL) extended release tablet 5 mg (has no administration in time range)   rosuvastatin (CRESTOR) tablet 10 mg (has no administration in time range)   tamsulosin (FLOMAX) capsule 0.4 mg (has no administration in time range)   trospium (SANCTURA) tablet 20 mg (has no administration in time range)   apixaban (ELIQUIS) tablet 5 mg (has no administration in time range)   sodium chloride flush 0.9 % injection 5-40 mL (has no administration in time range)   sodium chloride flush 0.9 % injection 5-40 mL (has no administration in time range)   0.9 % sodium chloride infusion (has no administration in time range)   enoxaparin (LOVENOX) injection 40 mg (has no administration in time range)   ondansetron (ZOFRAN-ODT) disintegrating tablet 4 mg (has no administration in time range)     Or   ondansetron (ZOFRAN) injection 4 mg (has no administration in time range)   polyethylene glycol (GLYCOLAX) packet 17 g (has no administration in time range)   acetaminophen (TYLENOL) tablet 650 mg (has no administration in time range)     Or   acetaminophen (TYLENOL) suppository 650 mg (has no administration in time range)   0.9 % sodium chloride infusion (has no administration in time range)   traMADol (ULTRAM) tablet 50 mg (has no administration in time range)   0.9 % sodium chloride infusion ( Intravenous New Bag 7/17/21 2026)           Patient presented after syncopal episode. Has mildly elevated creatinine of 1.7. Has some abrasions over the face. CT imaging and remainder work-up is unremarkable. Given his age and history of dementia and brain mass patient will be admitted for further work-up and treatment. Low suspicion for CVA, TIA, cord injury, vertebral fracture or other emergent etiology. Patient was stable time of admission. FINAL IMPRESSION      1. Syncope and collapse    2. Hematoma of scalp, initial encounter    3. Anticoagulated    4. Brain mass    5. Elevated serum creatinine          DISPOSITION/PLAN   DISPOSITION Admitted 07/17/2021 06:43:14 PM      PATIENT REFERRED TO:  No follow-up provider specified.     DISCHARGE MEDICATIONS:  Current Discharge Medication List          DISCONTINUED MEDICATIONS:  Current Discharge Medication List                 (Please note that portions of this note were completed with a voice recognition program.  Efforts were made to edit the dictations but occasionally words are mis-transcribed.)    Anh Saunders PA-C (electronically signed)           Anh Saunders PA-C  07/17/21 [phone]

## 2021-07-17 NOTE — ED NOTES
Daughter at bedside. States pt c/o legs wouldn't hold him up and that his legs felt weak before falling.      Marcus Medina RN  07/17/21 1542

## 2021-07-18 NOTE — CONSULTS
[facility]   Electrophysiology Consultation   Date: 7/18/2021  Reason for Consultation: Syncope  Consult Requesting Physician: Nishi Mendoza MD     Chief Complaint   Patient presents with    Fall     pt brought in by [facility] from home after passing out at home. pt with hx of low BP and dementia. pt alert and oriented on arrival to department. HPI: Katherin Dominguez is a 78 y.o. male with history of brain aneurysm, supplements, DVT right leg on chronic anticoagulation with Eliquis, hypertension, dementia was brought in due to passing out and sustaining a fall. He was walking using his cane when he lost balance and fell. According to his daughter he was coming out of his room using his cane and all of a sudden became wobbly and then fell face forward. He follows up at [facility] for brain aneurysm with neurosurgery. No surgical intervention was planned. Past Medical History:   Diagnosis Date    Arthritis     Brain aneurysm     Dementia ([address]     Enlarged prostate     Essential hypertension     Foot pain 03/30/2021    complaints of foot pain today    History of intracranial hemorrhage     Overactive bladder     Pituitary mass (H[facility])     Right leg DVT (H[facility])         Past Surgical History:   Procedure Laterality Date    APPENDECTOMY  1959    MOUTH SURGERY  2020    molars removed       Allergies   Allergen Reactions    Keflex [Cephalexin] Hives       Social History:  Reviewed. reports that he has never smoked. He has never used smokeless tobacco. He reports that he does not drink alcohol and does not use drugs. Family History:  Reviewed. family history includes Diabetes in his sister. Review of System:  All other systems reviewed and are negative except for that noted above.  Pertinent negatives are:     · General: negative for fever, chills   · Ophthalmic ROS: negative for - eye pain or loss of vision  · ENT ROS: negative for - headaches, sore throat   · Respiratory: negative for - cough, sputum  · Cardiovascular: Reviewed in HPI  · Gastrointestinal: negative for - abdominal pain, diarrhea, N/V  · Hematology: negative for - bleeding, blood clots, bruising or jaundice  · Genito-Urinary:  negative for - Dysuria or incontinence  · Musculoskeletal: negative for - Joint swelling, muscle pain  · Neurological: negative for - confusion, dizziness, headaches   · Psychiatric: No anxiety, no depression. · Dermatological: negative for - rash    Physical Examination:  Vitals:    21 1329   BP:    Pulse: 86   Resp:    Temp:    SpO2:       In: 1044.4 [P.O.:240; I.V.:804.4]  Out: 600    Wt Readings from Last 3 Encounters:   21 141 lb 1.5 oz (64 kg)   21 153 lb 14.4 oz (69.8 kg)     Temp  Av.3 °F (36.8 °C)  Min: 97.8 °F (36.6 °C)  Max: 98.5 °F (36.9 °C)  Pulse  Av.1  Min: 64  Max: 86  BP  Min: 132/71  Max: 159/76  SpO2  Av.4 %  Min: 95 %  Max: 100 %    Intake/Output Summary (Last 24 hours) at 2021 1445  Last data filed at 2021 1335  Gross per 24 hour   Intake 1044.37 ml   Output 600 ml   Net 444.37 ml       · Telemetry: Sinus rhythm, first-degree AV block, 2-1 intermittent block some of which look like Mobitz type II  · Constitutional: Oriented x2. No distress. · Head: Normocephalic and with some bruising in the front of his face  · Mouth/Throat: Oropharynx is clear and moist.   · Eyes: Conjunctivae normal. EOM are normal.   · Neck: Neck supple. No rigidity. No JVD present. · Cardiovascular: Normal rate, regular rhythm, S1&S2. · Pulmonary/Chest: Bilateral respiratory sounds. No wheezes, No rhonchi. · Abdominal: Soft. Bowel sounds present. No distension, No tenderness. · Musculoskeletal: No tenderness. No edema    · Lymphadenopathy: Has no cervical adenopathy. · Neurological: Alert and oriented. Cranial nerve appears intact, No Gross deficit   · Skin: Skin is warm and dry. No rash noted.   Bruising on the face  · Psychiatric: Has a normal behavior Labs, diagnostic and imaging results reviewed. Reviewed. Recent Labs     07/17/21  1648 07/18/21  0601   * 133*   K 4.3 4.3    100   CO2 23 21   BUN 17 15   CREATININE 1.7* 1.4*     Recent Labs     07/17/21  1648 07/18/21  0601   WBC 6.1 7.2   HGB 14.8 13.6   HCT 42.5 39.7*   MCV 93.5 92.7    178     Lab Results   Component Value Date    TROPONINI <0.01 07/18/2021     No results found for: BNP  Lab Results   Component Value Date    PROTIME 19.4 04/01/2021    PROTIME 23.3 03/31/2021    PROTIME 21.7 03/30/2021    INR 1.66 04/01/2021    INR 1.99 03/31/2021    INR 1.86 03/30/2021     No results found for: CHOL, HDL, TRIG    ECG: Sinus with first-degree AV block and left bundle branch block  Echo: 3/30/2021  Conclusions      Summary   Normal left ventricle size, wall thickness, and systolic function with an   estimated ejection fraction of 60-65%. No regional wall motion abnormalities are seen. Normal diastolic filling pattern for age. Mild to Moderate aortic regurgitation. Mild tricuspid regurgitation. Cath:   CT    Impression   1. No acute intracranial abnormality. 2. Small left forehead contusion/scalp hematoma. 3. 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 4. Senescent changes.        Scheduled Meds:   dilTIAZem  360 mg Oral Daily    oxybutynin  5 mg Oral Daily    rosuvastatin  10 mg Oral Nightly    tamsulosin  0.4 mg Oral Nightly    apixaban  5 mg Oral BID    sodium chloride flush  5-40 mL Intravenous 2 times per day    trospium  20 mg Oral BID     Continuous Infusions:   sodium chloride       PRN Meds:.sodium chloride flush, sodium chloride, ondansetron **OR** ondansetron, polyethylene glycol, acetaminophen **OR** acetaminophen, traMADol     Patient Active Problem List    Diagnosis Date Noted    NAMRATA (acute kidney injury) ([facility] [address] 07/17/2021    Syncope and collapse 03/30/2021      Active Hospital Problems    Diagnosis Date Noted    NAMRATA (acute kidney injury) ([facility] [address] [N17.9] 07/17/2021       Assessment:       Plan:    -Syncope, left bundle branch block, Mobitz type II    I talked to his daughter and got a history over the phone. I reviewed his EKG and telemetry. He has episodes of 2-1 block that suddenly happened. They seem to be Mobitz type II at times. They are not rate related. The etiology of syncope can be due to dehydration, imbalance, orthostatic hypotension, or AV block. Given the findings on EKG and telemetry I think it is likely that one of the etiologies is the intermittent second-degree AV block and therefore he has an indication for pacemaker. I discussed this with him and his daughter and they are agreeable. The risks, benefits and alternatives of the procedure were discussed with the patient. The risks including, but not limited to, the risks of bleeding, infection, pain, device malfunction, lead dislodgement, radiation exposure, injury to cardiac and surrounding structures (including pneumothorax), stroke, cardiac perforation, tamponade, need for emergent heart surgery, myocardial infarction and death were discussed in detail. Dual vs single chamber device, including risks and benefits were discussed with patient. Printed information about device implantation including risks were given. Patient was encouraged to review information given, and call back with any questions about risks, benefits and alternative of procedure. The patient opted to proceed with the device implantation. -NAMRATA on CKD     Continue hydration. Creatinine has improved. -History of DVT    Continue Eliquis.    -Hyponatremia    Adequate intake and hydration with saline. Thank you for allowing me to participate in the care of Tali Kishor     NOTE: This report was transcribed using voice recognition software.  Every effort was made to ensure accuracy, however, inadvertent computerized transcription errors may be present.

## 2021-07-18 NOTE — PROGRESS NOTES
Pt admitted to room 5573. Pt is alert and oriented x3. Pt reports ambulates independently with a cane at home. Admission documentation and head to toe assessments completed. 4 eyes assessment completed, no noted wounds. Pt has abrasions on bridge of nose and right nasal fold, contusion noted on Left forehead. Warm blanket wrap and pain medications given for headache per pt request, pt refuses ice compress. Daughter notified of admission and plan of care through phone, home medication list completed with daughter. Meds passed per [facility]. Pt resting in bed. Fall precautions in place, bed alarm on, nonskid foot wear applied, bed in lowest position, and call light within reach. IVF infusing. Will continue to monitor.

## 2021-07-18 NOTE — PLAN OF CARE
Problem: Falls - Risk of:  Goal: Will remain free from falls  Description: Will remain free from falls  7/18/2021 1058 by Luiz Guardado RN  Outcome: Ongoing  Note: Fall precaution in place. Bed alarm on, call light within reach. Bedside table within reach. Pt educated on importance of using call light. Will monitor. Problem: Pain:  Goal: Pain level will decrease  Description: Pain level will decrease  7/18/2021 1058 by Luiz Guardado RN  Outcome: Ongoing  Note: Pt c/o of on-going headache. Prn pain medication given. No change as of now, will continue to monitor. 7/18/2021 0111 by Tayla Cleary RN  Outcome: Ongoing  Note: Tramadol and tylenol given for headache per [facility]. Light reduced. HOB slightly elevated 30 degrees. Warm blanket wrapped around head per pt request. Pt refuses ice compress. Will continue to monitor.

## 2021-07-18 NOTE — ED NOTES
Report called to 5T RN. No further questions at this time. Transport requested for pt to inpatient room. IV fluids continued to inpatient status. Belongings sent with pt.       Rima Barfield RN  07/17/21 2038

## 2021-07-18 NOTE — PROGRESS NOTES
4 Eyes Skin Assessment     NAME:  Kenney Montero OF BIRTH:  1940  MEDICAL RECORD NUMBER:  [id number]    The patient is being assess for  Admission    I agree that 2 RN's have performed a thorough Head to Toe Skin Assessment on the patient. ALL assessment sites listed below have been assessed. Areas assessed by both nurses:    Head, Face, Ears, Shoulders, Back, Chest, Arms, Elbows, Hands, Sacrum. Buttock, Coccyx, Ischium and Legs. Feet and Heels        Does the Patient have a Wound?  No noted wound(s)       Eder Prevention initiated:  No   Wound Care Orders initiated:  No    Pressure Injury (Stage 3,4, Unstageable, DTI, NWPT, and Complex wounds) if present place consult order under [de-identified] No    New and Established Ostomies if present place consult order under : No      Nurse 1 eSignature: Electronically signed by Kirk Mullins RN on 7/17/21 at 11:00 PM EDT    **SHARE this note so that the co-signing nurse is able to place an eSignature**    Nurse 2 eSignature: Electronically signed by Lucio Skiff, RN on 7/17/21 at 11:14 PM EDT

## 2021-07-18 NOTE — ED NOTES
Report attempted. Nurse unavailable. To call when ready for report. Will continue to follow.       Rima Barfield RN  07/17/21 2028

## 2021-07-18 NOTE — PROGRESS NOTES
Hospitalist Progress Note      PCP: Sophia Mtz MD    Date of Admission: 7/17/2021    Chief Complaint: Syncope    Hospital Course: This morning patient feels well  No lightheadedness  No acute events on telemetry  No nausea vomiting  No headaches        Medications:  Reviewed      Exam:    BP (!) 159/76   Pulse 78   Temp 98.2 °F (36.8 °C) (Oral)   Resp 17   Ht 5' 4\" (1.626 m)   Wt 141 lb 1.5 oz (64 kg)   SpO2 98%   BMI 24.22 kg/m²     General appearance: No apparent distress, appears stated age and cooperative. HEENT: Pupils equal, round, and reactive to light. Conjunctivae/corneas clear. Bruising on the upper lip bridge of the nose and forehead  Neck: Supple, with full range of motion. No jugular venous distention. Trachea midline. Respiratory:  Normal respiratory effort. Clear to auscultation, bilaterally without RALES/WHEEZES/Rhonchi. Cardiovascular: Regular rate and rhythm with normal S1/S2 without MURMURS, rubs or gallops. Abdomen: Soft, non-tender, non-distended with normal bowel sounds. Musculoskeletal: No clubbing, cyanosis or EDEMA bilaterally. Full range of motion without deformity. Skin: Skin color, texture, turgor normal.  No rashes or lesions. Neurologic:  Neurovascularly intact without any focal sensory/motor deficits. Cranial nerves: II-XII intact, grossly non-focal.        Labs:   Recent Labs     07/17/21  1648 07/18/21  0601   WBC 6.1 7.2   HGB 14.8 13.6   HCT 42.5 39.7*    178     Recent Labs     07/17/21  1648 07/18/21  0601   * 133*   K 4.3 4.3    100   CO2 23 21   BUN 17 15   CREATININE 1.7* 1.4*   CALCIUM 10.2 9.5     Recent Labs     07/17/21  1648   AST 21   ALT 13   BILITOT 0.6   ALKPHOS 96     No results for input(s): INR in the last 72 hours.   Recent Labs     07/17/21  1648   TROPONINI <0.01       Urinalysis:      Lab Results   Component Value Date    NITRU Negative 03/31/2021    BLOODU Negative 03/31/2021    SPECGRAV 1.012 03/31/2021 GLUCOSEU 100 03/31/2021       Radiology:  CT FACIAL BONES WO CONTRAST   Final Result   No acute traumatic injury of the facial bones. CT Cervical Spine WO Contrast   Final Result   No acute abnormality of the cervical spine. Multilevel degenerative disc disease with mild multilevel facet arthropathy. CT Head WO Contrast   Final Result   1. No acute intracranial abnormality. 2. Small left forehead contusion/scalp hematoma. 3. 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 4. Senescent changes. XR CHEST PORTABLE   Final Result   No radiographic evidence of acute cardiopulmonary disease. MRI BRAIN WO CONTRAST    (Results Pending)       Assessment/Plan:    Active Hospital Problems    Diagnosis Date Noted    NAMRATA (acute kidney injury) ([facility] [address] [N17.9] 07/17/2021       Acute Medical Issues Being Addressed:    49-year-old admitted to the hospital with syncope    Syncope probably secondary to dehydration  No EKG available in the system we will repeat 1  Troponin was negative  Had an echocardiogram recently in March which showed normal ejection fraction with mild/moderate aortic regurgitation  Currently on telemetry is  So far no events  We will get orthostatics although he has been given IV fluids already I suspect the NST negative    Acute kidney injury prerenal on baseline of chronic kidney disease stage III  Creatinine 1.7 on admission improved with IV fluids back to baseline of around 1.3    Previous DVT  On chronic anticoagulation    Previous brain aneurysm and suprasellar mass  Followed up with   CT head shows unchanged  MRI of the brain has been ordered    Will not repeat echocardiogram  We will review EKG if that looks unchanged from previous EKG then will get PT OT and possible discharge tomorrow          DVT Prophylaxis: On Eliquis  Diet: ADULT DIET;  Regular  Code Status: Full Code      Dispo - once acute medical processes have resolved    Nishi Mendoza MD

## 2021-07-18 NOTE — PLAN OF CARE
Problem: Falls - Risk of:  Goal: Will remain free from falls  Description: Will remain free from falls  Outcome: Ongoing  Note: Fall precautions in place, bed alarm on, nonskid foot wear applied, bed in lowest position, and call light within reach. Will continue to monitor. Problem: Pain:  Description: Pain management should include both nonpharmacologic and pharmacologic interventions. Goal: Pain level will decrease  Description: Pain level will decrease  Outcome: Ongoing  Note: Tramadol and tylenol given for headache per MAR. Light reduced. HOB slightly elevated 30 degrees. Warm blanket wrapped around head per pt request. Pt refuses ice compress. Will continue to monitor. Problem: Fluid Volume:  Goal: Ability to achieve a balanced intake and output will improve  Description: Ability to achieve a balanced intake and output will improve  Outcome: Ongoing  Note: VSS. IVF infusing. Pt able to void. Daily weight. Recheck creatinine and electrolytes in am.   Will continue to monitor.      Problem: Physical Regulation:  Goal: Will show no signs and symptoms of electrolyte imbalance  Description: Will show no signs and symptoms of electrolyte imbalance  Outcome: Ongoing

## 2021-07-18 NOTE — PROGRESS NOTES
Shift assessment done. Pt A&O. VSS on RA. Scheduled medication given. Pt c/o headache 8/10, pain medication given. No current needs.  Will continue to monitor

## 2021-07-19 NOTE — PROGRESS NOTES
[address]   Electrophysiology Progress Note   Date: 7/19/2021  Admit Date: 7/17/2021     Reason for follow up: Symptomatic Bradycardia    Chief Complaint:   Chief Complaint   Patient presents with    Fall     pt brought in by [facility] from home after passing out at home. pt with hx of low BP and dementia. pt alert and oriented on arrival to department. History of Present Illness: History obtained from patient and medical record. Taylor Carter is a 78 y.o. 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 Found to be in Mobitz II on telemetry. 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 Denies pain or swelling to left upper chest. He became unresponsive while working with PT. Recovered once he returned to supine and was able to perform bed mobility. He did not received diltiazem today, unsure if this is home medication or not? No new complaints today. No major events overnight. Denies having chest pain, palpitations, shortness of breath, orthopnea/PND, cough, or dizziness. Patient seen and examined. Clinical notes reviewed. Telemetry reviewed.     Problem List:   Patient Active Problem List    Diagnosis Date Noted    Hyponatremia     Mobitz II     NAMRATA (acute kidney injury) ([facility] [address] 07/17/2021    Syncope and collapse 03/30/2021      Assessment and Plan:  Recurrent Syncope   - Suspect related to AVB   - Monitor symptoms    - S/p PPM   - Recurrent today while working with PT post PPM    ~ Repeat device interrogation revealed no recorded episodes, he was not on telemetry at the time of the event   - Recommend orthostatic BP   - Stop diltiazem, he did not received today, unsure if he was taking PTA  Mobitz II   - S/p dual chamber PPM 7/19/2021    ~ Right upper chest CDI, no swelling, pain or hematoma    ~ Device interrogation and CXR today were normal from EP standpoint  Hypertension   - Resume diltiazem or home medications as tolerated   - Lenient BP control in setting of episode of recurrent syncope  DVT    - On Eliquis    - Resume Eliquis tonight from surgical stadnpoint  - Stop diltiazem  - Orthostatic BP   - Will get stress test    Post-Device Implant   1. Wound Care  -Bathe daily but keep incision dry and clean until your 7-10 day follow up  -Do not shower until you are told to do so by your physician.  -Do not remove the outer dressing unless it is soiled  -Allow the thin pieces of tape (Steri-Strips) to fall off naturally. Do not pick or pull at these unless instructed to do so by your physician. 2. Contact the cardiologists office for these concerns:   -Increased swelling and/or tenderness in incision and/or extending down the arm on the same side as implant site   -Feeling increased palpitations or irregular heart beat   -Redness of incision or drainage from incision.   -Bleeding that does not stop or increases.  -Skin pimples along incision.  -If a suture works its way through the incision.  -Fever greater than 100.5    3. Do not rub or twist the device site    4. Avoid lifting objects heavier than 10 pounds for one month. Do not raise the arm on the side where the device was implanted over your head for one month. These rules help to heal the implant site and stabilize the heart lead wires. 5. Avoid tight clothing over the incision. 6. No driving for one week or until initial visit after your procedure. 7. Carry your temporary Device  I.D. Card with you until your permanent card arrives in four to six weeks. An I. D. Card should be with you always. 8. If you have a defibrillator (AICD) and receive a shock or hear a tone from the device call the doctor's office    9. An implanted device does not replace any medications, diet or activity restrictions that you had before the implant. Check with your cardiologist regarding questions    10. Reasons to seek medical attention immediately: Call 911   -Sudden onset of chest pain, shortness of breath, dizziness, or loss of balance or coordination   -Sudden Change in vision   -Sudden confusion or trouble speaking and/or understanding    -Sudden onset of numbness or weakness of face/arm/leg, especially on one side of the body   -Sudden or severe headache without known cause   -Nausea with uncontrolled vomiting   -Severe bleeding    Activity: Activity as tolerated, no lifting over 10 lbs for 1 week  Diet: cardiac diet  Wound Care: Keep CDI, no lotions/ointments/powders- No baths x 1 week    F/U:   1. Follow-up with EP NP 3 months  2. Follow up with device clinic 1 week  -Call Corey Eldridge at [phone] with any questions    All pertinent information and plan of care discussed with the EP physician. Multiple medical conditions with risk of decompensation. Allergies: Allergies   Allergen Reactions    Keflex [Cephalexin] Hives     Home Meds:  Prior to Visit Medications    Medication Sig Taking?  Authorizing Provider   trospium (SANCTURA) 20 MG tablet Take 20 mg by mouth 2 times daily Yes Historical Provider, MD   vitamin D (CHOLECALCIFEROL) 25 MCG (1000 UT) TABS tablet Take 1,000 Units by mouth daily Yes Historical Provider, MD   oxybutynin (DITROPAN-XL) 5 MG extended release tablet Take 5 mg by mouth daily Yes Historical Provider, MD   tamsulosin (FLOMAX) 0.4 MG capsule Take 0.4 mg by mouth nightly Yes Historical Provider, MD   rosuvastatin (CRESTOR) 20 MG tablet Take 10 mg by mouth daily  Yes Historical Provider, MD   dilTIAZem (TIAZAC) 360 MG extended release capsule Take 360 mg by mouth daily Yes Historical Provider, MD   apixaban (ELIQUIS) 5 MG TABS tablet Take 5 mg by mouth 2 times daily Yes Historical Provider, MD   fluticasone (FLONASE) 50 MCG/ACT nasal spray 1 spray by Nasal route daily  Historical Provider, MD      Scheduled Meds:   dilTIAZem  360 mg Oral Daily    oxybutynin  5 mg Oral Daily  rosuvastatin  10 mg Oral Nightly    tamsulosin  0.4 mg Oral Nightly    apixaban  5 mg Oral BID    sodium chloride flush  5-40 mL Intravenous 2 times per day    trospium  20 mg Oral BID     Continuous Infusions:   sodium chloride       PRN Meds:sodium chloride flush, sodium chloride, ondansetron **OR** ondansetron, polyethylene glycol, acetaminophen **OR** acetaminophen, traMADol   Past Medical History:  Past Medical History:   Diagnosis Date    Arthritis     Brain aneurysm     Dementia ([facility] [address]     Enlarged prostate     Essential hypertension     Foot pain 03/30/2021    complaints of foot pain today    History of intracranial hemorrhage     Overactive bladder     Pituitary mass ([facility] [address]     Right leg DVT (HCC)       Past Surgical History:    has a past surgical history that includes Appendectomy (2774) and Mouth surgery (2020). Social History:  Reviewed. reports that he has never smoked. He has never used smokeless tobacco. He reports that he does not drink alcohol and does not use drugs. Family History:  Reviewed. family history includes Diabetes in his sister. Denies family history of sudden cardiac death, arrhythmia, premature CAD    Review of Systems:  Limited due to baseline dementia, see HPI    Physical Examination:  Vitals:    07/19/21 0813   BP: (!) 164/69   Pulse: 68   Resp: 16   Temp: 98.9 °F (37.2 °C)   SpO2: 97%      In: 20 [I.V.:20]  Out: 300    Wt Readings from Last 3 Encounters:   07/17/21 141 lb 1.5 oz (64 kg)   04/01/21 153 lb 14.4 oz (69.8 kg)       Intake/Output Summary (Last 24 hours) at 7/19/2021 1024  Last data filed at 7/19/2021 0914  Gross per 24 hour   Intake 260 ml   Output 300 ml   Net -40 ml     Telemetry: Personally Reviewed AP  · Constitutional: Cooperative and in no apparent distress, and appears well nourished  · Skin: Warm and pink; no cyanosis, bruising, or clubbing + Incision left upper chest CDI, + facial abrasions  · HEENT: Symmetric and normocephalic. Conjunctiva pink with clear sclera. Mucus membranes pink and moist.   · Cardiovascular: regular rate and rhythm. S1 & S2, negative for murmurs. Peripheral pulses 2+, capillary refill < 3 seconds. negative elevation of JVP. No edema  · Respiratory: Respirations symmetric and unlabored. Lungs clear to auscultation bilaterally, no wheezing, crackles, or rhonchi  · Gastrointestinal: Abdomen soft and round. Bowel sounds normoactive in all quadrants. · Musculoskeletal: No focal weakness. · Neurologic/Psych: Awake and alert. Calm affect, appropriate mood    Pertinent labs, diagnostic, device, and imaging results reviewed as a part of this visit    Labs:    BMP:   Recent Labs     21  0601 21  0532   * 133* 132*   K 4.3 4.3 4.2    100 97*   CO2 23 21 20*   BUN 17 15 13   CREATININE 1.7* 1.4* 1.3   MG 2.20  --  1.90     Estimated Creatinine Clearance: 36 mL/min (based on SCr of 1.3 mg/dL). CBC:   Recent Labs     21  0601 21  0532   WBC 6.1 7.2 7.5   HGB 14.8 13.6 14.3   HCT 42.5 39.7* 40.3*   MCV 93.5 92.7 91.9    178 153     Thyroid: No results found for: TSH, N0EEBZI, Q4JEHRU, THYROIDAB  Lipids: No results found for: CHOL, HDL, TRIG  LFTS:   Lab Results   Component Value Date    ALT 13 2021    AST 21 2021    ALKPHOS 96 2021    PROT 7.7 2021    AGRATIO 1.1 2021    BILITOT 0.6 2021     Cardiac Enzymes:   Lab Results   Component Value Date    TROPONINI <0.01 2021    TROPONINI <0.01 2021    TROPONINI <0.01 2021     Coags:   Lab Results   Component Value Date    PROTIME 19.4 2021    INR 1.66 2021     EC2021: SR 1st degree AVB    ECHO: 3/31/2021    Summary   Normal left ventricle size, wall thickness, and systolic function with an   estimated ejection fraction of 60-65%. No regional wall motion abnormalities are seen. Normal diastolic filling pattern for age.    Mild to Moderate aortic regurgitation. Mild tricuspid regurgitation. Stress Test: none    Cath:none    All questions and concerns were addressed to the patient. Alternatives to my treatment were discussed. I have discussed the above stated plan with patient and the nurse. The patient verbalized understanding and agreed with the plan. Thank you for allowing to us to participate in the care of Humaira Puentes.     DOC Fung  [address]   Office: [phone]

## 2021-07-19 NOTE — PROGRESS NOTES
Physical/Occupational Therapy  [place]    Attempted to see pt for PT/OT evaluation. Chart reviewed. Admitted due to syncopal episode/fall. Discussed with nursing, patient to get pacemaker later this date. Will hold therapy at this time and will follow up with pt as medically appropriate following pacemaker placement.  Thanks, Yusuf Garcia, PT, DPT [id number], Sally Mccarthy, MOT OTR/L [id number]

## 2021-07-19 NOTE — CARE COORDINATION
Discharge Planning Assessment  Discharge Planning Assessment  RN/SW discharge planner met with patient/ (and family member) to discuss reason for admission, current living situation, and potential needs at the time of discharge completed with pt and daughter in room     Demographics/Insurance verified Yes    Current type of dwelling:bi-level home with one step to enter and 8 steps between levels. Patient from ECF/SW confirmed with:    Living arrangements:Daughter lives with pt and states she is caregiver for him    Level of function/Support: independent     PCP: Dr Ashlee De Leon at the [facility]     Last Visit to PCP:    DME:cane, 4 wheeled walker, shower chair     Active with any community resources/agencies/skilled home care:daughter states [facility] [phone] (CM has not yet called to see if skilled or unskilled services)     Daughter would like COA referral made     Medication compliance issues: daughter assists     Financial issues that could impact healthcare: none          Tentative discharge plan: therapy recommends snf     Discussed and provided facilities of choice if transition to a skilled nursing facility is required at the time of discharge daughter and pt reviewing facilities. Pt has been to [facility] and both agree to referral to them. CM will follow up for other choices . Discussed with patient and/or family that on the day of discharge home tentative time of discharge will be between 10 AM and noon.     Transportation at the time of discharge: transportation service if to [address] [place] [address] Street, [address] [facility], BSN  [phone]

## 2021-07-19 NOTE — PROGRESS NOTES
Spoke with daughter Anabel Cage, got verbal consent via phone for OK for Dr [employer] to place pacemaker today.

## 2021-07-19 NOTE — PROGRESS NOTES
[address]   Electrophysiology Progress Note     Admit Date: 2021     Reason for follow up: syncope, Mobitz ll    HPI and Interval History:   Patient seen and examined. Clinical notes reviewed. Telemetry reviewed. No new complaint today. No major events overnight. Denies having chest pain, shortness of breath, dyspnea on exertion, Orthopnea, PND at the time of this visit. Review of System:  All other systems reviewed and are negative except for that noted above. Pertinent negatives are:     · General: negative for fever, chills   · Ophthalmic ROS: negative for - eye pain or loss of vision  · ENT ROS: negative for - headaches, sore throat   · Respiratory: negative for - cough, sputum  · Cardiovascular: Reviewed in HPI  · Gastrointestinal: negative for - abdominal pain, diarrhea, N/V  · Hematology: negative for - bleeding, blood clots, bruising or jaundice  · Genito-Urinary:  negative for - Dysuria or incontinence  · Musculoskeletal: negative for - Joint swelling, muscle pain  · Neurological: negative for - confusion, dizziness, headaches   · Psychiatric: No anxiety, no depression. · Dermatological: negative for - rash      Physical Examination:  Vitals:    21 1620   BP: 100/65   Pulse: 80   Resp: 16   Temp: 98.9 °F (37.2 °C)   SpO2: 96%      In: 20 [I.V.:20]  Out: 700    Wt Readings from Last 3 Encounters:   21 141 lb 1.5 oz (64 kg)   21 153 lb 14.4 oz (69.8 kg)     Temp  Av.9 °F (37.2 °C)  Min: 98.6 °F (37 °C)  Max: 99.1 °F (37.3 °C)  Pulse  Av  Min: 56  Max: 86  BP  Min: 100/65  Max: 164/69  SpO2  Av.7 %  Min: 95 %  Max: 99 %    Intake/Output Summary (Last 24 hours) at 2021 1805  Last data filed at 2021 1344  Gross per 24 hour   Intake 20 ml   Output 700 ml   Net -680 ml       · Telemetry: Sinus rhythm , Mobitz  · Constitutional: Oriented. No distress. · Head: Normocephalic and atraumatic.    · Mouth/Throat: Oropharynx is clear and moist. · Eyes: Conjunctivae normal. EOM are normal.   · Neck: Neck supple. No rigidity. No JVD present. · Cardiovascular: Normal rate, regular rhythm, S1&S2. · Pulmonary/Chest: Bilateral respiratory sounds. No wheezes, No rhonchi. · Abdominal: Soft. Bowel sounds present. No distension, No tenderness. · Musculoskeletal: No tenderness. No edema    · Lymphadenopathy: Has no cervical adenopathy. · Neurological: Alert and oriented. Cranial nerve appears intact, No Gross deficit   · Skin: Skin is warm and dry. No rash noted. · Psychiatric: Has a normal behavior     Labs, diagnostic and imaging results reviewed. Reviewed. Recent Labs     07/17/21 1648 07/18/21  0601 07/19/21  0532   * 133* 132*   K 4.3 4.3 4.2    100 97*   CO2 23 21 20*   BUN 17 15 13   CREATININE 1.7* 1.4* 1.3     Recent Labs     07/17/21 1648 07/18/21  0601 07/19/21  0532   WBC 6.1 7.2 7.5   HGB 14.8 13.6 14.3   HCT 42.5 39.7* 40.3*   MCV 93.5 92.7 91.9    178 153     Lab Results   Component Value Date    TROPONINI <0.01 07/18/2021     Estimated Creatinine Clearance: 36 mL/min (based on SCr of 1.3 mg/dL).    No results found for: BNP  Lab Results   Component Value Date    PROTIME 19.4 04/01/2021    PROTIME 23.3 03/31/2021    PROTIME 21.7 03/30/2021    INR 1.66 04/01/2021    INR 1.99 03/31/2021    INR 1.86 03/30/2021     No results found for: CHOL, HDL, TRIG    Scheduled Meds:   [Held by provider] dilTIAZem  360 mg Oral Daily    oxybutynin  5 mg Oral Daily    rosuvastatin  10 mg Oral Nightly    tamsulosin  0.4 mg Oral Nightly    [Held by provider] apixaban  5 mg Oral BID    sodium chloride flush  5-40 mL Intravenous 2 times per day    trospium  20 mg Oral BID     Continuous Infusions:   sodium chloride 50 mL/hr at 07/19/21 1158    sodium chloride       PRN Meds:sodium chloride flush, sodium chloride, ondansetron **OR** ondansetron, polyethylene glycol, acetaminophen **OR** acetaminophen, traMADol     Patient Active Problem List    Diagnosis Date Noted    Hyponatremia     Mobitz II     [facility] 07/17/2021    Syncope and collapse 03/30/2021      Active Hospital Problems    Diagnosis Date Noted    Hyponatremia [E87.1]     Mobitz II [I44.1]     NAMRATA (acute kidney injury) ([facility] [N17.9] 07/17/2021       Assessment:       Plan:   Plan:     -Syncope, left bundle branch block, Mobitz type II     I talked to his daughter and got a history over the phone. I reviewed his EKG and telemetry. He has episodes of 2-1 block that suddenly happened. They seem to be Mobitz type II at times. They are not rate related. The etiology of syncope can be due to dehydration, imbalance, orthostatic hypotension, or AV block. Given the findings on EKG and telemetry I think it is likely that one of the etiologies is the intermittent second-degree AV block and therefore he has an indication for pacemaker. I discussed this with him and his daughter and they are agreeable. The risks, benefits and alternatives of the procedure were discussed with the patient and his daughter. The risks including, but not limited to, the risks of bleeding, infection, pain, device malfunction, lead dislodgement, radiation exposure, injury to cardiac and surrounding structures (including pneumothorax), stroke, cardiac perforation, tamponade, need for emergent heart surgery, myocardial infarction and death were discussed in detail. Dual vs single chamber device, including risks and benefits were discussed with patient.          The patient/his power of  opted to proceed with the device implantation.      -NAMRATA on CKD                 Continue hydration. Creatinine has improved.     -History of DVT     Continue Eliquis.     -Hyponatremia     Adequate intake and hydration with saline.               NOTE: This report was transcribed using voice recognition software.  Every effort was made to ensure accuracy, however, inadvertent

## 2021-07-19 NOTE — PROGRESS NOTES
Hospitalist Progress Note      PCP: Claude Tse MD    Date of Admission: 7/17/2021    Chief Complaint: Syncope    Hospital Course:   General  No acute events on telemetry  No chest pain shortness of breath  No abdominal pain nausea vomiting        Medications:  Reviewed      Exam:    BP (!) 138/55   Pulse 56   Temp 99.1 °F (37.3 °C) (Oral)   Resp 16   Ht 5' 4\" (1.626 m)   Wt 141 lb 1.5 oz (64 kg)   SpO2 95%   BMI 24.22 kg/m²     General appearance: No apparent distress, appears stated age and cooperative. HEENT: Pupils equal, round, and reactive to light. Conjunctivae/corneas clear. Bruising on the upper lip bridge of the nose and forehead  Neck: Supple, with full range of motion. No jugular venous distention. Trachea midline. Respiratory:  Normal respiratory effort. Clear to auscultation, bilaterally without RALES/WHEEZES/Rhonchi. Cardiovascular: Regular rate and rhythm with normal S1/S2 without MURMURS, rubs or gallops. Abdomen: Soft, non-tender, non-distended with normal bowel sounds. Musculoskeletal: No clubbing, cyanosis or EDEMA bilaterally. Full range of motion without deformity. Skin: Skin color, texture, turgor normal.  No rashes or lesions. Neurologic:  Neurovascularly intact without any focal sensory/motor deficits. Cranial nerves: II-XII intact, grossly non-focal.  Physical exam unchanged from 7/18      Labs:   Recent Labs     07/17/21  1648 07/18/21  0601 07/19/21  0532   WBC 6.1 7.2 7.5   HGB 14.8 13.6 14.3   HCT 42.5 39.7* 40.3*    178 153     Recent Labs     07/17/21  1648 07/18/21  0601 07/19/21  0532   * 133* 132*   K 4.3 4.3 4.2    100 97*   CO2 23 21 20*   BUN 17 15 13   CREATININE 1.7* 1.4* 1.3   CALCIUM 10.2 9.5 9.4     Recent Labs     07/17/21  1648   AST 21   ALT 13   BILITOT 0.6   ALKPHOS 96     No results for input(s): INR in the last 72 hours.   Recent Labs     07/17/21  1648 07/18/21  1023   TROPONINI <0.01 <0.01       Urinalysis:      Lab Results   Component Value Date    NITRU Negative 03/31/2021    BLOODU Negative 03/31/2021    SPECGRAV 1.012 03/31/2021    GLUCOSEU 100 03/31/2021       Radiology:  MRI BRAIN WO CONTRAST   Final Result   1. Left frontal scalp hematoma/edema. No evidence of acute intracranial   hemorrhage. 2. Again seen heterogeneous sellar/suprasellar with mass effect on the optic   chiasm and extension into the left sphenoid sinus. Evaluation is limited   secondary to lack of intravenous contrast (acute kidney injury). 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 3. Involution parenchymal changes with moderate microvascular ischemic   disease. 4. No evidence of acute infarct or midline shift. CT FACIAL BONES WO CONTRAST   Final Result   No acute traumatic injury of the facial bones. CT Cervical Spine WO Contrast   Final Result   No acute abnormality of the cervical spine. Multilevel degenerative disc disease with mild multilevel facet arthropathy. CT Head WO Contrast   Final Result   1. No acute intracranial abnormality. 2. Small left forehead contusion/scalp hematoma. 3. 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 4. Senescent changes. XR CHEST PORTABLE   Final Result   No radiographic evidence of acute cardiopulmonary disease.              Assessment/Plan:    Active Hospital Problems    Diagnosis Date Noted    Hyponatremia [E87.1]     Mobitz II [I44.1]     NAMRATA (acute kidney injury) ([facility] [address] [N17.9] 07/17/2021       Acute Medical Issues Being Addressed:    49-year-old admitted to the hospital with syncope    Syncope probably secondary to dehydration with underlying Mobitz type II second-degree heart block  No EKG available in the system we will repeat 1  Troponin was negative  Had an echocardiogram recently in March which showed normal ejection

## 2021-07-20 PROBLEM — Z95.0 PACEMAKER: Status: ACTIVE | Noted: 2021-01-01

## 2021-07-20 NOTE — PROGRESS NOTES
Physical Therapy    Facility/Department: [address] NURSING  Initial Assessment    NAME: Tarun Melissa  : 1940  MRN: [id number]    Date of Service: 2021    Discharge Recommendations:  Tarun Melissa scored a 12/24 on the AM-PAC short mobility form. Current research shows that an AM-PAC score of 17 or less is typically not associated with a discharge to the patient's home setting. Based on the patient's AM-PAC score and their current functional mobility deficits, it is recommended that the patient have 3-5 sessions per week of Physical Therapy at d/c to increase the patient's independence. Please see assessment section for further patient specific details. If patient discharges prior to next session this note will serve as a discharge summary. Please see below for the latest assessment towards goals. 3-5 sessions per week   PT Equipment Recommendations  Equipment Needed: No    Assessment   Body structures, Functions, Activity limitations: Decreased functional mobility ; Decreased balance;Decreased cognition;Decreased endurance;Decreased strength;Decreased ADL status  Assessment: Pt presents with the above limitations. Pt was able to perform bed mobility and transfers with little assistance. Pt attempted to ambulate with CGA from bed to bathroom and at 5ft became weak an unresponsive. Total A of 2 back to bed. Pt became responsive once supine and was able to perform bed mobility to adjust position in bed. Nurse notified. Pt will benefit from skilled PT to restore baseline functional mobility. Treatment Diagnosis: decreased functional mobility associated wiht NAMRATA and insertion of pacemaker  Prognosis: Fair  Decision Making: Medium Complexity  Clinical Presentation: lethargic  PT Education: Goals; Equipment;PT Role;Plan of Care;General Safety;Precautions  Patient Education: pt verbalized understanding  Barriers to Learning: cognitive  REQUIRES PT FOLLOW UP: Yes  Activity Tolerance  Activity Tolerance: Treatment limited secondary to medical complications (free text); Patient limited by fatigue;Patient limited by cognitive status  Activity Tolerance: Pt had a near fall while ambulating- became unresponsive and totalx2 to return  to bed       Patient Diagnosis(es): The primary encounter diagnosis was Syncope and collapse. Diagnoses of Hematoma of scalp, initial encounter, Anticoagulated, Brain mass, and Elevated serum creatinine were also pertinent to this visit. has a past medical history of Arthritis, Brain aneurysm, Dementia ([address] Enlarged prostate, Essential hypertension, Foot pain, History of intracranial hemorrhage, Overactive bladder, Pituitary mass ([address] and Right leg DVT ([address] has a past surgical history that includes Appendectomy (1959) and Mouth surgery (2020). Restrictions  Restrictions/Precautions  Restrictions/Precautions: Cardiac (pacemaker)  Required Braces or Orthoses?: Yes (sling- no lifting overhead for 30 days)  Position Activity Restriction  Other position/activity restrictions: Luther Caballero is a 78 y.o. male who presents to the emergency department after a syncopal episode with head injury. Patient has history of dementia. Presents here with his daughter who he lives with and is his caregiver. Daughter states that he was coming out of his room using his cane when all of a sudden he became wobbly and then fell face forward and had a syncopal episode. He is anticoagulated on Eliquis with history of DVT in his right leg. Has history of a brain aneurysm and pituitary mass. Patient denies any pain at this time. Daughter states he has been eating less over the past few days. Has some abrasions over his face. Denies visual changes, headache, chest pain, shortness breath, abdominal pain, extremity pain, numbness or difficulty moving his extremities. He is alert to person and place but not time.   Daughter states this is normal for him with his history of dementia. Vision/Hearing  Vision: Impaired  Vision Exceptions: Wears glasses for reading  Hearing: Within functional limits     Subjective  General  Chart Reviewed: Yes  Patient assessed for rehabilitation services?: Yes  Family / Caregiver Present: No  Diagnosis: NAMRATA  Other (Comment): Pt supine in bed upon arrival. Pt agreeable to therapy  General Comment  Comments: pt lethargic upon arrival  Pain Screening  Patient Currently in Pain: Denies  Vital Signs  Patient Currently in Pain: Denies       Orientation  Orientation  Overall Orientation Status: Within Functional Limits  Social/Functional History  Social/Functional History  Lives With: Daughter  Type of Home: House  Home Layout: Two level  Home Access: Stairs to enter with rails  Entrance Stairs - Number of Steps: 1  Bathroom Shower/Tub: Tub/Shower unit  Bathroom Toilet: Standard  Bathroom Equipment: Shower chair  Bathroom Accessibility: Walker accessible  Home Equipment: Cane, Rolling walker  ADL Assistance: Needs assistance (sometimes daughter helps with ADL's)  Homemaking Assistance: Independent  Homemaking Responsibilities: No  Ambulation Assistance: Independent  Transfer Assistance: Independent  Active : No  Additional Comments: denies falls in the last 6 months- history of dementia/questionable historian. Per daughter pt independent, pt reporting assist for all ADLs and mobility. Cognition   Cognition  Overall Cognitive Status: Exceptions  Arousal/Alertness: Delayed responses to stimuli  Following Commands: Follows one step commands with repetition; Follows one step commands with increased time  Attention Span: Attends with cues to redirect  Memory: Decreased recall of biographical Information;Decreased recall of precautions;Decreased recall of recent events;Decreased short term memory  Safety Judgement: Decreased awareness of need for assistance;Decreased awareness of need for safety  Problem Solving: Assistance required to generate solutions;Assistance required to implement solutions;Assistance required to identify errors made  Insights: Not aware of deficits  Initiation: Requires cues for some  Sequencing: Requires cues for some    Objective     Observation/Palpation  Posture: Fair    AROM RLE (degrees)  RLE AROM: WFL  AROM LLE (degrees)  LLE AROM : WFL  AROM RUE (degrees)  RUE AROM : WFL  AROM LUE (degrees)  LUE AROM : WFL  Strength RLE  Strength RLE: WFL  Strength LLE  Strength LLE: WFL  Strength RUE  Strength RUE: WFL  Strength LUE  Strength LUE: WFL     Sensation  Overall Sensation Status: WFL  Bed mobility  Supine to Sit: Stand by assistance  Transfers  Sit to Stand: Stand by assistance  Ambulation  Ambulation?: Yes  More Ambulation?: No  Ambulation 1  Device: Hand-Held Assist  Assistance: Contact guard assistance  Gait Deviations: Slow Vanessa;Decreased step length  Distance: 5ft- pt's legs became shaky at 5ft in which he became unresponsive and went limp- total of 2 to get back into bed supine. Nurse notifed, vitals obtained and /65. HR 78, SpO2 93%     Balance  Posture: Fair  Sitting - Static: Good  Sitting - Dynamic: Fair  Standing - Static: Fair  Standing - Dynamic: [address]  Times per week: 3-5  Times per day: Daily  Current Treatment Recommendations: Transfer Training, Endurance Training, Strengthening, ROM, Balance Training, Gait Training, Functional Mobility Training  Safety Devices  Type of devices:  All fall risk precautions in place, Bed alarm in place, Patient at risk for falls, Nurse notified, Call light within reach  Restraints  Initially in place: No      AM-PAC Score  AM-PAC Inpatient Mobility Raw Score : 12 (07/20/21 1031)  AM-PAC Inpatient T-Scale Score : 35.33 (07/20/21 1031)  Mobility Inpatient CMS 0-100% Score: 68.66 (07/20/21 1031)  Mobility Inpatient CMS G-Code Modifier : CL (07/20/21 1031)      Goals  Short term goals  Time Frame for Short term goals: prior to discharge  Short term goal 1: pt will be able to ambulate with AAD 20ft with CGA  Short term goal 2: pt will be able to ambulate flight of stairs with cane and Min A  Short term goal 3: pt will be able to sit to/from stand independently  Short term goal 4: pt will be able to perform bed mobility independently  Patient Goals   Patient goals : return home       Therapy Time   Individual Concurrent Group Co-treatment   Time In 0952         Time Out 1017         Minutes 25         Timed Code Treatment Minutes: [address], SPT   Tarun Jernigan, PT Therapist observed and directed the above evaluation.  Thanks, Tarun Jernigan, [facility], DPT [id number]

## 2021-07-20 NOTE — PROGRESS NOTES
Occupational Therapy   Occupational Therapy Initial Assessment  Date: 2021   Patient Name: Luther Caballero  MRN: [id number]     : 1940    Date of Service: 2021    Discharge Recommendations: Luther Caballero scored a 17/24 on the AM-PAC ADL Inpatient form. Current research shows that an AM-PAC score of 17 or less is typically not associated with a discharge to the patient's home setting. Based on the patient's AM-PAC score and their current ADL deficits, it is recommended that the patient have 3-5 sessions per week of Occupational Therapy at d/c to increase the patient's independence. Please see assessment section for further patient specific details. If patient discharges prior to next session this note will serve as a discharge summary. Please see below for the latest assessment towards goals. OT Equipment Recommendations  Equipment Needed: No  Other: Defer to d/c location    Assessment   Performance deficits / Impairments: Decreased functional mobility ; Decreased ADL status; Decreased safe awareness;Decreased balance;Decreased high-level IADLs  Assessment: Pt is an 78 y.o. M with hx of dementia who had a pacemaker placed . Pt is currently below functional baseline and would benefit from continued acute OT to progress functional outcomes. Treatment Diagnosis: NAMRATA  Prognosis: Fair  Decision Making: Medium Complexity  OT Education: OT Role;Plan of Care;Precautions;Orientation  Patient Education: will require reptition  Barriers to Learning: cognition  REQUIRES OT FOLLOW UP: Yes  Activity Tolerance  Activity Tolerance: Patient limited by fatigue;Treatment limited secondary to medical complications (free text)  Activity Tolerance: pt legs began shaking after 2 steps and eyes rolled back. Assisted back to bed. /65. HR 78. RN aware. Safety Devices  Safety Devices in place: Yes  Type of devices: Left in bed;Call light within reach; Bed alarm in place;Gait belt;Nurse notified; All fall risk precautions in place; Patient at risk for falls           Patient Diagnosis(es): The primary encounter diagnosis was Syncope and collapse. Diagnoses of Hematoma of scalp, initial encounter, Anticoagulated, Brain mass, and Elevated serum creatinine were also pertinent to this visit. has a past medical history of Arthritis, Brain aneurysm, Dementia ([address] Enlarged prostate, Essential hypertension, Foot pain, History of intracranial hemorrhage, Overactive bladder, Pituitary mass ([address] and Right leg DVT ([facility] [address] has a past surgical history that includes Appendectomy (1959) and Mouth surgery (2020). Treatment Diagnosis: NAMRATA      Restrictions  Restrictions/Precautions  Restrictions/Precautions: Cardiac (pacemaker)  Required Braces or Orthoses?: Yes (sling- no lifting overhead for 30 days)  Position Activity Restriction  Other position/activity restrictions: Michelle Stiles is a 78 y.o. male who presents to the emergency department after a syncopal episode with head injury. Patient has history of dementia. Presents here with his daughter who he lives with and is his caregiver. Daughter states that he was coming out of his room using his cane when all of a sudden he became wobbly and then fell face forward and had a syncopal episode. He is anticoagulated on Eliquis with history of DVT in his right leg. Has history of a brain aneurysm and pituitary mass. Patient denies any pain at this time. Daughter states he has been eating less over the past few days. Has some abrasions over his face. Denies visual changes, headache, chest pain, shortness breath, abdominal pain, extremity pain, numbness or difficulty moving his extremities. He is alert to person and place but not time. Daughter states this is normal for him with his history of dementia.     Subjective   General  Chart Reviewed: Yes  Patient assessed for rehabilitation services?: Yes  Additional Pertinent Hx: hx of dementia  Family / Caregiver Present: No  Diagnosis: NAMRATA (acute kidney injury) ([facility] [address]  Subjective  Subjective: Pt met at bedside. Pleseant and agreeable to eval, lethargic. Patient Currently in Pain: Denies  Pain Assessment  Pain Assessment: 0-10  Pain Level: 0  Schwartz-Baker Pain Rating: No hurt  Vital Signs  Temp: 99.1 °F (37.3 °C)  Temp Source: Oral  Pulse: 77  Heart Rate Source: Monitor  Resp: 18  BP: (!) 112/59  BP Location: Right upper arm  MAP (mmHg): 76  Patient Position: Semi fowlers  Level of Consciousness: Alert (0)  MEWS Score: 1  Patient Currently in Pain: Denies  Oxygen Therapy  SpO2: 93 %  Pulse Oximeter Device Mode: Intermittent  Pulse Oximeter Device Location: Finger  O2 Device: None (Room air)  O2 Flow Rate (L/min): 0 L/min  Social/Functional History  Social/Functional History  Lives With: Daughter  Type of Home: House  Home Layout: Two level  Home Access: Stairs to enter with rails  Entrance Stairs - Number of Steps: 1  Bathroom Shower/Tub: Tub/Shower unit  Bathroom Toilet: Standard  Bathroom Equipment: Shower chair  Bathroom Accessibility: Walker accessible  Home Equipment: Cane, Rolling walker  ADL Assistance: Needs assistance (sometimes daughter helps with ADL's)  Homemaking Assistance: Independent  Homemaking Responsibilities: No  Ambulation Assistance: Independent  Transfer Assistance: Independent  Active : No  Additional Comments: denies falls in the last 6 months- history of dementia/questionable historian. Per daughter pt independent, pt reporting assist for all ADLs and mobility.        Objective        Orientation  Overall Orientation Status: Impaired  Orientation Level: Disoriented to situation;Oriented to time;Oriented to place;Oriented to person  Observation/Palpation  Posture: Fair  Balance  Sitting Balance: Stand by assistance  Standing Balance: Contact guard assistance  Standing Balance  Time: 1 min  Activity: functional mobility  Functional Mobility  Functional - Mobility Device: Other ([place] to mimic cane)  Activity: Other  Assist Level: Contact guard assistance  Functional Mobility Comments: Pt initally CGA for mobility with [place]. Pt took 2 steps, then legs started to shake and eyes rolled back. Pt was lowered to bed. Vitals taken. Pt reporting feeling dizzy. ADL  Feeding: Independent  Grooming: Contact guard assistance  UE Bathing: Contact guard assistance  LE Bathing: Minimal assistance  UE Dressing: Contact guard assistance  LE Dressing: Minimal assistance  Toileting: Minimal assistance  Tone RUE  RUE Tone: Normotonic  Tone LUE  LUE Tone: Normotonic  Coordination  Movements Are Fluid And Coordinated: Yes     Bed mobility  Supine to Sit: Stand by assistance  Sit to Supine: Maximum assistance (pt lifted to bed 2/2 pt appeared to faint in stance.)  Transfers  Sit to stand: Stand by assistance  Stand to sit: Maximum assistance  Transfer Comments: pt appeared to faint in stance, lowered to bed. Cognition  Overall Cognitive Status: Exceptions  Arousal/Alertness: Delayed responses to stimuli  Following Commands: Follows one step commands with repetition; Follows one step commands with increased time  Attention Span: Attends with cues to redirect  Memory: Decreased recall of biographical Information;Decreased recall of precautions;Decreased recall of recent events;Decreased short term memory  Safety Judgement: Decreased awareness of need for assistance;Decreased awareness of need for safety  Problem Solving: Assistance required to generate solutions;Assistance required to implement solutions;Assistance required to identify errors made  Insights: Not aware of deficits  Initiation: Requires cues for some  Sequencing: Requires cues for some        Sensation  Overall Sensation Status: WFL        LUE AROM (degrees)  LUE General AROM: did not formally assess 2/2 pacemaker precautions  Left Hand AROM (degrees)  Left Hand AROM: WFL  RUE AROM (degrees)  RUE AROM : WFL  Right Hand AROM (degrees)  Right Hand AROM: WFL  LUE Strength  LUE Strength Comment: did not formally assess 2/2 pacemaker precautions  RUE Strength  Gross RUE Strength: WFL                   Plan   Plan  Times per week: 3-5x  Times per day: Daily  Current Treatment Recommendations: Functional Mobility Training, Balance Training, Safety Education & Training, Self-Care / ADL, Home Management Training, Patient/Caregiver Education & Training          AM-[facility] Inpatient Daily Activity Raw Score: 17 (07/20/21 1033)  AM-PAC Inpatient ADL T-Scale Score : 37.26 (07/20/21 1033)  ADL Inpatient CMS 0-100% Score: 50.11 (07/20/21 1033)  ADL Inpatient CMS G-Code Modifier : CK (07/20/21 1033)    Goals  Short term goals  Time Frame for Short term goals: d/c  Short term goal 1: Pt will complete LBD with CGA  Short term goal 2: Pt will complete toilet transfer with CGA  Short term goal 3: pt will complete toileting with CGA  Short term goal 4: Pt will complete UB bathing with CGA.   Long term goals  Time Frame for Long term goals : STGs=LTGs       Therapy Time   Individual Concurrent Group Co-treatment   Time In 0951         Time Out 1016         Minutes 25              Timed Code Treatment Minutes:  10 Minutes    Total Treatment Minutes:  25 min       Cecile Rahman OT

## 2021-07-20 NOTE — PROGRESS NOTES
[facility] Hospital Post Op Implant/PDE Device Check  Rep Checked Device   Device shows normal function  No parameters have been changed during the current session.

## 2021-07-20 NOTE — CARE COORDINATION
Return call from [address] [place] [address]. No referral currently received by facility for review. Referral initiated at this time, and will be awaiting acceptance or denial within the next 2 hours.

## 2021-07-20 NOTE — PROGRESS NOTES
4 Eyes Skin Assessment     NAME:  Marsha Rios OF BIRTH:  1940  MEDICAL RECORD NUMBER:  [id number]    The patient is being assess for  Transfer to New Unit    I agree that 2 RN's have performed a thorough Head to Toe Skin Assessment on the patient. ALL assessment sites listed below have been assessed. Areas assessed by both nurses:    Head, Face, Ears, Shoulders, Back, Chest, Arms, Elbows, Hands, Sacrum. Buttock, Coccyx, Ischium and Legs. Feet and Heels        Does the Patient have a Wound?  No noted wound(s), pacemaker to L chest wall       Eder Prevention initiated:  Yes   Wound Care Orders initiated:  NA    Pressure Injury (Stage 3,4, Unstageable, DTI, NWPT, and Complex wounds) if present place consult order under [de-identified] NA    New and Established Ostomies if present place consult order under : NA      Nurse 1 eSignature: Electronically signed by Billie Kent RN on 7/20/21 at 11:14 AM EDT    **SHARE this note so that the co-signing nurse is able to place an eSignature**    Nurse 2 eSignature: Electronically signed by Clarissa Crystal RN on 7/20/21 at 7:39 PM EDT

## 2021-07-20 NOTE — PROGRESS NOTES
Assessment and med pass complete. Meds taken one at a time with water. Dressing at pacemaker site dry and intact. Left arm in swath to remind patient not to lift arm above shoulder. Refused repositioning, placed pillow under legs to keep heels elevated off bed. Denies needs, call light in reach, bed alarm engaged.

## 2021-07-20 NOTE — DISCHARGE INSTR - COC
Continuity of Care Form    Patient Name: Tali Iverson   :  1940  MRN:  [id number]    Admit date:  2021  Discharge date:  2021    Code Status Order: Full Code   Advance Directives:      Admitting Physician:  Checo Smith MD  PCP: Morris Liu MD    Discharging Nurse: [facility] Unit/Room#: 5OH-9595/5424-72  Discharging Unit Phone Number: [phone]    Emergency Contact:   Extended Emergency Contact Information  Primary Emergency Contact: Claudia Edmonds Phone: [phone]  Relation: Child  Secondary Emergency Contact: Bradly Mayfield, [address] [place] [address]. Phone: [phone]  Relation: Grandchild    Past Surgical History:  Past Surgical History:   Procedure Laterality Date    APPENDECTOMY      MOUTH SURGERY      molars removed       Immunization History: There is no immunization history on file for this patient.     Active Problems:  Patient Active Problem List   Diagnosis Code    Syncope and collapse R55    [facility] N17.9    Hyponatremia E87.1    Mobitz II I44.1    Pacemaker Z95.0       Isolation/Infection:   Isolation            No Isolation          Patient Infection Status       None to display            Nurse Assessment:  Last Vital Signs: /61   Pulse 79   Temp 99 °F (37.2 °C) (Oral)   Resp 18   Ht 5' 4\" (1.626 m)   Wt 140 lb 3.4 oz (63.6 kg)   SpO2 95%   BMI 24.07 kg/m²     Last documented pain score (0-10 scale): Pain Level: 0  Last Weight:   Wt Readings from Last 1 Encounters:   21 140 lb 3.4 oz (63.6 kg)     Mental Status:  disoriented    IV Access:  - None    Nursing Mobility/ADLs:  Walking   Assisted  Transfer  Assisted  Bathing  Assisted  Dressing  Assisted  Toileting  Assisted  Feeding  Assisted  Med Admin  Assisted  Med Delivery   whole    Wound Care Documentation and Therapy:  Wound 21 Nose abrasion (Active)   Wound Etiology Other 21 1045   Dressing Status Other (Comment) 21 1045 Wound Cleansed Not Cleansed 07/20/21 1045   Dressing/Treatment Open to air 07/20/21 1045   Wound Assessment Epithelialization 07/20/21 1045   Drainage Amount None 07/20/21 1045   Odor None 07/20/21 1045   Emrcedez-wound Assessment Intact;Edematous 07/20/21 1045   Number of days: 1        Elimination:  Continence:   · Bowel: No  · Bladder: No  Urinary Catheter: None   Colostomy/Ileostomy/Ileal Conduit: No       Date of Last BM: 8/2/2021    Intake/Output Summary (Last 24 hours) at 7/20/2021 1133  Last data filed at 7/20/2021 0542  Gross per 24 hour   Intake 883.42 ml   Output 400 ml   Net 483.42 ml     I/O last 3 completed shifts: In: 903.4 [I.V.:903.4]  Out: 400 [Urine:400]    Safety Concerns:     History of Falls (last 30 days)    Impairments/Disabilities:      None    Nutrition Therapy:  Current Nutrition Therapy:   - Oral Diet:  General    Routes of Feeding: Oral  Liquids: Thin Liquids  Daily Fluid Restriction: no  Last Modified Barium Swallow with Video (Video Swallowing Test): not done    Treatments at the Time of Hospital Discharge:   Respiratory Treatments: none  Oxygen Therapy:  is not on home oxygen therapy.   Ventilator:    - No ventilator support    Rehab Therapies: Physical Therapy and Occupational Therapy  Weight Bearing Status/Restrictions: No weight bearing restirctions  Other Medical Equipment (for information only, NOT a DME order):  walker  Other Treatments: none    Patient's personal belongings (please select all that are sent with patient):  Tristen    RN SIGNATURE:  Electronically signed by Hector Mariee RN on 8/2/21 at 5:46 PM EDT    CASE MANAGEMENT/SOCIAL WORK SECTION    Inpatient Status Date: 7/17/21    Readmission Risk Assessment Score:  Readmission Risk              Risk of Unplanned Readmission:  13           Discharging to Facility/ Agency    Name:   Name: Danielle Pandey ROMMELXDHBIBIANA:[phone]  [facility]er[facility] Montano BNA:[phone]  ·   · Address:  · Phone:  · Fax:    Dialysis Facility (if applicable)

## 2021-07-20 NOTE — PROGRESS NOTES
Patient is pleasantly confused, sitting up in bed eating breakfast, took his medications whole with water without any issues. IVF infusing. Sling in place d/t s/p pacemaker. Patient will be transferring to a different room on 3A. Report given to 3A nurse.

## 2021-07-20 NOTE — DISCHARGE SUMMARY
07/17/2021    ALT 13 07/17/2021    AST 21 07/17/2021    BILITOT 0.6 07/17/2021    LABALBU 4.0 07/17/2021     Lab Results   Component Value Date    INR 1.66 (H) 04/01/2021    INR 1.99 (H) 03/31/2021    INR 1.86 (H) 03/30/2021           The patient was seen and examined on day of discharge and this discharge summary is in conjunction with any daily progress note from day of discharge. Time Spent on discharge is 45 minutes  in the examination, evaluation, counseling and review of medications and discharge plan. Note that greater  than 30 minutes was spent in preparing discharge papers, discussing discharge with patient, medication review, etc.       Signed:    Travon Sapp MD   7/20/2021      Thank you Mandy Carlin MD for the opportunity to be involved in this patient's care.  If you have any questions or concerns please feel free to contact me

## 2021-07-20 NOTE — PROGRESS NOTES
Hospitalist Progress Note      PCP: Abdiaziz Bueno MD    Date of Admission: 7/17/2021    Chief Complaint: Syncope    Hospital Course:   Patient seen this morning earlier when he stood up with physical therapy felt lightheaded and almost passed out  Orthostatics done were positive  No arrhythmias noted on interrogation of pacemaker  Patient denies any chest pain shortness of breath  No nausea vomiting          Medications:  Reviewed      Exam:    /61   Pulse 79   Temp 99 °F (37.2 °C) (Oral)   Resp 18   Ht 5' 4\" (1.626 m)   Wt 140 lb 3.4 oz (63.6 kg)   SpO2 95%   BMI 24.07 kg/m²     General appearance: No apparent distress, appears stated age and cooperative. HEENT: Pupils equal, round, and reactive to light. Conjunctivae/corneas clear. Bruising on the upper lip bridge of the nose and forehead  Neck: Supple, with full range of motion. No jugular venous distention. Trachea midline. Respiratory:  Normal respiratory effort. Clear to auscultation, bilaterally without RALES/WHEEZES/Rhonchi. Cardiovascular: Regular rate and rhythm with normal S1/S2 without MURMURS, rubs or gallops. Abdomen: Soft, non-tender, non-distended with normal bowel sounds. Musculoskeletal: No clubbing, cyanosis or EDEMA bilaterally. Full range of motion without deformity. Skin: Skin color, texture, turgor normal.  No rashes or lesions. Neurologic:  Neurovascularly intact without any focal sensory/motor deficits.  Cranial nerves: II-XII intact, grossly non-focal.  Left pacemaker in place dressing at the site      Labs:   Recent Labs     07/18/21  0601 07/19/21  0532 07/20/21  0604   WBC 7.2 7.5 9.3   HGB 13.6 14.3 13.8   HCT 39.7* 40.3* 39.1*    153 147     Recent Labs     07/18/21  0601 07/19/21  0532 07/20/21  0604   * 132* 133*   K 4.3 4.2 4.3    97* 98*   CO2 21 20* 23   BUN 15 13 14   CREATININE 1.4* 1.3 1.3   CALCIUM 9.5 9.4 9.2     Recent Labs     07/17/21  1648   AST 21   ALT 13   BILITOT 0. 6   ALKPHOS 96     No results for input(s): INR in the last 72 hours. Recent Labs     07/17/21  1648 07/18/21  1023   TROPONINI <0.01 <0.01       Urinalysis:      Lab Results   Component Value Date    NITRU Negative 03/31/2021    BLOODU Negative 03/31/2021    SPECGRAV 1.012 03/31/2021    GLUCOSEU 100 03/31/2021       Radiology:  XR CHEST PORTABLE   Final Result   No acute cardiopulmonary disease. No left-sided pneumothorax following pacer   placement. MRI BRAIN WO CONTRAST   Final Result   1. Left frontal scalp hematoma/edema. No evidence of acute intracranial   hemorrhage. 2. Again seen heterogeneous sellar/suprasellar with mass effect on the optic   chiasm and extension into the left sphenoid sinus. Evaluation is limited   secondary to lack of intravenous contrast (acute kidney injury). 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 3. Involution parenchymal changes with moderate microvascular ischemic   disease. 4. No evidence of acute infarct or midline shift. CT FACIAL BONES WO CONTRAST   Final Result   No acute traumatic injury of the facial bones. CT Cervical Spine WO Contrast   Final Result   No acute abnormality of the cervical spine. Multilevel degenerative disc disease with mild multilevel facet arthropathy. CT Head WO Contrast   Final Result   1. No acute intracranial abnormality. 2. Small left forehead contusion/scalp hematoma. 3. 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 4. Senescent changes. XR CHEST PORTABLE   Final Result   No radiographic evidence of acute cardiopulmonary disease.          NM MYOCARDIAL SPECT REST EXERCISE OR RX    (Results Pending)       Assessment/Plan:    Active Hospital Problems    Diagnosis Date Noted    Hyponatremia [E87.1]     Mobitz II [I44.1]     NAMRATA (acute kidney injury) [facility]) [N17.9] 07/17/2021       Acute Medical Issues Being Addressed:    78-year-old admitted to the hospital with syncope    Syncope probably secondary to dehydration with underlying Mobitz type II second-degree heart block with associated orthostatic hypotension  Diltiazem has been discontinued  EKG showed type II second-degree heart block hence he had dual-chamber pacemaker placed  Had an echocardiogram recently in March which showed normal ejection fraction with mild to moderate AR  On telemetry  Orthostatics are positive  We will hold off on any discharge start IV fluids 100 mL/h we will see how he does with fluid resuscitation over the next 24 hours we will repeat orthostatics tomorrow cardiology also ordered a stress test for tomorrow  If orthostatics are negative and stress test is negative then possible discharge tomorrow        Acute kidney injury prerenal on baseline of chronic kidney disease stage III  Creatinine 1.7 on admission improved with IV fluids back to baseline of around 1.3    Previous DVT  On chronic anticoagulation Eliquis restarted by cardiology    Previous brain aneurysm and suprasellar mass  Followed up with   CT head shows unchanged  MRI of the brain noted    Patient will need placement that has already been arranged for      DVT Prophylaxis: On Eliquis  Diet: ADULT DIET;  Regular  Adult Oral Nutrition Supplement; Standard High Calorie/High Protein Oral Supplement  Diet NPO  Code Status: Full Code      Dispo - once acute medical processes have resolved    Chantel Goyal MD

## 2021-07-21 PROBLEM — J98.11 ATELECTASIS: Status: ACTIVE | Noted: 2021-01-01

## 2021-07-21 PROBLEM — F03.90 DEMENTIA (HCC): Status: ACTIVE | Noted: 2021-01-01

## 2021-07-21 PROBLEM — N18.30 CKD (CHRONIC KIDNEY DISEASE) STAGE 3, GFR 30-59 ML/MIN (HCC): Status: ACTIVE | Noted: 2021-01-01

## 2021-07-21 PROBLEM — Z86.718 HISTORY OF DVT (DEEP VEIN THROMBOSIS): Status: ACTIVE | Noted: 2021-01-01

## 2021-07-21 PROBLEM — K56.41 FECAL IMPACTION (HCC): Status: ACTIVE | Noted: 2021-01-01

## 2021-07-21 PROBLEM — I10 HTN (HYPERTENSION): Status: ACTIVE | Noted: 2021-01-01

## 2021-07-21 PROBLEM — R50.9 FEVER: Status: ACTIVE | Noted: 2021-01-01

## 2021-07-21 PROBLEM — N18.9 ACUTE KIDNEY INJURY SUPERIMPOSED ON CKD (HCC): Status: ACTIVE | Noted: 2021-01-01

## 2021-07-21 NOTE — PROGRESS NOTES
Occupational Therapy  Facility/Department: [facility] 3A NURSING  Daily Treatment Note  NAME: Alexander Dolan  : 1940  MRN: [id number]    Date of Service: 2021    Discharge Recommendations:[facility] scored a  on the AM-PAC ADL Inpatient form. Current research shows that an AM-PAC score of 17 or less is typically not associated with a discharge to the patient's home setting. Based on the patient's AM-PAC score and their current ADL deficits, it is recommended that the patient have 3-5 sessions per week of Occupational Therapy at d/c to increase the patient's independence. Please see assessment section for further patient specific details. If patient discharges prior to next session this note will serve as a discharge summary. Please see below for the latest assessment towards goals. OT Equipment Recommendations  Equipment Needed: No    Assessment   Performance deficits / Impairments: Decreased functional mobility ; Decreased ADL status; Decreased safe awareness;Decreased balance;Decreased high-level IADLs  Assessment: Pt is an 78 y.o. M with hx of dementia who had a pacemaker placed . Pt is currently below functional baseline and would benefit from continued acute OT to progress functional outcomes. Treatment Diagnosis: NAMRATA  Prognosis: Fair  OT Education: OT Role;Plan of Care;Precautions;Orientation  Patient Education: will require reptition  Barriers to Learning: cognition  REQUIRES OT FOLLOW UP: Yes  Activity Tolerance  Activity Tolerance: Patient limited by fatigue;Treatment limited secondary to medical complications (free text)  Activity Tolerance: Positive orthostatics (see vitals section)  Safety Devices  Safety Devices in place: Yes  Type of devices: Left in bed;Call light within reach; Bed alarm in place;Gait belt;Nurse notified; All fall risk precautions in place; Patient at risk for falls         Patient Diagnosis(es): The primary encounter diagnosis was Syncope and collapse. Diagnoses of Hematoma of scalp, initial encounter, Anticoagulated, Brain mass, and Elevated serum creatinine were also pertinent to this visit. has a past medical history of Arthritis, Brain aneurysm, Dementia ([facility] Enlarged prostate, Essential hypertension, Foot pain, History of intracranial hemorrhage, Overactive bladder, Pituitary mass ([address] and Right leg DVT ([facility] has a past surgical history that includes Appendectomy (1959) and Mouth surgery (2020). Restrictions  Restrictions/Precautions  Restrictions/Precautions: Cardiac, Fall Risk (Pacemaker, High fall risk)  Required Braces or Orthoses?: Yes (no lifting, no overhead/shoulder flexion overhead x 30 days)  Position Activity Restriction  Other position/activity restrictions: Marnie Chatman is a 78 y.o. male who presents to the emergency department after a syncopal episode with head injury. Patient has history of dementia. Presents here with his daughter who he lives with and is his caregiver. Daughter states that he was coming out of his room using his cane when all of a sudden he became wobbly and then fell face forward and had a syncopal episode. He is anticoagulated on Eliquis with history of DVT in his right leg. Has history of a brain aneurysm and pituitary mass. Patient denies any pain at this time. Daughter states he has been eating less over the past few days. Has some abrasions over his face. Denies visual changes, headache, chest pain, shortness breath, abdominal pain, extremity pain, numbness or difficulty moving his extremities. He is alert to person and place but not time. Daughter states this is normal for him with his history of dementia. Subjective   General  Chart Reviewed: Yes  Patient assessed for rehabilitation services?: Yes  Additional Pertinent Hx: hx of dementia  Family / Caregiver Present: No  Diagnosis: NAMRATA (acute kidney injury) ([facility]  Subjective  Subjective: Pt met at bedside.  Pleseant and agreeable to session, lethargic. Vital Signs  Patient Currently in Pain: Denies   Orientation  Orientation  Overall Orientation Status: Impaired  Orientation Level: Disoriented to situation;Oriented to time;Oriented to place;Oriented to person  Objective    ADL  LE Dressing: Dependent/Total  Toileting: Dependent/Total (purwick in place)        Balance  Sitting Balance: Contact guard assistance  Standing Balance: Minimal assistance (static stance only at EOB this date, patient unable to advance LEs (also became orthosatic(see vitals section)))  Standing Balance  Time: ~15-20 seconds with encouragement  Bed mobility  Supine to Sit: Moderate assistance  Sit to Supine: Moderate assistance (BLE back into bed)  Scooting: Stand by assistance  Transfers  Sit to stand: Stand by assistance  Stand to sit: Moderate assistance (to initiate stand to sit)                       Cognition  Overall Cognitive Status: Exceptions  Arousal/Alertness: Delayed responses to stimuli  Following Commands: Follows one step commands with repetition; Follows one step commands with increased time  Attention Span: Attends with cues to redirect  Memory: Decreased recall of biographical Information;Decreased recall of precautions;Decreased recall of recent events;Decreased short term memory  Safety Judgement: Decreased awareness of need for assistance;Decreased awareness of need for safety  Problem Solving: Assistance required to generate solutions;Assistance required to implement solutions;Assistance required to identify errors made  Insights: Not aware of deficits  Initiation: Requires cues for some  Sequencing: Requires cues for some                                         Plan   Plan  Times per week: 3-5x  Times per day: Daily  Current Treatment Recommendations: Functional Mobility Training, Balance Training, Safety Education & Training, Self-Care / ADL, Home Management Training, Patient/Caregiver Education & Training  AM-PAC Score        AM-PAC Inpatient

## 2021-07-21 NOTE — PROGRESS NOTES
Physical Therapy  Facility/Department: [address] NURSING  Daily Treatment Note  NAME: Siomara Juares  : 1940  MRN: [id number]    Date of Service: 2021    Discharge Recommendations:Paco Delgado scored a  on the AM-PAC short mobility form. Current research shows that an AM-PAC score of 17 or less is typically not associated with a discharge to the patient's home setting. Based on the patient's AM-PAC score and their current functional mobility deficits, it is recommended that the patient have 3-5 sessions per week of Physical Therapy at d/c to increase the patient's independence. Please see assessment section for further patient specific details. If patient discharges prior to next session this note will serve as a discharge summary. Please see below for the latest assessment towards goals. 3-5 sessions per week   PT Equipment Recommendations  Equipment Needed: No    Assessment   Body structures, Functions, Activity limitations: Decreased functional mobility ; Decreased balance;Decreased cognition;Decreased endurance;Decreased strength;Decreased ADL status  Assessment: Pt presents with limitations in safe functional mobility. Pt required modA for bed mobility and Arianne for STS. Pt was unable to tolerate ambulation on this date due to orthostatic hypotension. Pt was lethargic and Ox2 during session. Pt requires further skilled PT services to promote safe functional mobility. Treatment Diagnosis: decreased functional mobility associated wiht NAMRATA and insertion of pacemaker  Prognosis: Fair  Decision Making: Medium Complexity  PT Education: PT Role;Transfer Training;General Safety;Orientation  Patient Education: pt verbalized understanding, will require reinforcement  Barriers to Learning: cognitive  REQUIRES PT FOLLOW UP: Yes  Activity Tolerance  Activity Tolerance: Treatment limited secondary to medical complications (free text); Patient limited by fatigue;Patient limited by cognitive

## 2021-07-21 NOTE — PROGRESS NOTES
Hospitalist Progress Note      PCP: Elbert Dueñas MD    Date of Admission: 7/17/2021    Chief Complaint: [facility] Course: 79 yo M with history of brain aneurysm, sellar mass, h/o DVT on Eliquis, dementia, CKD 3 who was brought to ER for syncope and fall. In ED, found to have NAMRATA on CKD and scalp hematoma. Admitted as inpatient for further management. Noted to be in Mobitz II, s/p PPM on 7/19/21. SPECT requested per Cardio to r/o CAD. Developed fevers on evening of 7/20 and has more swelling in L PPM pocket on 7/21/21; Eliquis held on 7/21/21. Noted to have atelectasis and fecal impaction. Molasses enema ordered on 7/21. Will go to [facility] SNF upon DC. Subjective:   Patient had fevers overnight. Awake. No CP, HA or abdominal pain. Has not had BM. States he lives with daughter and granddaughter. Can move L hand and arm. Aware he is at [facility].      Medications:  Reviewed    Infusion Medications    sodium chloride 100 mL/hr at 07/21/21 0631    sodium chloride       Scheduled Medications    sodium chloride flush  5-40 mL Intravenous 2 times per day    rosuvastatin  10 mg Oral Nightly    trospium  20 mg Oral BID     PRN Meds: sodium chloride flush, sodium chloride, ondansetron **OR** ondansetron, polyethylene glycol, acetaminophen **OR** acetaminophen      Intake/Output Summary (Last 24 hours) at 7/21/2021 2242  Last data filed at 7/21/2021 1542  Gross per 24 hour   Intake 2811.44 ml   Output 200 ml   Net 2611.44 ml       Physical Exam Performed:    BP 98/60   Pulse 77   Temp 97.4 °F (36.3 °C) (Oral)   Resp 16   Ht 5' 4\" (1.626 m)   Wt 147 lb 0.8 oz (66.7 kg)   SpO2 95%   BMI 25.24 kg/m²     General appearance: No apparent distress, appears stated age and cooperative. HEENT: Pupils equal, round, and reactive to light. Conjunctivae/corneas clear. Neck: Supple, with full range of motion. No jugular venous distention. Trachea midline.   Respiratory: Normal respiratory effort. Clear to auscultation, bilaterally without Rales/Wheezes/Rhonchi. Cardiovascular: Regular rate and rhythm with normal S1/S2 without murmurs, rubs or gallops. L PPM site is swollen  Abdomen: Soft, non-tender, mildly distended with normal bowel sounds. Musculoskeletal: No clubbing, cyanosis or edema bilaterally. Full range of motion without deformity. Skin: Skin color, texture, turgor normal.  No rashes or lesions. Neurologic:  Neurovascularly intact without any focal sensory/motor deficits. Cranial nerves: II-XII intact, grossly non-focal.  Psychiatric: Alert and oriented, thought content appropriate, normal insight  Capillary Refill: Brisk,3 seconds, normal   Peripheral Pulses: +2 palpable, equal bilaterally       Labs:   Recent Labs     07/19/21  0532 07/20/21  0604 07/21/21  0137   WBC 7.5 9.3 9.5   HGB 14.3 13.8 14.0   HCT 40.3* 39.1* 39.7*    147 120*     Recent Labs     07/19/21  0532 07/20/21  0604 07/21/21  0137   * 133* 134*   K 4.2 4.3 3.8   CL 97* 98* 102   CO2 20* 23 22   BUN 13 14 12   CREATININE 1.3 1.3 1.1   CALCIUM 9.4 9.2 8.9     Recent Labs     07/21/21  0137   AST 36   ALT 17   BILITOT 0.7   ALKPHOS 74     No results for input(s): INR in the last 72 hours. No results for input(s): Jokenyatta Moonks in the last 72 hours. Urinalysis:      Lab Results   Component Value Date    NITRU Negative 07/21/2021    WBCUA 1 07/21/2021    RBCUA 3 07/21/2021    BLOODU Negative 07/21/2021    SPECGRAV 1.015 07/21/2021    GLUCOSEU Negative 07/21/2021       Radiology:  XR ABDOMEN (KUB) (SINGLE AP VIEW)   Final Result   Large amount of stool in the colon suggests constipation. Dilated air-filled   loops of small bowel and colon in association. Fecal impaction may be   considered clinically. CT CHEST WO CONTRAST   Final Result   De pendant opacity and bandlike opacities are seen at the lung bases, with   atelectasis favored over pneumonia.   Trace pleural effusions are seen. Lytic and sclerotic appearing T9 vertebral body. Some certain measuring not   this represents a typical hemangioma common etiology such as Paget's disease,   or metastatic disease. Consider further characterization with bone scan. CT HEAD WO CONTRAST   Final Result   No acute intracranial abnormality. 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 Enlarging left frontal scalp hematoma. XR CHEST PORTABLE   Final Result   Interval development of bibasilar airspace disease, atelectasis favored over   pneumonia. XR CHEST PORTABLE   Final Result   No acute cardiopulmonary disease. No left-sided pneumothorax following pacer   placement. MRI BRAIN WO CONTRAST   Final Result   1. Left frontal scalp hematoma/edema. No evidence of acute intracranial   hemorrhage. 2. Again seen heterogeneous sellar/suprasellar with mass effect on the optic   chiasm and extension into the left sphenoid sinus. Evaluation is limited   secondary to lack of intravenous contrast (acute kidney injury). 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 3. Involution parenchymal changes with moderate microvascular ischemic   disease. 4. No evidence of acute infarct or midline shift. CT FACIAL BONES WO CONTRAST   Final Result   No acute traumatic injury of the facial bones. CT Cervical Spine WO Contrast   Final Result   No acute abnormality of the cervical spine. Multilevel degenerative disc disease with mild multilevel facet arthropathy. CT Head WO Contrast   Final Result   1. No acute intracranial abnormality. 2. Small left forehead contusion/scalp hematoma. 3. 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 4. Senescent changes.          XR CHEST PORTABLE   Final Result   No radiographic evidence of acute cardiopulmonary disease. NM MYOCARDIAL SPECT REST EXERCISE OR RX    (Results Pending)           Assessment/Plan:    Active Hospital Problems    Diagnosis     CKD (chronic kidney disease) stage 3, GFR 30-59 ml/min ([facility]) [N18.30]     HTN (hypertension) [I10]     Fever [R50.9]     Fecal impaction ([facility]) [K56.41]     Dementia ([facility] [F03.90]     History of DVT (deep vein thrombosis) [Z86.718]     Atelectasis [J98.11]     Pacemaker [Z95.0]     Hyponatremia [E87.1]     Mobitz II [I44.1]     Acute kidney injury superimposed on CKD ([facility] [N17.9, N18.9]     Syncope and collapse [R55]      1. Check STAT CT Chest to r/o PNA and hematoma in PPM pocket  2. Hold Eliquis  3. Hold ABx, no clear source at this time  4. NO NARCOTICS OR NEUROSEDATING MEDS  5. PT/OT eval  6. Stop Ditropan and Flomax given recurrent orthostasis  7. Follow up with  Neurosurgery upon DC  8. Check KUB for constipation  9. Molasses enema X 1  10. SPECT per Cardio; will keep NPO p MN    DVT Prophylaxis: SCD  Diet: ADULT DIET; Regular  Code Status: Full Code    PT/OT Eval Status: Following    Dispo - [facility]    Discussed with patient, nursing and CM. Not ready for DC to SNF today. SNF in next 24-48 hrs if no fevers.     Hira Rivero MD

## 2021-07-21 NOTE — PLAN OF CARE
Problem: Falls - Risk of:  Goal: Will remain free from falls  Description: Will remain free from falls  7/20/2021 2324 by Ruthy Euceda RN  Outcome: Ongoing  7/20/2021 1112 by Kim Michael RN  Outcome: Ongoing  Goal: Absence of physical injury  Description: Absence of physical injury  7/20/2021 1112 by Kim Michael RN  Outcome: Ongoing     Problem: Pain:  Goal: Pain level will decrease  Description: Pain level will decrease  7/20/2021 1112 by Kim Michael RN  Outcome: Ongoing  Goal: Control of acute pain  Description: Control of acute pain  7/20/2021 2324 by Ruthy Euceda RN  Outcome: Ongoing  7/20/2021 1112 by Kim Michael RN  Outcome: Ongoing  Goal: Control of chronic pain  Description: Control of chronic pain  7/20/2021 1112 by Kim Michael RN  Outcome: Ongoing     Problem: Fluid Volume:  Goal: Ability to achieve a balanced intake and output will improve  Description: Ability to achieve a balanced intake and output will improve  7/20/2021 2324 by Ruthy Euceda RN  Outcome: Ongoing  7/20/2021 1112 by Kim Michael RN  Outcome: Ongoing     Problem: Physical Regulation:  Goal: Ability to maintain clinical measurements within normal limits will improve  Description: Ability to maintain clinical measurements within normal limits will improve  7/20/2021 2324 by Ruthy Euceda RN  Outcome: Ongoing  7/20/2021 1112 by Kim Michael RN  Outcome: Ongoing  Goal: Will show no signs and symptoms of electrolyte imbalance  Description: Will show no signs and symptoms of electrolyte imbalance  7/20/2021 2324 by Ruthy Euceda RN  Outcome: Ongoing  7/20/2021 1112 by Kim Michael RN  Outcome: Ongoing     Problem: Cardiovascular  Goal: No DVT, peripheral vascular complications  4/14/8789 1112 by Kim Michael RN  Outcome: Ongoing  Goal: Hemodynamic stability  7/20/2021 2324 by Ruthy Euceda RN  Outcome: Ongoing  7/20/2021 1112 by Kim Michael RN  Outcome: Ongoing  Goal: Anticoagulate/Hct stable  7/20/2021 1112 by Goyo Og RN  Outcome: Ongoing  Goal: Agreement to quit smoking  7/20/2021 1112 by Goyo Og RN  Outcome: Ongoing  Goal: Weight maintained or lost  7/20/2021 1112 by Goyo Og RN  Outcome: Ongoing  Goal: Understanding of dietary restrictions  7/20/2021 1112 by Goyo Og RN  Outcome: Ongoing     Problem: Skin Integrity:  Goal: Will show no infection signs and symptoms  Description: Will show no infection signs and symptoms  7/20/2021 1112 by Goyo Og RN  Outcome: Ongoing  Goal: Absence of new skin breakdown  Description: Absence of new skin breakdown  7/20/2021 2324 by Edgra Rice RN  Outcome: Ongoing  7/20/2021 1112 by Goyo Og RN  Outcome: Ongoing     Problem: Musculor/Skeletal Functional Status  Goal: Highest potential functional level  7/20/2021 2324 by Edgar Rice RN  Outcome: Ongoing  7/20/2021 1112 by Goyo Og RN  Outcome: Ongoing  Goal: Absence of falls  7/20/2021 2324 by Edgar Rice RN  Outcome: Ongoing  7/20/2021 1112 by Goyo Og RN  Outcome: Ongoing     Problem: Nutrition  Goal: Optimal nutrition therapy  7/20/2021 1418 by Chauncey Bal RD, LD  Outcome: Ongoing

## 2021-07-21 NOTE — PROGRESS NOTES
Notified cardiology yesterday afternoon regarding stress test order. Patient unable to move left arm above head for imaging d/t recent pacemaker placement. Jes Louis from cardiology to notify ordering provider.

## 2021-07-21 NOTE — ACP (ADVANCE CARE PLANNING)
Advanced Care Planning Note. Purpose of Encounter: Advanced care planning in light of dementia  Parties In Attendance: Patient, Meseret RN  Decisional Capacity: Limited  Subjective: Patient is constipated  Objective: Cr 1.1  Goals of Care Determination: Patient wants full support (CPR, vent, surgery, HD, trach, PEG)  Plan:  CT chest, KUB. Cardio consult. SNF upon DC. Code Status: Full code   Time spent on Advanced care Plannin minutes  Advanced Care Planning Documents: Completed advanced directives on chart, daughter is the POA.     Arcelia Magallanes MD  2021 10:47 PM

## 2021-07-21 NOTE — PROGRESS NOTES
Discussed stress testing post PPM with Dr. Liseth Rizzo and mayco to proceed with stress test from EP standpoint.

## 2021-07-21 NOTE — PROGRESS NOTES
[address]   Electrophysiology Progress Note   Date: 7/21/2021  Admit Date: 7/17/2021     Reason for follow up: Symptomatic Bradycardia    Chief Complaint:   Chief Complaint   Patient presents with    Fall     pt brought in by [address] squad from home after passing out at home. pt with hx of low BP and dementia. pt alert and oriented on arrival to department. History of Present Illness: History obtained from patient and medical record. Tom Jones is a 78 y.o. 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 Found to be in Mobitz II on telemetry. 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 On 7/20 he had episode while working with PT of unresponsiveness and then recovered to baseline. Interval Hx: Today, he is being seen for follow up. Developed fever overnight. No recurrent syncope today. No new complaints today. No major events overnight. Denies having chest pain, palpitations, shortness of breath, orthopnea or dizziness. Admits to chills. Patient seen and examined. Clinical notes reviewed. Telemetry reviewed. Problem List:   Patient Active Problem List    Diagnosis Date Noted    Pacemaker 07/20/2021    Hyponatremia     Mobitz II     NAMRATA (acute kidney injury) ([facility] 07/17/2021    Syncope and collapse 03/30/2021      Assessment and Plan:  Recurrent Syncope   - No recurrence today   - S/p PPM   - Recurrent today while working with PT post PPM    ~ Repeat device interrogation revealed no recorded episodes, he was not on telemetry at the time of the event   - Diltiazem stopped   - Stress testing can be considered when acute issues resolved, no s/s of ACS  Fever   - T max 102;  WBC count normal   - Cx sent per primary team   - Will give 24 hours of prophylactic abx from a device standpoint   Mobitz II   - S/p dual chamber PPM 7/19/2021   - Increased swelling to left upper chest, recommend holding Eliquis for 1-2 days and monitor site, discussed with primary team  Hypertension   - Resume diltiazem or home medications as tolerated   - Lenient BP control in setting of episode of recurrent syncope  DVT    - Resume Eliquis when medically able    - Will give round of IV abx for device prophylaxis given fever  - Stress testing can be considered when acute issues resolved and can be completed as OP if no signs of ACS   - Hold Eliquis due to swelling at site and Ice therapy 4 x daily     Post-Device Implant   1. Wound Care  -Bathe daily but keep incision dry and clean until your 7-10 day follow up  -Do not shower until you are told to do so by your physician.  -Do not remove the outer dressing unless it is soiled  -Allow the thin pieces of tape (Steri-Strips) to fall off naturally. Do not pick or pull at these unless instructed to do so by your physician. 2. Contact the cardiologists office for these concerns:   -Increased swelling and/or tenderness in incision and/or extending down the arm on the same side as implant site   -Feeling increased palpitations or irregular heart beat   -Redness of incision or drainage from incision.   -Bleeding that does not stop or increases.  -Skin pimples along incision.  -If a suture works its way through the incision.  -Fever greater than 100.5    3. Do not rub or twist the device site    4. Avoid lifting objects heavier than 10 pounds for one month. Do not raise the arm on the side where the device was implanted over your head for one month. These rules help to heal the implant site and stabilize the heart lead wires. 5. Avoid tight clothing over the incision. 6. No driving for one week or until initial visit after your procedure. 7. Carry your temporary Device  I.D. Card with you until your permanent card arrives in four to six weeks. An I. D. Card should be with you always.     8. If you have a defibrillator (AICD) and receive a shock or hear a tone from the device call the doctor's office    9. An implanted device does not replace any medications, diet or activity restrictions that you had before the implant. Check with your cardiologist regarding questions    10. Reasons to seek medical attention immediately: Call 911   -Sudden onset of chest pain, shortness of breath, dizziness, or loss of balance or coordination   -Sudden Change in vision   -Sudden confusion or trouble speaking and/or understanding    -Sudden onset of numbness or weakness of face/arm/leg, especially on one side of the body   -Sudden or severe headache without known cause   -Nausea with uncontrolled vomiting   -Severe bleeding    Activity: Activity as tolerated, no lifting over 10 lbs for 1 week  Diet: cardiac diet  Wound Care: Keep CDI, no lotions/ointments/powders- No baths x 1 week    F/U:   1. Follow-up with EP NP 3 months  2. Follow up with device clinic 1 week  -Call [facility] at [phone] with any questions    All pertinent information and plan of care discussed with the EP physician. Multiple medical conditions with risk of decompensation. Allergies: Allergies   Allergen Reactions    Keflex [Cephalexin] Hives     Home Meds:  Prior to Visit Medications    Medication Sig Taking?  Authorizing Provider   dilTIAZem (TIAZAC) 120 MG extended release capsule Take 3 capsules by mouth daily Yes Belkis Choudhury MD   trospium (SANCTURA) 20 MG tablet Take 20 mg by mouth 2 times daily Yes Historical Provider, MD   vitamin D (CHOLECALCIFEROL) 25 MCG (1000 UT) TABS tablet Take 1,000 Units by mouth daily Yes Historical Provider, MD   oxybutynin (DITROPAN-XL) 5 MG extended release tablet Take 5 mg by mouth daily Yes Historical Provider, MD   tamsulosin (FLOMAX) 0.4 MG capsule Take 0.4 mg by mouth nightly Yes Historical Provider, MD   rosuvastatin (CRESTOR) 20 MG tablet Take 10 mg by mouth daily  Yes Historical Provider, MD   apixaban (ELIQUIS) 5 MG TABS tablet Take 5 mg by mouth 2 distress, and appears well nourished  · Skin: Warm and pink; no cyanosis, bruising, or clubbing + Incision left upper chest CDI, swelling present and marked+ facial abrasions, + warm to touch  · HEENT: Symmetric and normocephalic. Conjunctiva pink with clear sclera. Mucus membranes pink and moist.   · Cardiovascular: regular rate and rhythm. S1 & S2, negative for murmurs. Peripheral pulses 2+, capillary refill < 3 seconds. negative elevation of JVP. No edema  · Respiratory: Respirations symmetric and unlabored. Lungs clear to auscultation bilaterally, no wheezing, crackles, or rhonchi  · Gastrointestinal: Abdomen soft and round. Bowel sounds normoactive in all quadrants. · Musculoskeletal: No focal weakness. · Neurologic/Psych: Awake and alert. Calm affect, appropriate mood. Pertinent labs, diagnostic, device, and imaging results reviewed as a part of this visit    Labs:    BMP:   Recent Labs     21  0532 21  0604 21  013   * 133* 134*   K 4.2 4.3 3.8   CL 97* 98* 102   CO2 20* 23 22   BUN 13 14 12   CREATININE 1.3 1.3 1.1   MG 1.90 2.00  --      Estimated Creatinine Clearance: 43 mL/min (based on SCr of 1.1 mg/dL).    CBC:   Recent Labs     21  0532 21  0604 21  013   WBC 7.5 9.3 9.5   HGB 14.3 13.8 14.0   HCT 40.3* 39.1* 39.7*   MCV 91.9 92.8 93.3    147 120*     Thyroid: No results found for: TSH, M8YJZAR, P0BQPFN, THYROIDAB  Lipids: No results found for: CHOL, HDL, TRIG  LFTS:   Lab Results   Component Value Date    ALT 17 2021    AST 36 2021    ALKPHOS 74 2021    PROT 6.8 2021    AGRATIO 0.8 2021    BILITOT 0.7 2021     Cardiac Enzymes:   Lab Results   Component Value Date    TROPONINI <0.01 2021    TROPONINI <0.01 2021    TROPONINI <0.01 2021     Coags:   Lab Results   Component Value Date    PROTIME 19.4 2021    INR 1.66 2021     EC2021: SR 1st degree AVB    ECHO: 3/31/2021 Summary   Normal left ventricle size, wall thickness, and systolic function with an   estimated ejection fraction of 60-65%. No regional wall motion abnormalities are seen. Normal diastolic filling pattern for age. Mild to Moderate aortic regurgitation. Mild tricuspid regurgitation. Stress Test: none    Cath:none    All questions and concerns were addressed to the patient. Alternatives to my treatment were discussed. I have discussed the above stated plan with patient and the nurse. The patient verbalized understanding and agreed with the plan. Thank you for allowing to us to participate in the care of Yara Spencer.     GLENDY Ford-CNP  [address]   Office: [phone]

## 2021-07-22 NOTE — PROGRESS NOTES
the event   - Diltiazem stopped   - Stress testing today, discussed with Dr. Shaina Tesfaye and Dr. Rupert Whiteside   Fever   - Low grade today; WBC count normal   - Cx sent per primary team    - Plans for ID consult per nursing  Grant II   - S/p dual chamber PPM 7/19/2021    ~ Left upper chest CDI, no hematoma, mild swelling    ~ Low-grade fever, no s/s of infection at the site, can consider Keflex if any other s/s of infection develop  Hypertension   - Uncontrolled today   - Consider resuming diltiazem 120 mg daily for BP control, lenient control due to recurrent syncope  DVT    - Resume Eliquis when medically able  NAMRATA   - He received one dose of vancomycin yesterday, no further doses recommended   - Per primary team    - Stress test likely today  - Eliquis can be resumed from EP standpoint when otherwise medically able for DVT prophylaxis  - EP will follow peripherally, please call with questions    Post-Device Implant   1. Wound Care  -Bathe daily but keep incision dry and clean until your 7-10 day follow up  -Do not shower until you are told to do so by your physician.  -Do not remove the outer dressing unless it is soiled  -Allow the thin pieces of tape (Steri-Strips) to fall off naturally. Do not pick or pull at these unless instructed to do so by your physician. 2. Contact the cardiologists office for these concerns:   -Increased swelling and/or tenderness in incision and/or extending down the arm on the same side as implant site   -Feeling increased palpitations or irregular heart beat   -Redness of incision or drainage from incision.   -Bleeding that does not stop or increases.  -Skin pimples along incision.  -If a suture works its way through the incision.  -Fever greater than 100.5    3. Do not rub or twist the device site    4. Avoid lifting objects heavier than 10 pounds for one month. Do not raise the arm on the side where the device was implanted over your head for one month.  These rules help to heal the implant site and stabilize the heart lead wires. 5. Avoid tight clothing over the incision. 6. No driving for one week or until initial visit after your procedure. 7. Carry your temporary Device  I.D. Card with you until your permanent card arrives in four to six weeks. An I. D. Card should be with you always. 8. If you have a defibrillator (AICD) and receive a shock or hear a tone from the device call the doctor's office    9. An implanted device does not replace any medications, diet or activity restrictions that you had before the implant. Check with your cardiologist regarding questions    10. Reasons to seek medical attention immediately: Call 911   -Sudden onset of chest pain, shortness of breath, dizziness, or loss of balance or coordination   -Sudden Change in vision   -Sudden confusion or trouble speaking and/or understanding    -Sudden onset of numbness or weakness of face/arm/leg, especially on one side of the body   -Sudden or severe headache without known cause   -Nausea with uncontrolled vomiting   -Severe bleeding    Activity: Activity as tolerated, no lifting over 10 lbs for 1 week  Diet: cardiac diet  Wound Care: Keep CDI, no lotions/ointments/powders- No baths x 1 week    F/U:   1. Follow-up with EP NP 3 months  2. Follow up with device clinic 1 week  -Call Corey Eldridge at [phone] with any questions    All pertinent information and plan of care discussed with the EP physician. Multiple medical conditions with risk of decompensation. Allergies: Allergies   Allergen Reactions    Keflex [Cephalexin] Hives     Home Meds:  Prior to Visit Medications    Medication Sig Taking?  Authorizing Provider   dilTIAZem (TIAZAC) 120 MG extended release capsule Take 3 capsules by mouth daily Yes Kisha Molina MD   trospium (SANCTURA) 20 MG tablet Take 20 mg by mouth 2 times daily Yes Historical Provider, MD   vitamin D (CHOLECALCIFEROL) 25 MCG (1000 UT) TABS tablet Take 1,000 Units by mouth daily Yes Historical Provider, MD   oxybutynin (DITROPAN-XL) 5 MG extended release tablet Take 5 mg by mouth daily Yes Historical Provider, MD   tamsulosin (FLOMAX) 0.4 MG capsule Take 0.4 mg by mouth nightly Yes Historical Provider, MD   rosuvastatin (CRESTOR) 20 MG tablet Take 10 mg by mouth daily  Yes Historical Provider, MD   apixaban (ELIQUIS) 5 MG TABS tablet Take 5 mg by mouth 2 times daily Yes Historical Provider, MD   fluticasone (FLONASE) 50 MCG/ACT nasal spray 1 spray by Nasal route daily  Historical Provider, MD      Scheduled Meds:   sodium chloride flush  5-40 mL Intravenous 2 times per day    rosuvastatin  10 mg Oral Nightly    trospium  20 mg Oral BID     Continuous Infusions:   sodium chloride 100 mL/hr at 07/22/21 0032    sodium chloride       PRN Meds:technetium tetrofosmin, regadenoson, sodium chloride flush, sodium chloride, ondansetron **OR** ondansetron, polyethylene glycol, acetaminophen **OR** acetaminophen   Past Medical History:  Past Medical History:   Diagnosis Date    Arthritis     Brain aneurysm     D[facility]     Enlarged prostate     Essential hypertension     Foot pain 03/30/2021    complaints of foot pain today    History of intracranial hemorrhage     Overactive bladder     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 Social History:  Reviewed. reports that he has never smoked. He has never used smokeless tobacco. He reports that he does not drink alcohol and does not use drugs. Family History:  Reviewed. family history includes Diabetes in his sister.    Denies family history of sudden cardiac death, arrhythmia, premature CAD    Review of Systems:  Limited due to baseline dementia and lethargy    Physical Examination:  Vitals:    07/22/21 0845   BP: (!) 161/88   Pulse: 101   Resp: 16   Temp: 100.1 °F (37.8 °C)   SpO2: 95%      In: 2021    ALKPHOS 74 2021    PROT 6.8 2021    AGRATIO 0.8 2021    BILITOT 0.7 2021     Cardiac Enzymes:   Lab Results   Component Value Date    TROPONINI <0.01 2021    TROPONINI <0.01 2021    TROPONINI <0.01 2021     Coags:   Lab Results   Component Value Date    PROTIME 19.4 2021    INR 1.66 2021     EC2021: SR 1st degree AVB    ECHO: 3/31/2021    Summary   Normal left ventricle size, wall thickness, and systolic function with an   estimated ejection fraction of 60-65%. No regional wall motion abnormalities are seen. Normal diastolic filling pattern for age. Mild to Moderate aortic regurgitation. Mild tricuspid regurgitation. Stress Test: none    Cath:none    All questions and concerns were addressed to the patient. Alternatives to my treatment were discussed. I have discussed the above stated plan with patient and the nurse. The patient verbalized understanding and agreed with the plan. Thank you for allowing to us to participate in the care of Radha Blancas.     GLENDY De-CNP  [address]   Office: [phone]

## 2021-07-22 NOTE — CARE COORDINATION
The writer spoke with Admission person at [employer] , the Precerts expires on 2/99/42. If patient is not d/c today a new precert will have to be initiated.

## 2021-07-22 NOTE — PLAN OF CARE
Problem: Falls - Risk of:  Goal: Will remain free from falls  Description: Will remain free from falls  Outcome: Ongoing     Problem: Fluid Volume:  Goal: Ability to achieve a balanced intake and output will improve  Description: Ability to achieve a balanced intake and output will improve  Outcome: Ongoing     Problem: Cardiovascular  Goal: Hemodynamic stability  Outcome: Ongoing     Problem: Skin Integrity:  Goal: Will show no infection signs and symptoms  Description: Will show no infection signs and symptoms  Outcome: Ongoing  Goal: Absence of new skin breakdown  Description: Absence of new skin breakdown  Outcome: Ongoing     Problem: Musculor/Skeletal Functional Status  Goal: Highest potential functional level  Outcome: Ongoing  Goal: Absence of falls  Outcome: Ongoing

## 2021-07-22 NOTE — PROGRESS NOTES
Attempted to call daughterJames to updated on plan of care. Daughter unavailable at this time. Left message with callback number if she would like an updated.

## 2021-07-22 NOTE — CONSULTS
Infectious Diseases   Consult Note        Admission Date: 7/17/2021  Hospital Day: Hospital Day: 6   Attending: Arcelia Magallanes MD  Date of service: 7/22/21     Reason for admission: NAMRATA (acute kidney injury) [facility]) [N17.9]    Chief complaint/ Reason for consult: High fever    Microbiology:        I have reviewed allavailable micro lab data and cultures    · Blood culture (2/2) - collected on 7/21/2021: In process      Antibiotics and immunizations:       Current antibiotics: All antibiotics and their doses were reviewed by me    Recent Abx Admin      No antibiotic orders with administrations found. Immunization History: All immunization history was reviewed by me today. There is no immunization history on file for this patient. Known drug allergies: All allergies were reviewed and updated    Allergies   Allergen Reactions    Keflex [Cephalexin] Hives       Social history:     Social History:  All social andepidemiologic history was reviewed and updated by me today as needed. · Tobacco use:   reports that he has never smoked. He has never used smokeless tobacco.  · Alcohol use:   reports no history of alcohol use. · Currently lives in: [facility] [address]  ·  reports no history of drug use. COVID VACCINATION AND LAB RESULT RECORDS:     Internal Administration   First Dose      Second Dose           Last COVID Lab SARS-CoV-2, NAAT (no units)   Date Value   07/21/2021 Not Detected            Assessment:     The patient is a 78 y.o. old male who  has a past medical history of Arthritis, Brain aneurysm, Dementia ([address] Enlarged prostate, Essential hypertension, Foot pain (03/30/2021), History of intracranial hemorrhage, Overactive bladder, Pituitary mass ([address] and Right leg DVT ([address]  with following problems:    · High fever of 101 .9 on 7/20/2021 and 100.1 on 7/22/2021, source unclear, rule out aspiration  · Syncopal episode and fall at home before admission  · Chronic pituitary mass  · S/p dual-chamber pacemaker placement on 9/19/2021 for Mobitz type II block  · History of brain aneurysm  · Essential hypertension  · History of intracranial hemorrhage  · History of right leg DVT  · Lytic lesions at T9 level on CT      Discussion:      The patient had a CT scan of head and cervical spine without contrast done on 7/17/2021 which showed a small left forehead contusion and sella turcica mass infiltrating into the sphenoid region    The patient was found to have Mobitz type II block on telemetry and had a dual-chamber pacemaker done on 7/19/2021    He had a chest x-ray done yesterday. It showed bilateral lower lobe atelectasis versus infiltrates. Given patient's encephalopathy and dementia, I am concerned about the possibility of aspiration pneumonia    Also has some swelling in the pacemaker site, no obvious discharge at this time    Procalcitonin level of 0.13 yesterday. COVID-19 NAAT test was done yesterday and was negative. Urinalysis done yesterday was negative for UTI. Plan:     Diagnostic Workup:    · Agree with blood cultures  · Repeat a portable chest x-ray tomorrow follow-up on bibasilar infiltrates  · Continue to follow fever curve, WBC count and blood cultures  · Follow up on liverand renal functions closely  · Nasal MRSA probe  · Order urine Legionella and pneumococcal antigens      Antimicrobials:    · Patient received 1 dose of empiric IV vancomycin 1 g today  · Keep on watching pacemaker by site for any discharge  · If the patient spikes more fevers, start IV Unasyn 3 g every 8 hours to cover for aspiration  · Pressure ulcer prevention precautions  · Recommend a speech evaluation  · We will follow up on the culture results and clinical progress and will make further recommendations accordingly. · Continue close vitals monitoring. · Maintain good glycemic control. · Fall precautions. Aspiration precautions. · Continue to watch for new fever or diarrhea.   · DVT prophylaxis. · Discussed all above with  RN at the bedside. Drug Monitoring:    · Continue serial monitoring for antibiotic toxicity as follows: CBC, CMP  · Continue to watch for following: new or worsening fever, hypotension, hives, lip swelling and redness or purulence at vascular access sites. I/v access Management:    · Continue to monitor i.v access sites for erythema, induration, discharge or tenderness. · As always, continue efforts to minimizetubes/lines/drains as clinically appropriate to reduce chances of line associated infections. Current isolation precautions: There are no current isolations documented for this patient. Level of complexity of consult: High     Risk of Complications/Morbidity: High     · Illness(es)/ Infection present that pose threat to life/bodily function. · There is potential for severe exacerbation of infection/side effects of treatment. · Therapy requires intensive monitoring for antimicrobial agent toxicity. Thank you for involving me in the care of your patient. I will continue to follow. If you have any additional questions, please do not hesitate to contact me. Subjective:     Presenting complaint in ER:     Chief Complaint   Patient presents with    Fall     pt brought in by [facility] from home after passing out at home. pt with hx of low BP and dementia. pt alert and oriented on arrival to department. HPI: Tarun Melissa is a 78 y.o. male patient, who was seen at the request of Dr. Giuseppe Mora MD.    History was obtained from chart review as the patient has dementia and also is encephalopathic and nonverbal    The patient was admitted on 7/17/2021. I have been consulted to see the patient for above mentioned reason(s). The patient has multiple medical comorbidities, and presented to the ER for head injury and a syncopal episode.     The patient has history of dementia and reportedly fell wobbly when she was walking and fell face forward and unfortunately hit her head. The patient was admitted. He was initially afebrile. He spiked a fever up to 101.9 on 7/20/2021. Blood cultures were not done that night. The patient had a low-grade fever 99.6 earlier today. He was given 1 g of IV vancomycin empirically today. I have been asked for my opinion for management for this patient. Past Medical History: All past medical history reviewed today. Past Medical History:   Diagnosis Date    Arthritis     Brain aneurysm     Dementia ([facility] [address]     Enlarged prostate     Essential hypertension     Foot pain 03/30/2021    complaints of foot pain today    History of intracranial hemorrhage     Overactive bladder     Pituitary mass (HCC)     Right leg DVT (HCC)          Past Surgical History: All pastsurgical history was reviewed today. Past Surgical History:   Procedure Laterality Date    APPENDECTOMY  63    MOUTH SURGERY  2020    molars removed         Family History: All family history was reviewed today. Problem Relation Age of Onset    Diabetes Sister          Medications: All current and past medications were reviewed.     Medications Prior to Admission: trospium (SANCTURA) 20 MG tablet, Take 20 mg by mouth 2 times daily  vitamin D (CHOLECALCIFEROL) 25 MCG (1000 UT) TABS tablet, Take 1,000 Units by mouth daily  oxybutynin (DITROPAN-XL) 5 MG extended release tablet, Take 5 mg by mouth daily  tamsulosin (FLOMAX) 0.4 MG capsule, Take 0.4 mg by mouth nightly  rosuvastatin (CRESTOR) 20 MG tablet, Take 10 mg by mouth daily   apixaban (ELIQUIS) 5 MG TABS tablet, Take 5 mg by mouth 2 times daily  fluticasone (FLONASE) 50 MCG/ACT nasal spray, 1 spray by Nasal route daily     apixaban  5 mg Oral BID    sodium chloride flush  5-40 mL Intravenous 2 times per day    rosuvastatin  10 mg Oral Nightly    trospium  20 mg Oral BID          REVIEW OF SYSTEMS:       Review of Systems   Unable to perform ROS: Dementia Objective:       PHYSICAL EXAM:      Vitals:   Vitals:    07/22/21 0515 07/22/21 0845 07/22/21 1230 07/22/21 1645   BP:  (!) 161/88 (!) 162/86 132/69   Pulse:  101 116 68   Resp:  16 16 16   Temp:  100.1 °F (37.8 °C) 99.6 °F (37.6 °C) 98.1 °F (36.7 °C)   TempSrc:  Oral Oral Oral   SpO2:  95% 95% 93%   Weight: 147 lb 7.8 oz (66.9 kg)      Height:           Physical Exam      General: Encephalopathic but protecting the airway so far,   HEENT: normocephalic, atraumatic, sclera clear, pupils equal, light reflex preserved bilaterally  Cardiovascular: RRR, no murmurs/rubs/gallops detected  Pulmonary: Bibasilar crackles noted  Abdomen/GI: soft, no organomegaly, bowel sounds positive  Neuro: Encephalopathic, pupils are equal and reactive to light, moves all extremities  Skin: no rash, some swelling at the pacemaker site, no obvious discharge. Steri-Strips in place. Musculoskeletal:  No obvious joint swelling, redness. No limitation of range of passive motion  Genitourinary: Douglas's catheter in place   Psych: could not assess   Lymphatic/Immunologic: No obvious bruising, no cervical lymphadenopathy    Lines: All vascular access sites are healthy with no local erythema, discharge or tenderness    Intake and output:     I/O last 3 completed shifts: In: 349.6 [P.O.:120; I.V.:229.6]  Out: 700 [Urine:700]    Lab Data:   All available labs were reviewed by me today.      CBC:   Recent Labs     07/20/21  0604 07/21/21 0137 07/22/21  0521   WBC 9.3 9.5 6.9   RBC 4.22 4.25 4.17*   HGB 13.8 14.0 13.4*   HCT 39.1* 39.7* 39.1*    120* 112*   MCV 92.8 93.3 93.9   MCH 32.7 32.8 32.1   MCHC 35.2 35.2 34.2   RDW 13.4 13.5 13.7        BMP:  Recent Labs     07/20/21  0604 07/21/21 0137 07/22/21  0521   * 134* 138   K 4.3 3.8 4.1   CL 98* 102 105   CO2 23 22 23   BUN 14 12 14   CREATININE 1.3 1.1 1.4*   CALCIUM 9.2 8.9 8.8   GLUCOSE 68* 83 67*        Hepatic FunctionPanel:   Lab Results   Component Value Date    ALKPHOS 74 07/21/2021    ALT 17 07/21/2021    AST 36 07/21/2021    PROT 6.8 07/21/2021    BILITOT 0.7 07/21/2021    LABALBU 3.1 07/21/2021       CPK: No results found for: CKTOTAL  ESR: No results found for: SEDRATE  CRP: No results found for: CRP      Imaging: All pertinent images and reports for the current visit were reviewed by meduring this visit. XR ABDOMEN (KUB) (SINGLE AP VIEW)   Final Result   Slowly resolving constipation with decreasing bowel dilatation which could   represent a resolving partial obstruction or ileus. NM MYOCARDIAL SPECT REST EXERCISE OR RX   Final Result      XR ABDOMEN (KUB) (SINGLE AP VIEW)   Final Result   Large amount of stool in the colon suggests constipation. Dilated air-filled   loops of small bowel and colon in association. Fecal impaction may be   considered clinically. CT CHEST WO CONTRAST   Final Result   De pendant opacity and bandlike opacities are seen at the lung bases, with   atelectasis favored over pneumonia. Trace pleural effusions are seen. Lytic and sclerotic appearing T9 vertebral body. Some certain measuring not   this represents a typical hemangioma common etiology such as Paget's disease,   or metastatic disease. Consider further characterization with bone scan. CT HEAD WO CONTRAST   Final Result   No acute intracranial abnormality. 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 Enlarging left frontal scalp hematoma. XR CHEST PORTABLE   Final Result   Interval development of bibasilar airspace disease, atelectasis favored over   pneumonia. XR CHEST PORTABLE   Final Result   No acute cardiopulmonary disease. No left-sided pneumothorax following pacer   placement. MRI BRAIN WO CONTRAST   Final Result   1. Left frontal scalp hematoma/edema. No evidence of acute intracranial   hemorrhage.    2. Again seen heterogeneous sellar/suprasellar with mass effect on the optic chiasm and extension into the left sphenoid sinus. Evaluation is limited   secondary to lack of intravenous contrast (acute kidney injury). 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 3. Involution parenchymal changes with moderate microvascular ischemic   disease. 4. No evidence of acute infarct or midline shift. CT FACIAL BONES WO CONTRAST   Final Result   No acute traumatic injury of the facial bones. CT Cervical Spine WO Contrast   Final Result   No acute abnormality of the cervical spine. Multilevel degenerative disc disease with mild multilevel facet arthropathy. CT Head WO Contrast   Final Result   1. No acute intracranial abnormality. 2. Small left forehead contusion/scalp hematoma. 3. 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 4. Senescent changes. XR CHEST PORTABLE   Final Result   No radiographic evidence of acute cardiopulmonary disease. Outside records:    Labs, Microbiology, Radiology and pertinent results from Care everywhere, if available, were reviewed as a part ofthe consultation. Problem list:       Patient Active Problem List   Diagnosis Code    Syncope and collapse R55    Acute kidney injury superimposed on CKD ([facility] N17.9, N18.9    Hyponatremia E87.1    Mobitz II I44.1    Pacemaker Z95.0    CKD (chronic kidney disease) stage 3, GFR 30-59 ml/min (HCC) N18.30    HTN (hypertension) I10    Fever R50.9    Fecal impaction ([facility]) K56.41    Dementia ([facility] [address] F03.90    History of DVT (deep vein thrombosis) Z86.718    Atelectasis J98.11         Please note that this chart was generated using Dragon dictation software. Although every effort was made to ensure the accuracy of this automated transcription, some errors in transcription may have occurred inadvertently.  If you may need any

## 2021-07-22 NOTE — PROGRESS NOTES
Hospitalist Progress Note      PCP: Corrinne Lab, MD    Date of Admission: 7/17/2021    Chief Complaint: [facility] Course: 77 yo M with history of brain aneurysm, sellar mass, h/o DVT on Eliquis, dementia, CKD 3 who was brought to ER for syncope and fall. In ED, found to have NAMRATA on CKD and scalp hematoma. Admitted as inpatient for further management. Noted to be in Mobitz II, s/p PPM on 7/19/21. SPECT requested per Cardio to r/o CAD. Developed fevers on evening of 7/20 and has more swelling in L PPM pocket on 7/21/21; Eliquis held on 7/21/21. Noted to have atelectasis and fecal impaction. Molasses enema ordered on 7/21. Will go to [facility] upon DC. SPECT negative on 7/22. Subjective:   Patient had low grade temps this morning. Awake. No CP, HA or abdominal pain. Had many BMs. Slower to arouse but answers properly after       Medications:  Reviewed    Infusion Medications    sodium chloride Stopped (07/22/21 0900)    sodium chloride       Scheduled Medications    sodium chloride flush  5-40 mL Intravenous 2 times per day    rosuvastatin  10 mg Oral Nightly    trospium  20 mg Oral BID     PRN Meds: sodium chloride flush, sodium chloride, ondansetron **OR** ondansetron, polyethylene glycol, acetaminophen **OR** acetaminophen      Intake/Output Summary (Last 24 hours) at 7/22/2021 1654  Last data filed at 7/22/2021 0900  Gross per 24 hour   Intake --   Output 700 ml   Net -700 ml       Physical Exam Performed:    /69   Pulse 68   Temp 98.1 °F (36.7 °C) (Oral)   Resp 16   Ht 5' 4\" (1.626 m)   Wt 147 lb 7.8 oz (66.9 kg)   SpO2 93%   BMI 25.32 kg/m²     General appearance: No apparent distress, appears stated age and cooperative. HEENT: Pupils equal, round, and reactive to light. Conjunctivae/corneas clear. Neck: Supple, with full range of motion. No jugular venous distention. Trachea midline. Respiratory:  Normal respiratory effort.  Clear to auscultation, bilaterally without Rales/Wheezes/Rhonchi. Cardiovascular: Regular rate and rhythm with normal S1/S2 without murmurs, rubs or gallops. L PPM site is swollen  Abdomen: Soft, non-tender, mildly distended with normal bowel sounds. Musculoskeletal: No clubbing, cyanosis or edema bilaterally. Full range of motion without deformity. Skin: Skin color, texture, turgor normal.  No rashes or lesions. Neurologic:  Neurovascularly intact without any focal sensory/motor deficits. Cranial nerves: II-XII intact, grossly non-focal.  Psychiatric: Alert and oriented, thought content appropriate, normal insight  Capillary Refill: Brisk,3 seconds, normal   Peripheral Pulses: +2 palpable, equal bilaterally       Labs:   Recent Labs     07/20/21  0604 07/21/21  0137 07/22/21  0521   WBC 9.3 9.5 6.9   HGB 13.8 14.0 13.4*   HCT 39.1* 39.7* 39.1*    120* 112*     Recent Labs     07/20/21  0604 07/21/21 0137 07/22/21  0521   * 134* 138   K 4.3 3.8 4.1   CL 98* 102 105   CO2 23 22 23   BUN 14 12 14   CREATININE 1.3 1.1 1.4*   CALCIUM 9.2 8.9 8.8     Recent Labs     07/21/21 0137   AST 36   ALT 17   BILITOT 0.7   ALKPHOS 74     No results for input(s): INR in the last 72 hours. No results for input(s): Austyn Altes in the last 72 hours. Urinalysis:      Lab Results   Component Value Date    NITRU Negative 07/21/2021    WBCUA 1 07/21/2021    RBCUA 3 07/21/2021    BLOODU Negative 07/21/2021    SPECGRAV 1.015 07/21/2021    GLUCOSEU Negative 07/21/2021       Radiology:  NM MYOCARDIAL SPECT REST EXERCISE OR RX   Final Result      XR ABDOMEN (KUB) (SINGLE AP VIEW)   Final Result   Large amount of stool in the colon suggests constipation. Dilated air-filled   loops of small bowel and colon in association. Fecal impaction may be   considered clinically. CT CHEST WO CONTRAST   Final Result   De pendant opacity and bandlike opacities are seen at the lung bases, with   atelectasis favored over pneumonia.   Trace pleural effusions are seen. Lytic and sclerotic appearing T9 vertebral body. Some certain measuring not   this represents a typical hemangioma common etiology such as Paget's disease,   or metastatic disease. Consider further characterization with bone scan. CT HEAD WO CONTRAST   Final Result   No acute intracranial abnormality. 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 Enlarging left frontal scalp hematoma. XR CHEST PORTABLE   Final Result   Interval development of bibasilar airspace disease, atelectasis favored over   pneumonia. XR CHEST PORTABLE   Final Result   No acute cardiopulmonary disease. No left-sided pneumothorax following pacer   placement. MRI BRAIN WO CONTRAST   Final Result   1. Left frontal scalp hematoma/edema. No evidence of acute intracranial   hemorrhage. 2. Again seen heterogeneous sellar/suprasellar with mass effect on the optic   chiasm and extension into the left sphenoid sinus. Evaluation is limited   secondary to lack of intravenous contrast (acute kidney injury). 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 3. Involution parenchymal changes with moderate microvascular ischemic   disease. 4. No evidence of acute infarct or midline shift. CT FACIAL BONES WO CONTRAST   Final Result   No acute traumatic injury of the facial bones. CT Cervical Spine WO Contrast   Final Result   No acute abnormality of the cervical spine. Multilevel degenerative disc disease with mild multilevel facet arthropathy. CT Head WO Contrast   Final Result   1. No acute intracranial abnormality. 2. Small left forehead contusion/scalp hematoma. 3. 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 4. Senescent changes.          XR CHEST PORTABLE   Final Result   No radiographic evidence of acute cardiopulmonary disease. XR ABDOMEN (KUB) (SINGLE AP VIEW)    (Results Pending)           Assessment/Plan:    Active Hospital Problems    Diagnosis     CKD (chronic kidney disease) stage 3, GFR 30-59 ml/min (HCC) [N18.30]     HTN (hypertension) [I10]     Fever [R50.9]     Fecal impaction (HCC) [K56.41]     Dementia ([facility] [address] [F03.90]     History of DVT (deep vein thrombosis) [Z86.718]     Atelectasis [J98.11]     Pacemaker [Z95.0]     Hyponatremia [E87.1]     Mobitz II [I44.1]     Acute kidney injury superimposed on CKD ([facility] [address] [N17.9, N18.9]     Syncope and collapse [R55]      1. ID consult for fevers  2. Resume Eliquis  3. Neuro consult for AMS  4. NO NARCOTICS OR NEUROSEDATING MEDS  5. PT/OT eval  6. Stopped Ditropan and Flomax given recurrent orthostasis  7. Neurosurgery consult for sellar mass; ? Related to orthostasis/fevers/arrhythmias  8. Repeat KUB for constipation  9. SPECT negative    DVT Prophylaxis: Eliquis  Diet: ADULT DIET; Regular  Code Status: Full Code    PT/OT Eval Status: Following    Dispo - [facility]    Discussed with patient, Ralf Venegas (EP NP), nursing and CM. Not safe to DC to SNF today. We lose pre-cert today; but his other issues merit inpatient evaluation to ensure safe transitions of care.     Dimitris Linton MD

## 2021-07-22 NOTE — PLAN OF CARE
Problem: Falls - Risk of:  Goal: Will remain free from falls  Description: Will remain free from falls  7/22/2021 0108 by Will Verdin RN  Outcome: Ongoing     Problem: Falls - Risk of:  Goal: Absence of physical injury  Description: Absence of physical injury  Outcome: Ongoing     Problem: Pain:  Goal: Pain level will decrease  Description: Pain level will decrease  Outcome: Ongoing     Problem: Pain:  Goal: Control of acute pain  Description: Control of acute pain  Outcome: Ongoing     Problem: Pain:  Goal: Control of chronic pain  Description: Control of chronic pain  Outcome: Ongoing     Problem: Fluid Volume:  Goal: Ability to achieve a balanced intake and output will improve  Description: Ability to achieve a balanced intake and output will improve  7/22/2021 1212 by Ama Galdamez RN  Outcome: Ongoing     Problem: Physical Regulation:  Goal: Ability to maintain clinical measurements within normal limits will improve  Description: Ability to maintain clinical measurements within normal limits will improve  Outcome: Ongoing     Problem: Physical Regulation:  Goal: Will show no signs and symptoms of electrolyte imbalance  Description: Will show no signs and symptoms of electrolyte imbalance  Outcome: Ongoing     Problem: Cardiovascular  Goal: No DVT, peripheral vascular complications  Outcome: Ongoing     Problem: Cardiovascular  Goal: Hemodynamic stability  7/22/2021 1212 by Ama Galdamez RN  Outcome: Ongoing     Problem: Cardiovascular  Goal: Anticoagulate/Hct stable  Outcome: Ongoing     Problem: Cardiovascular  Goal: Agreement to quit smoking  Outcome: Ongoing     Problem: Cardiovascular  Goal: Weight maintained or lost  Outcome: Ongoing     Problem: Cardiovascular  Goal: Understanding of dietary restrictions  Outcome: Ongoing     Problem: Skin Integrity:  Goal: Will show no infection signs and symptoms  Description: Will show no infection signs and symptoms  7/22/2021 1212 by Ama Galdamez RN  Outcome: Ongoing     Problem: Skin Integrity:  Goal: Absence of new skin breakdown  Description: Absence of new skin breakdown  7/22/2021 1212 by Tiny Chand RN  Outcome: Ongoing     Problem: Musculor/Skeletal Functional Status  Goal: Highest potential functional level  7/22/2021 1212 by Tiny Chand RN  Outcome: Ongoing     Problem: Musculor/Skeletal Functional Status  Goal: Absence of falls  7/22/2021 1212 by Tiny Chand RN  Outcome: Ongoing     Problem: Nutrition  Goal: Optimal nutrition therapy  Outcome: Ongoing

## 2021-07-22 NOTE — PROGRESS NOTES
Occupational Therapy  Facility/Department: [facility] 3A NURSING  Daily Treatment Note  NAME: Eileen Perez  : 1940  MRN: [id number]    Date of Service: 2021    Discharge Recommendations:  Eileen Perez scored a 13/24 on the AM-PAC ADL Inpatient form. Current research shows that an AM-PAC score of 17 or less is typically not associated with a discharge to the patient's home setting. Based on the patient's AM-PAC score and their current ADL deficits, it is recommended that the patient have 3-5 sessions per week of Occupational Therapy at d/c to increase the patient's independence. Please see assessment section for further patient specific details. If patient discharges prior to next session this note will serve as a discharge summary. Please see below for the latest assessment towards goals. OT Equipment Recommendations  Equipment Needed: No  Other: Defer to d/c location    Assessment   Performance deficits / Impairments: Decreased functional mobility ; Decreased ADL status; Decreased safe awareness;Decreased balance;Decreased high-level IADLs  Assessment: Pt is an 78 y.o. M with hx of dementia who had a pacemaker placed . Ptis making progress towards his goals, but he  is currently below functional baseline and would benefit from continued acute OT to progress functional outcomes. Treatment Diagnosis: NAMRATA  Prognosis: Fair  OT Education: OT Role;Plan of Care;Precautions;Orientation;Transfer Training;ADL Adaptive Strategies  Patient Education: will require reptition  Barriers to Learning: cognition  REQUIRES OT FOLLOW UP: Yes  Activity Tolerance  Activity Tolerance: Patient limited by fatigue;Treatment limited secondary to medical complications (free text)  Activity Tolerance: Pt's -130 seated on EOB. 61 by end of session. .  Safety Devices  Safety Devices in place: Yes  Type of devices: Left in bed;Call light within reach; Bed alarm in place;Gait belt;Nurse notified; All fall risk precautions in place; Patient at risk for falls         Patient Diagnosis(es): The primary encounter diagnosis was Syncope and collapse. Diagnoses of Hematoma of scalp, initial encounter, Anticoagulated, Brain mass, and Elevated serum creatinine were also pertinent to this visit. has a past medical history of Arthritis, Brain aneurysm, Dementia ([address] Enlarged prostate, Essential hypertension, Foot pain, History of intracranial hemorrhage, Overactive bladder, Pituitary mass ([address] and Right leg DVT ([facility] [address] has a past surgical history that includes Appendectomy (1959) and Mouth surgery (2020). Restrictions  Restrictions/Precautions  Restrictions/Precautions: Cardiac, Fall Risk (Pacemaker, High fall risk)  Required Braces or Orthoses?: Yes (no lifting, no overhead/shoulder flexion overhead x 30 days)  Implants present? : Pacemaker  Position Activity Restriction  Other position/activity restrictions: Marquise Peña is a 78 y.o. male who presents to the emergency department after a syncopal episode with head injury. Patient has history of dementia. Presents here with his daughter who he lives with and is his caregiver. Daughter states that he was coming out of his room using his cane when all of a sudden he became wobbly and then fell face forward and had a syncopal episode. He is anticoagulated on Eliquis with history of DVT in his right leg. Has history of a brain aneurysm and pituitary mass. Patient denies any pain at this time. Daughter states he has been eating less over the past few days. Has some abrasions over his face. Denies visual changes, headache, chest pain, shortness breath, abdominal pain, extremity pain, numbness or difficulty moving his extremities. He is alert to person and place but not time. Daughter states this is normal for him with his history of dementia.   Subjective   General  Chart Reviewed: Yes  Patient assessed for rehabilitation services?: Yes  Additional Pertinent Hx: hx of dementia  Family / Caregiver Present: No  Diagnosis: NAMRATA (acute kidney injury) ([facility] [address]  Subjective  Subjective: Pt supine in bed sleeping on arival. Pt awakened and agreeable to OT eval. Pt denies pain. Pre Treatment Pain Screening  Intervention List: Patient able to continue with treatment  Vital Signs  Patient Currently in Pain: Denies   Orientation  Orientation  Orientation Level: Disoriented to situation;Oriented to place;Oriented to person;Disoriented to time (Stated month is Martell, year is \"04. \")  Objective    ADL  Feeding: Setup;Supervision; Increased time to complete (Very slow with feeding; OT cut pt's food, opened containers.)  Grooming: Setup; Increased time to complete (wash face)  UE Bathing: Setup;Verbal cueing; Increased time to complete;Stand by assistance  LE Bathing: Dependent/Total (buttocks/posterior hygiene only)  UE Dressing: Moderate assistance (gown)  LE Dressing: Moderate assistance;Verbal cueing; Increased time to complete (SBA don/doff socks on EOB; CGA doff pullups on toilet; Max A don pullups)  Toileting: Maximum assistance; Increased time to complete (Assist for clothing mgt up d/t fatigue; D posterior hygiene after BM)        Balance  Sitting Balance: Moderate assistance (Mod A iniitially, progressing to SBA on EOB)  Standing Balance: Contact guard assistance (w/RW)  Standing Balance  Time: ~4 min, ~1 min  Activity: functional mobility to bathroom for clothing mgt & toilet transfer  Functional Mobility  Functional - Mobility Device: Rolling Walker  Activity: To/from bathroom  Assist Level: Contact guard assistance  Functional Mobility Comments: No LOB. Pt denied dizziness. Toilet Transfers  Toilet - Technique: Ambulating  Equipment Used: Standard toilet (low toilet w/grab bar)  Toilet Transfer: Minimal assistance  Toilet Transfers Comments: Mod A sit to stand  X 2 trials d/t pt's Rayma Ates leaked onto floor with first sit to stand.   Bed mobility  Supine to Sit: Moderate assistance (HOB flat)  Sit to Supine: Moderate assistance (HOB flat)  Scooting: Stand by assistance (required increased time to perform)  Transfers  Sit to stand: Moderate assistance (Mod A from toilet X 2 trials; CGA from EOB)  Stand to sit: Minimal assistance (to low toilet X 2 trials, CGA to EOB)                       Cognition  Overall Cognitive Status: Exceptions (Simultaneous filing. User may not have seen previous data.)  Arousal/Alertness: Delayed responses to stimuli (Simultaneous filing. User may not have seen previous data.)  Following Commands: Follows one step commands with repetition; Follows one step commands with increased time (Simultaneous filing. User may not have seen previous data.)  Attention Span: Attends with cues to redirect (Simultaneous filing. User may not have seen previous data.)  Memory: Decreased recall of biographical Information;Decreased recall of precautions;Decreased recall of recent events;Decreased short term memory (Simultaneous filing. User may not have seen previous data.)  Safety Judgement: Decreased awareness of need for assistance;Decreased awareness of need for safety (Simultaneous filing. User may not have seen previous data.)  Problem Solving: Assistance required to generate solutions;Assistance required to implement solutions;Assistance required to identify errors made (Simultaneous filing. User may not have seen previous data.)  Insights: Not aware of deficits (Simultaneous filing. User may not have seen previous data.)  Initiation: Requires cues for some (Simultaneous filing. User may not have seen previous data.)  Sequencing: Requires cues for some (Simultaneous filing.  User may not have seen previous data.)     Perception  Overall Perceptual Status: [facility]                                   Plan   Plan  Times per week: 3-5x  Times per day: Daily  Current Treatment Recommendations: Functional Mobility Training, Balance Training, Safety Education & Training, Self-Care / ADL, Home Management Training, Patient/Caregiver Education & Training, Endurance Training    AM-PAC Score        AM-PAC Inpatient Daily Activity Raw Score: 13 (07/22/21 1626)  AM-PAC Inpatient ADL T-Scale Score : 32.03 (07/22/21 1626)  ADL Inpatient CMS 0-100% Score: 63.03 (07/22/21 1626)  ADL Inpatient CMS G-Code Modifier : CL (07/22/21 1626)    Goals  Short term goals  Time Frame for Short term goals: d/c  Short term goal 1: Pt will complete LBD with CGA-Mod A 7/22  Short term goal 2: Pt will complete toilet transfer with CGA--Min A stand to sit;  Mod A sit to stand /22  Short term goal 3: pt will complete toileting with CGA--Max A 7/22  Short term goal 4: Pt will complete UB bathing with CGA--SBA 7/22--GOAL MET  Short term goal 5: NEW GOAL: SBA for functional mobility to/from bathroom w/RW  Long term goals  Time Frame for Long term goals : STGs=LTGs       Therapy Time   Individual Concurrent Group Co-treatment   Time In       1424   Time Out       1539   Minutes       75         Timed Code Treatment Minutes:  75 min    Total Treatment Minutes:  75 min    C/ Matt 66, OT   Ty Bernstein., OTR/L, [id number]

## 2021-07-22 NOTE — PROGRESS NOTES
Physical Therapy  Facility/Department: [address] NURSING  Daily Treatment Note  NAME: Radha Blancas  : 1940  MRN: [id number]    Date of Service: 2021    Discharge Recommendations: Radha Blancas scored a 15/24 on the AM-PAC short mobility form. Current research shows that an AM-PAC score of 17 or less is typically not associated with a discharge to the patient's home setting. Based on the patient's AM-PAC score and their current functional mobility deficits, it is recommended that the patient have 3-5 sessions per week of Physical Therapy at d/c to increase the patient's independence. Please see assessment section for further patient specific details. If patient discharges prior to next session this note will serve as a discharge summary. Please see below for the latest assessment towards goals. PT Equipment Recommendations  Equipment Needed: No    Assessment   Body structures, Functions, Activity limitations: Decreased functional mobility ; Decreased balance;Decreased cognition;Decreased endurance;Decreased strength;Decreased ADL status  Assessment: Pt able to increase ambulation distance this date with CGA however continues to present with decreased activity tolerance and requires increased time to perform all tasks. Pt continues to require Mod Ax1 for bed mobility and CGA for transfers this date. Pt would benefit from continued skilled PT services to address deficits. Treatment Diagnosis: decreased functional mobility associated wiht NAMRATA and insertion of pacemaker  Prognosis: Fair  Decision Making: Low Complexity  Clinical Presentation: stable  PT Education: PT Role;Transfer Training;General Safety;Orientation;Goals;Plan of Care;Precautions; Energy Conservation;Gait Training;Functional Mobility Training  Patient Education: d/c recommendations--pt verbalized understanding, will require reinforcement  Barriers to Learning: cognitive  REQUIRES PT FOLLOW UP: Yes  Activity Tolerance  Activity of dementia. Subjective   General  Chart Reviewed: Yes  Response To Previous Treatment: Patient with no complaints from previous session. Family / Caregiver Present: No  Subjective  Subjective: Pt noted to be lethargic at beginning and end of session. Pt reporting no pain at this time. General Comment  Comments: Pt supine in bed upon arrival.  Pain Screening  Patient Currently in Pain: Denies  Vital Signs  Patient Currently in Pain: Denies       Orientation  Orientation  Overall Orientation Status: Impaired  Orientation Level: Disoriented to place;Oriented to person;Disoriented to situation;Disoriented to time     Cognition   Cognition  Overall Cognitive Status: Exceptions (Simultaneous filing. User may not have seen previous data.)  Arousal/Alertness: Delayed responses to stimuli (Simultaneous filing. User may not have seen previous data.)  Following Commands: Follows one step commands with repetition; Follows one step commands with increased time (Simultaneous filing. User may not have seen previous data.)  Attention Span: Attends with cues to redirect (Simultaneous filing. User may not have seen previous data.)  Memory: Decreased recall of biographical Information;Decreased recall of precautions;Decreased recall of recent events;Decreased short term memory (Simultaneous filing. User may not have seen previous data.)  Safety Judgement: Decreased awareness of need for assistance;Decreased awareness of need for safety (Simultaneous filing. User may not have seen previous data.)  Problem Solving: Assistance required to generate solutions;Assistance required to implement solutions;Assistance required to identify errors made (Simultaneous filing. User may not have seen previous data.)  Insights: Not aware of deficits (Simultaneous filing. User may not have seen previous data.)  Initiation: Requires cues for some (Simultaneous filing.  User may not have seen previous data.)  Sequencing: Requires cues for some (Simultaneous filing. User may not have seen previous data.)     Objective   Bed mobility  Supine to Sit: Moderate assistance (HOB flat)  Sit to Supine: Moderate assistance (HOB flat)  Scooting: Stand by assistance (required increased time to perform)  Transfers  Sit to Stand: Contact guard assistance (x1 EOB, x2 toilet)  Stand to sit: Minimal Assistance (x2 toilet)  Comment: VC for safe hand placement when transferring with RW  Ambulation  Ambulation?: Yes  Ambulation 1  Surface: level tile  Device: Rolling Walker  Assistance: Contact guard assistance  Quality of Gait: wide Irais, decreased klever, B genu varum noted  Distance: 22'+14'  Comments: Pt required increased time to perform, no LOB. Stairs/Curb  Stairs?: No     Balance  Posture: Fair  Sitting - Static: Good;- (initially mod A required to sit EOB quickly progressing to SBA; SBA seated at toilet for toileting ~10-12 minutes total)  Sitting - Dynamic: Good;- (SBA while performing reaching tasks with appropriate weight shifting and trunk dissociation noted with unilateral UE support noted. Pt sat EOB ~15-20 minutes for ADLs with SBA. SBA while seated at toilet while OT assisting donning/doffing briefs/socks.)  Standing - Static: Fair;+ (CGA standing with RW for UE support ~1-2 minutes total for 2-3 bouts)  Standing - Dynamic: Fair;+ (CGA/Min A for transfers; CGA for ambulation with RW)      Comment: Extended time spent seated at toilet as pt with incontinent episode of urine upon standing from toilet, requiring pt to return seated and change brief and socks. Extended time spent at end of session setting up pt for lunch and OT assisting with feeding.               G-Code     OutComes Score                 AM-PAC Score  AM-PAC Inpatient Mobility Raw Score : 15 (07/22/21 1619)  AM-PAC Inpatient T-Scale Score : 39.45 (07/22/21 1619)  Mobility Inpatient CMS 0-100% Score: 57.7 (07/22/21 1619)  Mobility Inpatient CMS G-Code Modifier : CK (07/22/21 1619) Goals  Short term goals  Time Frame for Short term goals: prior to discharge  Short term goal 1: pt will be able to ambulate with handheld/cane 20ft with CG  Short term goal 2: pt will be able to ambulate flight of stairs with cane and Gavin  Short term goal 3: pt will be able to sit to/from stand independently  Short term goal 4: pt will be able to perform bed mobility independently  Patient Goals   Patient goals : return home  NO GOALS MET THIS DATE    Plan    Plan  Times per week: 3-5  Times per day: Daily  Current Treatment Recommendations: Transfer Training, Endurance Training, Strengthening, ROM, Balance Training, Gait Training, Functional Mobility Training, Cognitive Reorientation, Patient/Caregiver Education & Training, Neuromuscular Re-education, Safety Education & Training, Home Exercise Program  Safety Devices  Type of devices:  All fall risk precautions in place, Bed alarm in place, Patient at risk for falls, Nurse notified, Call light within reach, Gait belt  Restraints  Initially in place: No     Therapy Time   Individual Concurrent Group Co-treatment   Time In       1424   Time Out       1539   Minutes       75        Timed Code Treatment Minutes:  75  Total Treatment Minutes:  [address], [address] [facility], [address] [facility]

## 2021-07-22 NOTE — PROGRESS NOTES
Instructed on Lexiscan Stress Test Procedure including possible side effects/ adverse reactions. Patient verbalizes  understanding and denies having any questions . See [facility] Cardiology             Aminophylline given per lexiscan protocol in stress lab for c/o persistant nausea.

## 2021-07-22 NOTE — PROGRESS NOTES
received callback from daughter Pamela Pickens. Updated on plan of care. Answered all questions. Denies further questions at this time. Family satisfied.

## 2021-07-23 NOTE — PROGRESS NOTES
Infectious Diseases   Progress Note      Admission Date: 7/17/2021  Hospital Day: Hospital Day: 7   Attending: Jean Claude Pichardo MD  Date of service: 7/23/2021     Chief complaint/ Reason for consult:     · High fever of 101 .9 on 7/20/2021 and 100.1 on 7/22/2021, source unclear, rule out aspiration  · Syncopal episode and fall at home before admission  · Chronic pituitary mass  · S/p dual-chamber pacemaker placement on 9/19/2021 for Mobitz type II block  · History of brain aneurysm    Microbiology:        I have reviewed allavailable micro lab data and cultures    Blood culture (2/2) - collected on 7/22/2021: In process    Antibiotics and immunizations:       Current antibiotics: All antibiotics and their doses were reviewed by me    Recent Abx Admin      No antibiotic orders with administrations found. Immunization History: All immunization history was reviewed by me today. There is no immunization history on file for this patient. Known drug allergies: All allergies were reviewed and updated    Allergies   Allergen Reactions    Keflex [Cephalexin] Hives       Social history:     Social History:  All social andepidemiologic history was reviewed and updated by me today as needed. · Tobacco use:   reports that he has never smoked. He has never used smokeless tobacco.  · Alcohol use:   reports no history of alcohol use. · Currently lives in: [facility]. [address]  ·  reports no history of drug use.      COVID VACCINATION AND LAB RESULT RECORDS:     Internal Administration   First Dose      Second Dose           Last COVID Lab SARS-CoV-2, NAAT (no units)   Date Value   07/21/2021 Not Detected            Assessment:     The patient is a 78 y.o. old male who  has a past medical history of Arthritis, Brain aneurysm, Dementia ([facility] [address] Enlarged prostate, Essential hypertension, Foot pain (03/30/2021), History of intracranial hemorrhage, Overactive bladder, Pituitary mass ([address] and Right leg DVT ([facility] [address] with following problems:    · High fever of 101 .9 on 7/20/2021 and 100.1 on 7/22/2021,-he had fever again today  · Syncopal episode and fall at home before admission  · Chronic pituitary mass  · S/p dual-chamber pacemaker placement on 9/19/2021 for Mobitz type II block-blood cultures negative so far from yesterday  · History of brain aneurysm  · Essential hypertension-blood pressure okay  · History of intracranial hemorrhage  · History of right leg DVT  · Lytic lesions at T9 level on CT-we will defer work-up to primary      Discussion:      The patient had a fever 100.6 again today. He received 1 dose of IV vancomycin yesterday. Serum creatinine is 1.1     White cell count is 7000. Urine Legionella and pneumococcal antigens are negative. Repeat chest x-ray done today reviewed. Bandlike atelectasis noted on the right side. Overall aeration better. Plan:     Diagnostic Workup:      · Continue to follow  fever curve, WBC count and blood cultures. · Continue to monitor blood counts, liver and renal function. Antimicrobials:    · Patient is allergic to cephalexin. Hence, cannot start Unasyn. · Will order empiric oral Cipro 500 mg every 12 hour  · Will order empiric p.o. Flagyl 500 mg every 8 hour  · We will see how he does on above antibiotics. · If blood cultures remain negative but fever response to Cipro and Flagyl, can plan for total of 1 week course of oral Cipro and Flagyl empirically  · We will follow up on the culture results and clinical progress and will make further recommendations accordingly. · Continue close vitals monitoring. · Maintain good glycemic control. · Fall precautions. Aspiration precautions. · Continue to watch for new fever or diarrhea. · DVT prophylaxis. · Discussed all above with patient and RN.       Drug Monitoring:    · Continue monitoring for antibiotic toxicity as follows: CBC, CMP, QTc interval  · Continue to watch for following: new or worsening fever, new hypotension, hives, lip swelling and redness or purulence at vascular access sites. I/v access Management:    · Continue to monitor i.v access sites for erythema, induration, discharge or tenderness. · As always, continue efforts to minimize tubes/lines/drains as clinically appropriate to reduce chances of line associated infections. Patient education and counseling:        · The patient was educated in detail about the side-effects of various antibiotics and things to watch for like new rashes, lip swelling, severe reaction, worsening diarrhea, break through fever etc.  · Discussed patient's condition and what to expect. All of the patient's questions were addressed in a satisfactory manner and patient verbalized understanding all instructions. Level of complexity of visit: High       · Illness(es)/ Infection present that pose threat to life/bodily function. · There is potential for severe exacerbation of infection/side effects of treatment. · Therapy requires intensive monitoring for antimicrobial agent toxicity. TIME SPENT TODAY:     - Spent over  36 minutes on visit (including interval history, physical exam, review of data including labs, cultures, imaging, development and implementation of treatment plan and coordination of complex care). More than 50 percent of this includes face-to-face time spent with the patient for counseling and coordination of care. Thank you for involving me in the care of your patient. I will continue to follow. If you have anyadditional questions, please do not hesitate to contact me. Subjective: Interval history: Interval history was obtained from chart review and patient/ RN. The patient had a fever 100.6 today. He is off antibiotics otherwise. Has ongoing dementia. REVIEW OF SYSTEMS:      Review of Systems   Unable to perform ROS: Dementia         Past Medical History: All past medical history reviewed today.     Past 9.5 6.9 7.0   RBC 4.25 4.17* 4.24   HGB 14.0 13.4* 13.6   HCT 39.7* 39.1* 39.4*   * 112* 123*   MCV 93.3 93.9 92.9   MCH 32.8 32.1 32.0   MCHC 35.2 34.2 34.5   RDW 13.5 13.7 13.6        BMP:  Recent Labs     07/21/21  0137 07/22/21  0521 07/23/21  0515   * 138 137   K 3.8 4.1 3.9    105 103   CO2 22 23 21   BUN 12 14 11   CREATININE 1.1 1.4* 1.1   CALCIUM 8.9 8.8 8.9   GLUCOSE 83 67* 61*        Hepatic Function Panel:   Lab Results   Component Value Date    ALKPHOS 74 07/21/2021    ALT 17 07/21/2021    AST 36 07/21/2021    PROT 6.8 07/21/2021    BILITOT 0.7 07/21/2021    LABALBU 3.1 07/21/2021       CPK: No results found for: CKTOTAL  ESR: No results found for: SEDRATE  CRP: No results found for: CRP        Imaging: All pertinent images and reports for the current visit were reviewed by me during this visit. XR CHEST PORTABLE   Final Result   Improved aeration of the lung bases. Bandlike opacity remains on the right,   likely atelectasis given its bandlike morphology         XR ABDOMEN (KUB) (SINGLE AP VIEW)   Final Result   Slowly resolving constipation with decreasing bowel dilatation which could   represent a resolving partial obstruction or ileus. NM MYOCARDIAL SPECT REST EXERCISE OR RX   Final Result      XR ABDOMEN (KUB) (SINGLE AP VIEW)   Final Result   Large amount of stool in the colon suggests constipation. Dilated air-filled   loops of small bowel and colon in association. Fecal impaction may be   considered clinically. CT CHEST WO CONTRAST   Final Result   De pendant opacity and bandlike opacities are seen at the lung bases, with   atelectasis favored over pneumonia. Trace pleural effusions are seen. Lytic and sclerotic appearing T9 vertebral body. Some certain measuring not   this represents a typical hemangioma common etiology such as Paget's disease,   or metastatic disease. Consider further characterization with bone scan.          CT HEAD WO CONTRAST   Final Result   No acute intracranial abnormality. 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 Enlarging left frontal scalp hematoma. XR CHEST PORTABLE   Final Result   Interval development of bibasilar airspace disease, atelectasis favored over   pneumonia. XR CHEST PORTABLE   Final Result   No acute cardiopulmonary disease. No left-sided pneumothorax following pacer   placement. MRI BRAIN WO CONTRAST   Final Result   1. Left frontal scalp hematoma/edema. No evidence of acute intracranial   hemorrhage. 2. Again seen heterogeneous sellar/suprasellar with mass effect on the optic   chiasm and extension into the left sphenoid sinus. Evaluation is limited   secondary to lack of intravenous contrast (acute kidney injury). 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 3. Involution parenchymal changes with moderate microvascular ischemic   disease. 4. No evidence of acute infarct or midline shift. CT FACIAL BONES WO CONTRAST   Final Result   No acute traumatic injury of the facial bones. CT Cervical Spine WO Contrast   Final Result   No acute abnormality of the cervical spine. Multilevel degenerative disc disease with mild multilevel facet arthropathy. CT Head WO Contrast   Final Result   1. No acute intracranial abnormality. 2. Small left forehead contusion/scalp hematoma. 3. 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 4. Senescent changes. XR CHEST PORTABLE   Final Result   No radiographic evidence of acute cardiopulmonary disease. Medications: All current and past medications were reviewed.      milk and molasses  240 mL Rectal Once    apixaban  5 mg Oral BID    sodium chloride flush  5-40 mL Intravenous 2 times per day    rosuvastatin  10 mg Oral Nightly    trospium  20 mg Oral BID        sodium chloride 50 mL/hr at 07/22/21 1806    sodium chloride         sodium chloride flush, sodium chloride, ondansetron **OR** ondansetron, polyethylene glycol, acetaminophen **OR** acetaminophen      Problem list:       Patient Active Problem List   Diagnosis Code    Syncope and collapse R55    Acute kidney injury superimposed on CKD ([facility] [address] N17.9, N18.9    Hyponatremia E87.1    AV block, Mobitz 2 I44.1    S/P placement of cardiac pacemaker Z95.0    CKD (chronic kidney disease) stage 3, GFR 30-59 ml/min ([facility]) N18.30    HTN (hypertension) I10    FUO (fever of unknown origin) R50.9    Fecal impaction ([facility] [address] K56.41    Dementia ([facility] [address] F03.90    History of deep venous thrombosis (DVT) of distal vein of right lower extremity Z86.718    Atelectasis J98.11    Hematoma of scalp S00. 03XA    Anticoagulated Z79.01    Brain mass G93.89    Elevated serum creatinine R79.89    Lytic lesion of bone on x-ray M89.9       Please note that this chart was generated using Dragon dictation software. Although every effort was made to ensure the accuracy of this automated transcription, some errors in transcription may have occurred inadvertently. If you may need any clarification, please do not hesitate to contact me through EPIC or at the phone number provided below with my electronic signature. Any pictures or media included in this note were obtained after taking informed verbal consent from the patient and with their approval to include those in the patient's medical record.     Valerie Talbot MD, MPH  7/23/2021 , 11:10 AM   [facility] Infectious Disease   [address] [place] [address], [address] [place] [address]  Office: [phone]  Fax: [phone]  Clinic days:  Tuesday & Thursday

## 2021-07-23 NOTE — PROGRESS NOTES
Hospitalist Progress Note      PCP: Joceline Martinez MD    Date of Admission: 7/17/2021    Chief Complaint: [facility] Course: 77 yo M with history of brain aneurysm, sellar mass, h/o DVT on Eliquis, dementia, CKD 3 who was brought to ER for syncope and fall. In ED, found to have NAMRATA on CKD and scalp hematoma. Admitted as inpatient for further management. Noted to be in Mobitz II, s/p PPM on 7/19/21. SPECT requested per Cardio to r/o CAD. Developed fevers on evening of 7/20 and has more swelling in L PPM pocket on 7/21/21; Eliquis held on 7/21/21. Noted to have atelectasis and fecal impaction. Molasses enema ordered on 7/21. Will go to [facility] upon DC. SPECT negative on 7/22. ID consulted for fevers. Neuro and NS consult consulted for waxing waning mental status and possible relation to intracranial mass. Subjective:   Patient had fever this morning. Awake. No CP, HA or abdominal pain. Medications:  Reviewed    Infusion Medications    sodium chloride 50 mL/hr at 07/22/21 1806    sodium chloride       Scheduled Medications    ciprofloxacin  500 mg Oral 2 times per day    metroNIDAZOLE  500 mg Oral 3 times per day    apixaban  5 mg Oral BID    sodium chloride flush  5-40 mL Intravenous 2 times per day    rosuvastatin  10 mg Oral Nightly    trospium  20 mg Oral BID     PRN Meds: sodium chloride flush, sodium chloride, ondansetron **OR** ondansetron, polyethylene glycol, acetaminophen **OR** acetaminophen      Intake/Output Summary (Last 24 hours) at 7/23/2021 1418  Last data filed at 7/23/2021 0920  Gross per 24 hour   Intake 330 ml   Output 1700 ml   Net -1370 ml       Physical Exam Performed:    /83   Pulse 115   Temp 99.3 °F (37.4 °C) (Oral)   Resp 16   Ht 5' 4\" (1.626 m)   Wt 144 lb 13.5 oz (65.7 kg)   SpO2 95%   BMI 24.86 kg/m²     General appearance: No apparent distress, appears stated age and cooperative.   HEENT: Pupils equal, round, and reactive to light. Conjunctivae/corneas clear. Neck: Supple, with full range of motion. No jugular venous distention. Trachea midline. Respiratory:  Normal respiratory effort. Clear to auscultation, bilaterally without Rales/Wheezes/Rhonchi. Cardiovascular: Regular rate and rhythm with normal S1/S2 without murmurs, rubs or gallops. L PPM site is swollen  Abdomen: Soft, non-tender, mildly distended with normal bowel sounds. Musculoskeletal: No clubbing, cyanosis or edema bilaterally. Full range of motion without deformity. Skin: Skin color, texture, turgor normal.  No rashes or lesions. Neurologic:  Neurovascularly intact without any focal sensory/motor deficits. Cranial nerves: II-XII intact, grossly non-focal.  Psychiatric: Alert and oriented, thought content appropriate, normal insight  Capillary Refill: Brisk,3 seconds, normal   Peripheral Pulses: +2 palpable, equal bilaterally       Labs:   Recent Labs     07/21/21  0137 07/22/21  0521 07/23/21  0515   WBC 9.5 6.9 7.0   HGB 14.0 13.4* 13.6   HCT 39.7* 39.1* 39.4*   * 112* 123*     Recent Labs     07/21/21  0137 07/22/21  0521 07/23/21  0515   * 138 137   K 3.8 4.1 3.9    105 103   CO2 22 23 21   BUN 12 14 11   CREATININE 1.1 1.4* 1.1   CALCIUM 8.9 8.8 8.9     Recent Labs     07/21/21 0137   AST 36   ALT 17   BILITOT 0.7   ALKPHOS 74     No results for input(s): INR in the last 72 hours. No results for input(s): [place] in the last 72 hours. Urinalysis:      Lab Results   Component Value Date    NITRU Negative 07/21/2021    WBCUA 1 07/21/2021    RBCUA 3 07/21/2021    BLOODU Negative 07/21/2021    SPECGRAV 1.015 07/21/2021    GLUCOSEU Negative 07/21/2021       Radiology:  XR CHEST PORTABLE   Final Result   Improved aeration of the lung bases.   Bandlike opacity remains on the right,   likely atelectasis given its bandlike morphology         XR ABDOMEN (KUB) (SINGLE AP VIEW)   Final Result   Slowly resolving constipation with decreasing bowel dilatation which could   represent a resolving partial obstruction or ileus. NM MYOCARDIAL SPECT REST EXERCISE OR RX   Final Result      XR ABDOMEN (KUB) (SINGLE AP VIEW)   Final Result   Large amount of stool in the colon suggests constipation. Dilated air-filled   loops of small bowel and colon in association. Fecal impaction may be   considered clinically. CT CHEST WO CONTRAST   Final Result   De pendant opacity and bandlike opacities are seen at the lung bases, with   atelectasis favored over pneumonia. Trace pleural effusions are seen. Lytic and sclerotic appearing T9 vertebral body. Some certain measuring not   this represents a typical hemangioma common etiology such as Paget's disease,   or metastatic disease. Consider further characterization with bone scan. CT HEAD WO CONTRAST   Final Result   No acute intracranial abnormality. 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 Enlarging left frontal scalp hematoma. XR CHEST PORTABLE   Final Result   Interval development of bibasilar airspace disease, atelectasis favored over   pneumonia. XR CHEST PORTABLE   Final Result   No acute cardiopulmonary disease. No left-sided pneumothorax following pacer   placement. MRI BRAIN WO CONTRAST   Final Result   1. Left frontal scalp hematoma/edema. No evidence of acute intracranial   hemorrhage. 2. Again seen heterogeneous sellar/suprasellar with mass effect on the optic   chiasm and extension into the left sphenoid sinus. Evaluation is limited   secondary to lack of intravenous contrast (acute kidney injury). 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 3. Involution parenchymal changes with moderate microvascular ischemic   disease. 4. No evidence of acute infarct or midline shift.          CT FACIAL BONES WO CONTRAST   Final Result   No acute traumatic injury of the facial bones. CT Cervical Spine WO Contrast   Final Result   No acute abnormality of the cervical spine. Multilevel degenerative disc disease with mild multilevel facet arthropathy. CT Head WO Contrast   Final Result   1. No acute intracranial abnormality. 2. Small left forehead contusion/scalp hematoma. 3. 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 4. Senescent changes. XR CHEST PORTABLE   Final Result   No radiographic evidence of acute cardiopulmonary disease. Assessment/Plan:    Active Hospital Problems    Diagnosis     Hematoma of scalp [S00.03XA]     Anticoagulated [Z79.01]     Brain mass [G93.89]     Elevated serum creatinine [R79.89]     Lytic lesion of bone on x-ray [M89.9]     CKD (chronic kidney disease) stage 3, GFR 30-59 ml/min ([facility]) [N18.30]     HTN (hypertension) [I10]     High fever [R50.9]     Fecal impaction ([facility]) [K56.41]     Dementia ([facility]) [F03.90]     History of DVT (deep vein thrombosis) [Z86.718]     Atelectasis [J98.11]     S/P placement of cardiac pacemaker [Z95.0]     Hyponatremia [E87.1]     AV block, Mobitz 2 [I44.1]     Acute kidney injury superimposed on CKD ([facility] [address] [N17.9, N18.9]     Syncope and collapse [R55]      1. ID consult for fevers pending  2. Resumed Eliquis  3. Neuro consult for AMS appreciated  4. NO NARCOTICS OR NEUROSEDATING MEDS  5. PT/OT eval recommend SNF  6. Stopped Ditropan and Flomax given recurrent orthostasis; monitor for urinary retention and place Douglas if PVR > 300 cc  7. Neurosurgery consult for sellar mass appreciated; follow up with Dr Katy Gautam as OP  8. Repeat KUB with constipation; repeat molasses enema X 1  9. SPECT negative    DVT Prophylaxis: Eliquis  Diet: ADULT DIET;  Regular  Adult Oral Nutrition Supplement; Standard High Calorie/High Protein Oral Supplement  Code Status: Full Code    PT/OT Eval Status: Following    Dispo - [facility]    Discussed with patient and Wing RN. With fevers, still not ready for SNF today.     Sia Nieves MD

## 2021-07-23 NOTE — CONSULTS
Drug use: Never     Allergies   Allergen Reactions    Keflex [Cephalexin] Hives     Current Facility-Administered Medications   Medication Dose Route Frequency Provider Last Rate Last Admin    ciprofloxacin (CIPRO) tablet 500 mg  500 mg Oral 2 times per day Regina Santiago MD   500 mg at 07/23/21 1203    metroNIDAZOLE (FLAGYL) tablet 500 mg  500 mg Oral 3 times per day Regina Santiago MD   500 mg at 07/23/21 1203    apixaban (ELIQUIS) tablet 5 mg  5 mg Oral BID Farshad Jensen MD   5 mg at 07/23/21 0811    0.9 % sodium chloride infusion   Intravenous Continuous Farshad Jensen MD 50 mL/hr at 07/22/21 1806 Rate Change at 07/22/21 1806    sodium chloride flush 0.9 % injection 5-40 mL  5-40 mL Intravenous 2 times per day Morrell Boast, MD   10 mL at 07/23/21 0814    sodium chloride flush 0.9 % injection 5-40 mL  5-40 mL Intravenous PRN Morrell Boast, MD        rosuvastatin (CRESTOR) tablet 10 mg  10 mg Oral Nightly Chester Galindo MD   10 mg at 07/22/21 2050    0.9 % sodium chloride infusion  25 mL Intravenous PRN Chester Galindo MD        ondansetron (ZOFRAN-ODT) disintegrating tablet 4 mg  4 mg Oral Q8H PRN Chester Galindo MD        Or    ondansetron (ZOFRAN) injection 4 mg  4 mg Intravenous Q6H PRN Chester Galindo MD   4 mg at 07/18/21 0809    polyethylene glycol (GLYCOLAX) packet 17 g  17 g Oral Daily PRN Chester Galindo MD        acetaminophen (TYLENOL) tablet 650 mg  650 mg Oral Q6H PRN Chester Galindo MD   650 mg at 07/23/21 4398    Or    acetaminophen (TYLENOL) suppository 650 mg  650 mg Rectal Q6H PRN Chester Galindo MD        trospium (SANCTURA) tablet 20 mg  20 mg Oral BID Chester Galindo MD   20 mg at 07/23/21 0811       ROS: 10-14 system review was limited due to MS changes or as per HPI.      Constitutional:   Vitals:    07/23/21 0437 07/23/21 0449 07/23/21 0800 07/23/21 1110   BP: (!) 160/92  128/81 124/83   Pulse: 123  119 115   Resp: 16  16 16   Temp: 98.4 °F (36.9 °C)  100.6 °F (38.1 °C) 99.3 °F (37.4 °C) TempSrc: Oral  Oral Oral   SpO2: 95%  98% 95%   Weight:  144 lb 13.5 oz (65.7 kg)     Height:           General appearance: Confused   Eye: Fundus of the eye: Optic disc is difficult to obtain due to poor cooperation from the patient  Neck: supple  Cardiovascular: No lower leg edema with good pulsation. Mental Status:   AAO times one  Poor attention and concentration. Waxing and waning. Unable to assess recent or remote memory due to confusion  Language: Fluent but with poor comprehension and not following directions  Unable to assess fund of knowledge due to confusion. Cranial Nerves:   II: Visual fields: full. Pupils: equal, round, reactive to light  III,IV,VI: Extra Ocular Movements are intact. No nystagmus  V: Facial sensation : normal  VII: Facial strength and movements: intact and symmetric  VIII: Hearing: can track my voice  IX: Palate elevation symmetric   XI: Shoulder shrug: symmetric  XII: Tongue movements: midline  Musculoskeletal:  The patient can move all 4 extremities. No apparent focal weakness. Generalized diffuse weakness with poor effort  Tone: Normal tone. No rigidity. Reflexes: Symmetric 2+ in both arms and 1 in the legs. Planters: flexor bilaterally. Coordination: No abnormal movement  Sensation: No major sensory loss   Gait/Posture: Cannot be tested due to poor cooperation with the patient.     Data:  LABS:   Lab Results   Component Value Date     07/23/2021    K 3.9 07/23/2021    K 3.8 07/21/2021     07/23/2021    CO2 21 07/23/2021    BUN 11 07/23/2021    CREATININE 1.1 07/23/2021    GFRAA >60 07/23/2021    LABGLOM >60 07/23/2021    GLUCOSE 61 07/23/2021    PHOS 2.5 04/01/2021    MG 1.80 07/23/2021    CALCIUM 8.9 07/23/2021     Lab Results   Component Value Date    WBC 7.0 07/23/2021    RBC 4.24 07/23/2021    HGB 13.6 07/23/2021    HCT 39.4 07/23/2021    MCV 92.9 07/23/2021    RDW 13.6 07/23/2021     07/23/2021     Lab Results   Component Value Date    INR 1.66 (H) 04/01/2021    PROTIME 19.4 (H) 04/01/2021       Neuroimaging were independently reviewed by me. Reviewed notes from different physicians  Reviewed lab and blood testing    Impression:  Acute and persistent encephalopathy, severe. Likely multifactorial metabolic encephalopathy. Sepsis  Suprasellar mass  Hypertension  Chronic anticoagulation  Pacemaker  History of DVT  Acute on chronic kidney disease    Recommendation:  Continue current supportive care  Consider echo  Limit any sedation  Less likely CNS infection  Follow fever curve  Continue Eliquis  Statin  Check thyroid function testing  Neurosurgery has been consulted for suprasellar mass. PT OT  Speech evaluation  Continue antibiotics  Follow electrolytes  Telemetry  Aspiration precaution  Discussed with his nurse  We will follow    MDM: High due to persistent encephalopathy, possibility of multiorgan failure and poor morbidity. Thank you for referring such patient. If you have any questions regarding my consult note, please don't hesitate to call me. Triny Solomon MD  [phone]    This dictation was generated by voice recognition computer software.  Although all attempts are made to edit the dictation for accuracy, there may be errors in the  transcription that are not intended

## 2021-07-23 NOTE — CONSULTS
Palliative Care:     79 yo M with history of brain aneurysm, sellar mass, h/o DVT on Eliquis, dementia, CKD 3 who was brought to ER for syncope and fall. In ED, found to have NAMRATA on CKD and scalp hematoma. Admitted as inpatient for further management. Noted to be in Mobitz II, s/p PPM on 7/19/21. SPECT requested per Cardio to r/o CAD. Developed fevers on evening of 7/20 and has more swelling in L PPM pocket on 7/21/21; Eliquis held on 7/21/21. Noted to have atelectasis and fecal impaction. Molasses enema ordered on 7/21. Will go to [address] [place] [address] upon DC. SPECT negative on 7/22. Admitted 7/17/21 and Palliative consult placed 7/21/21        Past Medical History:   has a past medical history of Arthritis, Brain aneurysm, Dementia ([address] Enlarged prostate, Essential hypertension, Foot pain, History of intracranial hemorrhage, Overactive bladder, Pituitary mass ([address] and Right leg DVT ([facility] [address] Past Surgical History:   has a past surgical history that includes Appendectomy (1959) and Mouth surgery (2020). Advance Directives:    Full Code      Problem Severity: Pain/Other Symptoms:  Elevated temperature today. Bed Mobility/Toileting/Transfer:  Needs assistance OOB. Patient pending DC to [facility]      Performance Status:        Palliative Performance Scale:  100% []Full Normal activity & work No evidence of disease  90%   [] Full Normal activity & work Some evidence of disease  80%   [] Full Normal activity with Effort Some evidence of disease  70%   [] Reduced Unable Normal Job/Work Significant disease Full Normal or reduced  60%   [] Ambulation reduced; Significant disease; Can't do hobbies/housework; intake normal   or reduced; occasional assist; LOC full/confusion  50%   [] Mainly sit/lie;  Extensive disease; Can't do any work; Considerable assist; intake normal  Or reduced; LOC full/confusion  40%   [x] Mainly in bed; Extensive disease; Mainly assist; intake normal or reduced; occasional assist; LOC full/confusion  30%   [] Bed Bound; Extensive disease; Total care; intake reduced; LOC full/confusion  20%   [] Bed Bound; Extensive disease; Total care; intake minimal; Drowsy/coma  10%   [] Bed Bound; Extensive disease; Total care; Mouth care only; Drowsy/coma    PPS 40%    Symptom Assessment: Appetite/Nausea/Bowels/Fatigue:    lbs. Albumin 3.1      Social History:   reports that he has never smoked. He has never used smokeless tobacco. He reports that he does not drink alcohol and does not use drugs. Family History:  family history includes Diabetes in his sister. Psychological/Spiritual:     . One daughter Alex Hebert. Non-Congregational.     Family Discussion:    Patient limited with any interaction during our discussion. Denies any pain. Patient wishes remain as full code and verified with Dr. Nhung Lassiter. Patient's daughter Betty Cohen. Discussed Hindu. Patient denies offer for any spiritual support. DC POC when stable is to [facility]. No further palliative care needed at this time.

## 2021-07-23 NOTE — CONSULTS
Neurosurgery Consult Note    Reason for Consult: sellar mass  Requesting Provider:Dr. Marco Leslie    Subjective:  CHIEF COMPLAINT / HPI:  Robin Amaral is a 78 y.o. male patient history of brain aneurysm, sellar mass, h/o DVT on Eliquis, dementia, CKD 3 who was brought to ER for syncope and fall.  In ED, found to have NAMRATA on CKD and scalp hematoma.  Admitted as inpatient for further management.  Noted to be in Mobitz II, s/p PPM on 7/19/21.  SPECT requested per Cardio to r/o CAD.  Developed fevers on evening of 7/20 and has more swelling in L PPM pocket on 7/21/21; Eliquis held on 7/21/21.  Noted to have atelectasis and fecal impaction.  ID has been consulted for fever which is suspected to be due to aspiration. Patient has been followed for the past several years with Dr. Mary Gonsalves with [facility]. Patient declined surgery in 2019. Repeat MRI last year was stable with known significant mass effect on the optic chiasm.     Past Medical History:   Diagnosis Date    Arthritis     Brain aneurysm     Dementia ([address]     Enlarged prostate     Essential hypertension     Foot pain 03/30/2021    complaints of foot pain today    History of intracranial hemorrhage     Overactive bladder     Pituitary mass (HCC)     Right leg DVT (HCC)      Past Surgical History:   Procedure Laterality Date    APPENDECTOMY  1959    MOUTH SURGERY  2020    molars removed     Keflex [cephalexin]    Current Facility-Administered Medications:     apixaban (ELIQUIS) tablet 5 mg, 5 mg, Oral, BID, Blayne West MD, 5 mg at 07/23/21 0811    0.9 % sodium chloride infusion, , Intravenous, Continuous, Blayne West MD, Last Rate: 50 mL/hr at 07/22/21 1806, Rate Change at 07/22/21 1806    sodium chloride flush 0.9 % injection 5-40 mL, 5-40 mL, Intravenous, 2 times per day, Martin Yoon MD, 10 mL at 07/23/21 0814    sodium chloride flush 0.9 % injection 5-40 mL, 5-40 mL, Intravenous, PRN, Martin Yoon MD    rosuvastatin (Sam Bakari) tablet 10 mg, 10 mg, Oral, Nightly, Nola Haile MD, 10 mg at 07/22/21 2050    0.9 % sodium chloride infusion, 25 mL, Intravenous, PRN, Martine Zaman MD    ondansetron (ZOFRAN-ODT) disintegrating tablet 4 mg, 4 mg, Oral, Q8H PRN **OR** ondansetron (ZOFRAN) injection 4 mg, 4 mg, Intravenous, Q6H PRN, Martine Zaman MD, 4 mg at 07/18/21 0809    polyethylene glycol (GLYCOLAX) packet 17 g, 17 g, Oral, Daily PRN, Martine Zaman MD    acetaminophen (TYLENOL) tablet 650 mg, 650 mg, Oral, Q6H PRN, 650 mg at 07/23/21 0811 **OR** acetaminophen (TYLENOL) suppository 650 mg, 650 mg, Rectal, Q6H PRN, Martine Zaman MD    [facility]) tablet 20 mg, 20 mg, Oral, BID, Martine Zaman MD, 20 mg at 07/23/21 6447  Social History     Socioeconomic History    Marital status:      Spouse name: Not on file    Number of children: 2    Years of education: Not on file    Highest education level: Not on file   Occupational History    Not on file   Tobacco Use    Smoking status: Never Smoker    Smokeless tobacco: Never Used   Substance and Sexual Activity    Alcohol use: Never    Drug use: Never    Sexual activity: Not Currently   Other Topics Concern    Not on file   Social History Narrative    Not on file     Social Determinants of Health     Financial Resource Strain:     Difficulty of Paying Living Expenses:    Food Insecurity:     Worried About Running Out of Food in the Last Year:     [address] in the Last Year:    Transportation Needs:     Lack of Transportation (Medical):      Lack of Transportation (Non-Medical):    Physical Activity:     Days of Exercise per Week:     Minutes of Exercise per Session:    Stress:     Feeling of Stress :    Social Connections:     Frequency of Communication with Friends and Family:     Frequency of Social Gatherings with Friends and Family:     Attends [employer] Services:     Active Member of Clubs or Organizations:     Attends Club or Organization Meetings:  Marital Status:    Intimate Partner Violence:     Fear of Current or Ex-Partner:     Emotionally Abused:     Physically Abused:     Sexually Abused:      Family History   Problem Relation Age of Onset    Diabetes Sister      PHYSICAL EXAMINATION:  /81   Pulse 119   Temp 100.6 °F (38.1 °C) (Oral)   Resp 16   Ht 5' 4\" (1.626 m)   Wt 144 lb 13.5 oz (65.7 kg)   SpO2 98%   BMI 24.86 kg/m²   GENERAL:  Alert well-developed male sitting in bed eating breakfast in no acute distress  HEENT:  sclera clear and neck supple  NEUROLOGIC:  Awake, alert, oriented to name, states he is at the [address] [place] [address] but aware he is here because of a fall, aware that it is morning  Cranial nerves II-XII are grossly intact. Motor is 5 out of 5 bilaterally. LABORATORY DATA:   CBC with Differential:    Lab Results   Component Value Date    WBC 7.0 07/23/2021    RBC 4.24 07/23/2021    HGB 13.6 07/23/2021    HCT 39.4 07/23/2021     07/23/2021    MCV 92.9 07/23/2021    MCH 32.0 07/23/2021    MCHC 34.5 07/23/2021    RDW 13.6 07/23/2021    BANDSPCT 1 07/19/2021    LYMPHOPCT 24.3 07/23/2021    MONOPCT 9.4 07/23/2021    BASOPCT 0.8 07/23/2021    MONOSABS 0.7 07/23/2021    LYMPHSABS 1.7 07/23/2021    EOSABS 0.2 07/23/2021    BASOSABS 0.1 07/23/2021     CMP:    Lab Results   Component Value Date     07/23/2021    K 3.9 07/23/2021    K 3.8 07/21/2021     07/23/2021    CO2 21 07/23/2021    BUN 11 07/23/2021    CREATININE 1.1 07/23/2021    GFRAA >60 07/23/2021    AGRATIO 0.8 07/21/2021    LABGLOM >60 07/23/2021    GLUCOSE 61 07/23/2021    PROT 6.8 07/21/2021    LABALBU 3.1 07/21/2021    CALCIUM 8.9 07/23/2021    BILITOT 0.7 07/21/2021    ALKPHOS 74 07/21/2021    AST 36 07/21/2021    ALT 17 07/21/2021        IMAGING STUDIES:   CT of the Brain:  Date: 7/21/21;  Result:   FINDINGS:   BRAIN/VENTRICLES: A large invasive/destructive mass is again demonstrated   within the sella/suprasellar region, extending inferiorly into the left   sphenoid sinus and possibly the left cavernous sinus.  There is prominence of   the ventricles and sulci due to global parenchymal volume loss. Ernie Otoole are   nonspecific areas of hypoattenuation within the periventricular and   subcortical white matter, which likely represent chronic microvascular   ischemic change.  There is no acute hemorrhage, hydrocephalus or cortical   based infarct.       ORBITS: The visualized portion of the orbits demonstrate no acute abnormality.       SINUSES: The visualized paranasal sinuses and mastoid air cells demonstrate   no acute abnormality.       SOFT TISSUES/SKULL: Large left frontal scalp hematoma has increased in size   over the interval.           Impression   No acute intracranial abnormality.       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       Enlarging left frontal scalp hematoma. MRI of the Brain:  Date: 7/19/21; Result:   FINDINGS:   INTRACRANIAL STRUCTURES/VENTRICLES: No evidence of acute intracranial   hemorrhage.  No evidence of acute infarct.  Involutional parenchymal changes.    Scattered periventricular and deep white matter as well as pontine T2/FLAIR   hyperintensities are nonspecific but likely related to microvascular ischemic   disease.  No ventriculomegaly.  Again seen heterogeneous sella/suprasellar   mass with extension into the left sphenoid sinus.  There is mass effect on   the optic chiasm which appears bowed.  There is no convincing evidence of   cavernous sinus invasion on nonaddicted examination.  The pituitary stock is   thickened.  Overall the lesion measures approximately 3.2 x 1.9 x 2.0 cm   (anteroposterior by transverse by craniocaudal).  Major intracranial flow   voids appear preserved.       ORBITS: Bilateral lens replacement, otherwise the visualized portion of the   orbits demonstrate no acute abnormality.       SINUSES: Partial opacification the right frontal and ethmoid sinuses with mucosal thickening of the right sphenoid sinus.  Query trace left mastoid   effusions.       BONES/SOFT TISSUES: The bone marrow signal intensity appears normal. Left   frontal scalp hematoma/edema.           Impression   1. Left frontal scalp hematoma/edema.  No evidence of acute intracranial   hemorrhage. 2. Again seen heterogeneous sellar/suprasellar with mass effect on the optic   chiasm and extension into the left sphenoid sinus.  Evaluation is limited   secondary to lack of intravenous contrast (acute kidney injury).  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 3. Involution parenchymal changes with moderate microvascular ischemic   disease. 4. No evidence of acute infarct or midline shift. IMPRESSION/PLAN:  Principal Problem:    AV block, Mobitz 2  Active Problems:    Syncope and collapse    Acute kidney injury superimposed on CKD (HCC)    Hyponatremia    S/P placement of cardiac pacemaker    CKD (chronic kidney disease) stage 3, GFR 30-59 ml/min (HCC)    HTN (hypertension)    FUO (fever of unknown origin)    Dementia (HCC)      Hematoma of scalp    Patient with known sellar mass may be slightly enlarged compared images last year. There is no concerns for his vision nor does this cause fever or arrhythmia. Recommendation is for the patient to follow-up with Dr. Mateo Mcknight as an outpatient whom he has been following him for this. No urgent surgical intervention indicated. The patient's symptoms, exam, testing and plan of care have been discussed with Dr. Grady Rawls And Dr. Mateo Mcknight. Thank you again for this consultation.     GLENDY Gray - CNP  7/23/2021

## 2021-07-23 NOTE — PLAN OF CARE
Problem: Falls - Risk of:  Goal: Will remain free from falls  Description: Will remain free from falls  7/23/2021 0058 by Suri Mccormick RN  Outcome: Ongoing  7/22/2021 1212 by Tasia Choi RN  Outcome: Ongoing  Goal: Absence of physical injury  Description: Absence of physical injury  7/22/2021 1212 by Tasia Choi RN  Outcome: Ongoing     Problem: Pain:  Goal: Pain level will decrease  Description: Pain level will decrease  7/23/2021 0058 by Suri Mccormick RN  Outcome: Ongoing  7/22/2021 1212 by Tasia Choi RN  Outcome: Ongoing  Goal: Control of acute pain  Description: Control of acute pain  7/22/2021 1212 by Tasia Choi RN  Outcome: Ongoing  Goal: Control of chronic pain  Description: Control of chronic pain  7/22/2021 1212 by Tasia Choi RN  Outcome: Ongoing     Problem: Fluid Volume:  Goal: Ability to achieve a balanced intake and output will improve  Description: Ability to achieve a balanced intake and output will improve  7/23/2021 0058 by Suri Mccormick RN  Outcome: Ongoing  7/22/2021 1212 by Tasia Choi RN  Outcome: Ongoing     Problem: Physical Regulation:  Goal: Ability to maintain clinical measurements within normal limits will improve  Description: Ability to maintain clinical measurements within normal limits will improve  7/22/2021 1212 by Tasia Choi RN  Outcome: Ongoing  Goal: Will show no signs and symptoms of electrolyte imbalance  Description: Will show no signs and symptoms of electrolyte imbalance  7/23/2021 0058 by Suri Mccormick RN  Outcome: Ongoing  7/22/2021 1212 by Tasia Choi RN  Outcome: Ongoing     Problem: Cardiovascular  Goal: No DVT, peripheral vascular complications  1/30/5891 0058 by Suri Mccormick RN  Outcome: Ongoing  7/22/2021 1212 by Tasia Choi RN  Outcome: Ongoing  Goal: Hemodynamic stability  7/23/2021 0058 by Suri Mccormick RN  Outcome: Ongoing  7/22/2021 1212 by Tasia Choi RN  Outcome: Ongoing  Goal: Anticoagulate/Hct stable  7/22/2021 1212 by Fracisco Rivers RN  Outcome: Ongoing  Goal: Agreement to quit smoking  7/22/2021 1212 by Fracisco Rivers RN  Outcome: Ongoing  Goal: Weight maintained or lost  7/22/2021 1212 by Fracisco Rivers RN  Outcome: Ongoing  Goal: Understanding of dietary restrictions  7/22/2021 1212 by Fracisco Rivers RN  Outcome: Ongoing     Problem: Skin Integrity:  Goal: Will show no infection signs and symptoms  Description: Will show no infection signs and symptoms  7/23/2021 0058 by Noni Maya RN  Outcome: Ongoing  7/22/2021 1212 by Fracisco Rivers RN  Outcome: Ongoing  Goal: Absence of new skin breakdown  Description: Absence of new skin breakdown  7/23/2021 0058 by Noni Maya RN  Outcome: Ongoing  7/22/2021 1212 by Fracisco Rivers RN  Outcome: Ongoing     Problem: Musculor/Skeletal Functional Status  Goal: Highest potential functional level  7/23/2021 0058 by Noni Maya RN  Outcome: Ongoing  7/22/2021 1212 by Fracisco Rivers RN  Outcome: Ongoing  Goal: Absence of falls  7/23/2021 0058 by Noni Maya RN  Outcome: Ongoing  7/22/2021 1212 by Fracisco Rivers RN  Outcome: Ongoing     Problem: Nutrition  Goal: Optimal nutrition therapy  7/23/2021 0058 by Noni Maya RN  Outcome: Ongoing  7/22/2021 1212 by Fracisco Rivers RN  Outcome: Ongoing

## 2021-07-24 PROBLEM — E16.2 HYPOGLYCEMIA: Status: ACTIVE | Noted: 2021-01-01

## 2021-07-24 PROBLEM — L03.114 LEFT ARM CELLULITIS: Status: ACTIVE | Noted: 2021-01-01

## 2021-07-24 NOTE — PROGRESS NOTES
Marino Nuñez  Neurology Follow-up  [facility] Neurology    Date of Service: 7/24/2021    Subjective:   CC: Follow up today regarding: Acute encephalopathy    Events noted. Chart and lab reviewed. The patient is about the same. He is by himself. Awake and alert but can follow direction. Denies any headache. Poor effort but no focal weakness. Other review of system was unremarkable. ROS : A 10-12 system review could not be obtained due to poor cooperation and confusion. family history includes Diabetes in his sister.     Past Medical History:   Diagnosis Date    Arthritis     Brain aneurysm     Dementia ([address]     Enlarged prostate     Essential hypertension     Foot pain 03/30/2021    complaints of foot pain today    History of intracranial hemorrhage     Overactive bladder     Pituitary mass (HCC)     Right leg DVT (HCC)      Current Facility-Administered Medications   Medication Dose Route Frequency Provider Last Rate Last Admin    dextrose 5 % in lactated ringers infusion   Intravenous Continuous Aneudy Varela MD 80 mL/hr at 07/24/21 0629 New Bag at 07/24/21 0629    ciprofloxacin (CIPRO) tablet 500 mg  500 mg Oral 2 times per day Frank Redmond MD   500 mg at 07/24/21 0826    metroNIDAZOLE (FLAGYL) tablet 500 mg  500 mg Oral 3 times per day Frank Redmond MD   500 mg at 07/24/21 0528    apixaban (ELIQUIS) tablet 5 mg  5 mg Oral BID Dorita Timmons MD   5 mg at 07/24/21 0825    sodium chloride flush 0.9 % injection 5-40 mL  5-40 mL Intravenous 2 times per day Margaux Victoria MD   10 mL at 07/23/21 0814    sodium chloride flush 0.9 % injection 5-40 mL  5-40 mL Intravenous PRN Margaux Victoria MD        rosuvastatin (CRESTOR) tablet 10 mg  10 mg Oral Nightly Nola Haile MD   10 mg at 07/23/21 2040    0.9 % sodium chloride infusion  25 mL Intravenous PRN Benji Templeton MD        ondansetron (ZOFRAN-ODT) disintegrating tablet 4 mg  4 mg Oral Q8H PRN Benji Templeton MD        Or  ondansetron (ZOFRAN) injection 4 mg  4 mg Intravenous Q6H PRN Juan Sweeney MD   4 mg at 07/18/21 0809    polyethylene glycol (GLYCOLAX) packet 17 g  17 g Oral Daily PRN Juan Sweeney MD        acetaminophen (TYLENOL) tablet 650 mg  650 mg Oral Q6H PRN Juan Sweeney MD   650 mg at 07/23/21 5042    Or    acetaminophen (TYLENOL) suppository 650 mg  650 mg Rectal Q6H PRN Juan Sweeney MD        [facility]) tablet 20 mg  20 mg Oral BID Juan Sweeney MD   20 mg at 07/24/21 0825     Allergies   Allergen Reactions    Keflex [Cephalexin] Hives      reports that he has never smoked. He has never used smokeless tobacco. He reports that he does not drink alcohol and does not use drugs. Objective:  Constitutional:   Vitals:    07/24/21 0430 07/24/21 0445 07/24/21 0815 07/24/21 1200   BP: 139/83  109/69 114/76   Pulse: 99 101 116 112   Resp: 16  16 16   Temp: 99.5 °F (37.5 °C)  100 °F (37.8 °C) 99.7 °F (37.6 °C)   TempSrc: Oral  Oral Oral   SpO2: 93%  96% 95%   Weight:       Height:           General appearance: Confused   Mental Status:   AAO times one. Attention and concentration: poor, waxing and waning. Language: Fluent but nonsensical speech. Does not follow direction. Recent memory: Impaired  Remote memory: Impaired  Poor fund of knowledge  Cranial Nerves:   II: Pupils: equal, round, reactive to light  III,IV,VI: No gaze preference. No nystagmus  V: Facial sensation: Grossly unremarkable. VII: Facial strength and movements: intact and symmetric  XII: Tongue movements : midline  Musculoskeletal:  The patient can move all 4 extremities. No apparent focal weakness.     Normal tone  Coordination: No tremors  Sensation: No sensory loss  Gait cannot be examined due to confusion      Data:  LABS:   Lab Results   Component Value Date     07/24/2021    K 3.6 07/24/2021    K 3.8 07/21/2021     07/24/2021    CO2 21 07/24/2021    BUN 9 07/24/2021    CREATININE 1.0 07/24/2021    GFRAA >60 07/24/2021    LABGLOM >60 07/24/2021    GLUCOSE 48 07/24/2021    PHOS 2.5 04/01/2021    MG 1.70 07/24/2021    CALCIUM 8.9 07/24/2021     Lab Results   Component Value Date    WBC 9.1 07/24/2021    RBC 4.45 07/24/2021    HGB 14.3 07/24/2021    HCT 41.3 07/24/2021    MCV 92.9 07/24/2021    RDW 13.6 07/24/2021     07/24/2021     Lab Results   Component Value Date    INR 1.66 (H) 04/01/2021    PROTIME 19.4 (H) 04/01/2021        labs were independently reviewed by me  Reviewed notes from different physicians. Impression:  Acute and persistent encephalopathy, severe. Likely multifactorial metabolic encephalopathy. He is about the same today. Sepsis  Suprasellar mass  Hypertension  Chronic anticoagulation  Pacemaker  History of DVT  Acute on chronic kidney disease      Recommendation  Continue current supportive care  Continue Eliquis  Statin  PT, OT and speech  Aspiration precautions  Poor prognosis  Antibiotics  Insulin sliding scale  Blood pressure monitor  Neurochecks  DC planning when medically stable  Nothing to add from neurology at this point  Follow-up outpatient with neurosurgery regarding his sellar mass    No further recommendation  We will follow from periphery  Please call for questions. Alok York MD   [phone]      This dictation was generated by voice recognition computer software. Although all attempts are made to edit the dictation for accuracy, there may be errors in the transcription that are not intended.

## 2021-07-24 NOTE — PLAN OF CARE
Problem: Pain:  Goal: Pain level will decrease  Description: Pain level will decrease  Outcome: Met This Shift  Goal: Control of acute pain  Description: Control of acute pain  Outcome: Met This Shift  Goal: Control of chronic pain  Description: Control of chronic pain  Outcome: Met This Shift     Problem: Fluid Volume:  Goal: Ability to achieve a balanced intake and output will improve  Description: Ability to achieve a balanced intake and output will improve  Outcome: Ongoing     Problem: Physical Regulation:  Goal: Ability to maintain clinical measurements within normal limits will improve  Description: Ability to maintain clinical measurements within normal limits will improve  Outcome: Ongoing  Goal: Will show no signs and symptoms of electrolyte imbalance  Description: Will show no signs and symptoms of electrolyte imbalance  Outcome: Ongoing

## 2021-07-24 NOTE — PLAN OF CARE
Problem: Falls - Risk of:  Goal: Will remain free from falls  Description: Will remain free from falls  Outcome: Ongoing  Note: Pt assessed for fall. Pt is a high fall risk. Fall precautions in place. Bed alarm is active. Bed locked in lowest position and 2/4 rails up. Nonskid socks on. Call light and personal belongings within reach. Instructed pt to call out for needs. Pt verbalized understanding. Will continue to monitor. Problem: Pain:  Goal: Pain level will decrease  Description: Pain level will decrease  7/24/2021 1018 by Denia Patel RN  Outcome: Ongoing  Note: Assessed pt's pain on 0 -10 scale. Repositioned, provided emotional support. Call light within reach, will continue to monitor.          Problem: Fluid Volume:  Goal: Ability to achieve a balanced intake and output will improve  Description: Ability to achieve a balanced intake and output will improve  7/24/2021 1018 by Denia Patel RN  Outcome: Ongoing  7/24/2021 0436 by Osiris Verma RN  Outcome: Ongoing       Problem: Cardiovascular  Goal: No DVT, peripheral vascular complications  Outcome: Ongoing

## 2021-07-24 NOTE — PROGRESS NOTES
Hospitalist Progress Note      PCP: Sanam Ramos MD    Date of Admission: 7/17/2021    Chief Complaint: [facility] Course: 77 yo M with history of brain aneurysm, sellar mass, h/o DVT on Eliquis, dementia, CKD 3 who was brought to ER for syncope and fall. In ED, found to have NAMRATA on CKD and scalp hematoma. Admitted as inpatient for further management. Noted to be in [place], s/p PPM on 7/19/21. SPECT requested per Cardio to r/o CAD. Developed fevers on evening of 7/20 and has more swelling in L PPM pocket on 7/21/21; Eliquis held on 7/21/21. Noted to have atelectasis and fecal impaction. Molasses enema ordered on 7/21. Will go to [facility] upon DC. SPECT negative on 7/22. ID consulted for fevers. Neuro and NS consult consulted for waxing waning mental status and possible relation to intracranial mass; recommend outpatient f/u. ID recommended Cipro and Flagyl. Started on Vanco IV for L arm cellulitis on 7/24. Has been hypoglycemic, started on D5 IVF and POCT with hypoglycemia orders PRN. Kept NPO, started IV Reglan, ordered NGT and molasses enema. Surgery consulted. Discussed extensively with daughter on phone. Changed to DNR-CCA. Subjective:   Patient with low grade temps, no fevers. Awake. No CP, HA or abdominal pain. RN notes L arm redness.       Medications:  Reviewed    Infusion Medications    dextrose 5% in lactated ringers 80 mL/hr at 07/24/21 1820    dextrose      sodium chloride       Scheduled Medications    vancomycin  1,250 mg Intravenous Q24H    milk and molasses  240 mL Rectal Once    metoclopramide  5 mg Intravenous Q6H    metroNIDAZOLE  500 mg Intravenous Q8H    ciprofloxacin  400 mg Intravenous Q12H    sodium chloride flush  5-40 mL Intravenous 2 times per day    rosuvastatin  10 mg Oral Nightly    trospium  20 mg Oral BID     PRN Meds: glucose, dextrose, glucagon (rDNA), dextrose, sodium chloride flush, sodium chloride, ondansetron **OR** ondansetron, polyethylene glycol, acetaminophen **OR** acetaminophen      Intake/Output Summary (Last 24 hours) at 7/24/2021 1916  Last data filed at 7/24/2021 0850  Gross per 24 hour   Intake 2080 ml   Output 300 ml   Net 1780 ml       Physical Exam Performed:    /75   Pulse 109   Temp 99.8 °F (37.7 °C) (Oral)   Resp 16   Ht 5' 4\" (1.626 m)   Wt 144 lb 13.5 oz (65.7 kg)   SpO2 97%   BMI 24.86 kg/m²     General appearance: No apparent distress, appears stated age and cooperative. HEENT: Pupils equal, round, and reactive to light. Conjunctivae/corneas clear. Neck: Supple, with full range of motion. No jugular venous distention. Trachea midline. Respiratory:  Normal respiratory effort. Clear to auscultation, bilaterally without Rales/Wheezes/Rhonchi. Cardiovascular: Regular rate and rhythm with normal S1/S2 without murmurs, rubs or gallops. L PPM site is swollen  Abdomen: Soft, non-tender, mildly distended with normal bowel sounds. Musculoskeletal: No clubbing, cyanosis or edema bilaterally. Full range of motion without deformity. Skin: Skin color, texture, turgor normal.  No rashes or lesions. Neurologic:  Neurovascularly intact without any focal sensory/motor deficits. Cranial nerves: II-XII intact, grossly non-focal.  Psychiatric: Alert and oriented, thought content appropriate, normal insight  Capillary Refill: Brisk,3 seconds, normal   Peripheral Pulses: +2 palpable, equal bilaterally       Labs:   Recent Labs     07/22/21  0521 07/23/21  0515 07/24/21  0453   WBC 6.9 7.0 9.1   HGB 13.4* 13.6 14.3   HCT 39.1* 39.4* 41.3   * 123* 128*     Recent Labs     07/22/21  0521 07/23/21  0515 07/24/21  0453    137 136   K 4.1 3.9 3.6    103 101   CO2 23 21 21   BUN 14 11 9   CREATININE 1.4* 1.1 1.0   CALCIUM 8.8 8.9 8.9     No results for input(s): AST, ALT, BILIDIR, BILITOT, ALKPHOS in the last 72 hours. No results for input(s): INR in the last 72 hours.   No results for input(s): Cassia Trujillo in the last 72 hours. Urinalysis:      Lab Results   Component Value Date    NITRU Negative 07/21/2021    WBCUA 1 07/21/2021    RBCUA 3 07/21/2021    BLOODU Negative 07/21/2021    SPECGRAV 1.015 07/21/2021    GLUCOSEU Negative 07/21/2021       Radiology:  XR ABDOMEN (KUB) (SINGLE AP VIEW)   Final Result   Moderate gastric distension. Otherwise nonspecific bowel-gas pattern. Improved distention of the sigmoid colon         XR CHEST PORTABLE   Final Result   Improved aeration of the lung bases. Bandlike opacity remains on the right,   likely atelectasis given its bandlike morphology         XR ABDOMEN (KUB) (SINGLE AP VIEW)   Final Result   Slowly resolving constipation with decreasing bowel dilatation which could   represent a resolving partial obstruction or ileus. NM MYOCARDIAL SPECT REST EXERCISE OR RX   Final Result      XR ABDOMEN (KUB) (SINGLE AP VIEW)   Final Result   Large amount of stool in the colon suggests constipation. Dilated air-filled   loops of small bowel and colon in association. Fecal impaction may be   considered clinically. CT CHEST WO CONTRAST   Final Result   De pendant opacity and bandlike opacities are seen at the lung bases, with   atelectasis favored over pneumonia. Trace pleural effusions are seen. Lytic and sclerotic appearing T9 vertebral body. Some certain measuring not   this represents a typical hemangioma common etiology such as Paget's disease,   or metastatic disease. Consider further characterization with bone scan. CT HEAD WO CONTRAST   Final Result   No acute intracranial abnormality. 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 Enlarging left frontal scalp hematoma. XR CHEST PORTABLE   Final Result   Interval development of bibasilar airspace disease, atelectasis favored over   pneumonia.          XR CHEST PORTABLE   Final Result   No acute cardiopulmonary disease. No left-sided pneumothorax following pacer   placement. MRI BRAIN WO CONTRAST   Final Result   1. Left frontal scalp hematoma/edema. No evidence of acute intracranial   hemorrhage. 2. Again seen heterogeneous sellar/suprasellar with mass effect on the optic   chiasm and extension into the left sphenoid sinus. Evaluation is limited   secondary to lack of intravenous contrast (acute kidney injury). 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 3. Involution parenchymal changes with moderate microvascular ischemic   disease. 4. No evidence of acute infarct or midline shift. CT FACIAL BONES WO CONTRAST   Final Result   No acute traumatic injury of the facial bones. CT Cervical Spine WO Contrast   Final Result   No acute abnormality of the cervical spine. Multilevel degenerative disc disease with mild multilevel facet arthropathy. CT Head WO Contrast   Final Result   1. No acute intracranial abnormality. 2. Small left forehead contusion/scalp hematoma. 3. 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 4. Senescent changes. XR CHEST PORTABLE   Final Result   No radiographic evidence of acute cardiopulmonary disease.          XR ABDOMEN (KUB) (SINGLE AP VIEW)    (Results Pending)           Assessment/Plan:    Active Hospital Problems    Diagnosis     Left arm cellulitis [H03.443]     Hypoglycemia [E16.2]     Encephalopathy, metabolic [U64.77]     PAF (paroxysmal atrial fibrillation) (HCC) [I48.0]     Hematoma of scalp [S00.03XA]     Anticoagulated [Z79.01]     Brain mass [G93.89]     Elevated serum creatinine [R79.89]     Lytic lesion of bone on x-ray [M89.9]     CKD (chronic kidney disease) stage 3, GFR 30-59 ml/min ([facility]) [N18.30]     HTN (hypertension), benign [I10]     High fever [R50.9]     Fecal impaction

## 2021-07-25 NOTE — CONSULTS
[facility] GENERAL AND LAPAROSCOPIC SURGERY                       PATIENT NAME: Mary Ryan     ADMISSION DATE: 7/17/2021  3:59 PM      TODAY'S DATE: 7/25/2021    Reason for Consult:  Abd distension    Requesting Physician:  Dr. Eunice Rodriguez:              The patient is a 78 y.o. male who presents with a fall and MS change. Has had brain mass and dementia. Pt had fall, and admitted for care. Has large left forehead hematoma. Concern has developed of abd distension. Pt responds to questions, knows self, oriented in general to situation and follows commands, but not interactive or opening eyes without stimuli. Denies pain at this time, denies emesis. Uncertain of BM. Denies prior intestinal surgery.     Past Medical History:        Diagnosis Date    Arthritis     Brain aneurysm     Dementia ([address]     Enlarged prostate     Essential hypertension     Foot pain 03/30/2021    complaints of foot pain today    History of intracranial hemorrhage     Overactive bladder     Pituitary mass (HCC)     Right leg DVT (HCC)        Past Surgical History:        Procedure Laterality Date    APPENDECTOMY  1959    MOUTH SURGERY  2020    molars removed       Current Medications:   Current Facility-Administered Medications: vancomycin 1000 mg IVPB in 250 mL D5W addavial, 1,000 mg, Intravenous, Q24H  dextrose 5 % in lactated ringers infusion, , Intravenous, Continuous  glucose (GLUTOSE) 40 % oral gel 15 g, 15 g, Oral, PRN  dextrose 50 % IV solution, 12.5 g, Intravenous, PRN  glucagon (rDNA) injection 1 mg, 1 mg, Intramuscular, PRN  dextrose 5 % solution, 100 mL/hr, Intravenous, PRN  metoclopramide (REGLAN) injection 5 mg, 5 mg, Intravenous, Q6H  metronidazole (FLAGYL) 500 mg in NaCl 100 mL IVPB premix, 500 mg, Intravenous, Q8H  ciprofloxacin (CIPRO) IVPB 400 mg, 400 mg, Intravenous, Q12H  sodium chloride flush 0.9 % injection 5-40 mL, 5-40 mL, Intravenous, 2 times per day  sodium chloride flush 0.9 % injection 5-40 mL, 5-40 mL, Intravenous, PRN  rosuvastatin (CRESTOR) tablet 10 mg, 10 mg, Oral, Nightly  0.9 % sodium chloride infusion, 25 mL, Intravenous, PRN  ondansetron (ZOFRAN-ODT) disintegrating tablet 4 mg, 4 mg, Oral, Q8H PRN **OR** ondansetron (ZOFRAN) injection 4 mg, 4 mg, Intravenous, Q6H PRN  polyethylene glycol (GLYCOLAX) packet 17 g, 17 g, Oral, Daily PRN  acetaminophen (TYLENOL) tablet 650 mg, 650 mg, Oral, Q6H PRN **OR** acetaminophen (TYLENOL) suppository 650 mg, 650 mg, Rectal, Q6H PRN  trospium (SANCTURA) tablet 20 mg, 20 mg, Oral, BID  Prior to Admission medications    Medication Sig Start Date End Date Taking? Authorizing Provider   dilTIAZem BELCHER [facility]) 120 MG extended release capsule Take 3 capsules by mouth daily 7/20/21  Yes Belkis Choudhury MD   trospium (SANCTURA) 20 MG tablet Take 20 mg by mouth 2 times daily   Yes Historical Provider, MD   vitamin D (CHOLECALCIFEROL) 25 MCG (1000 UT) TABS tablet Take 1,000 Units by mouth daily   Yes Historical Provider, MD   oxybutynin (DITROPAN-XL) 5 MG extended release tablet Take 5 mg by mouth daily   Yes Historical Provider, MD   tamsulosin (FLOMAX) 0.4 MG capsule Take 0.4 mg by mouth nightly   Yes Historical Provider, MD   rosuvastatin (CRESTOR) 20 MG tablet Take 10 mg by mouth daily    Yes Historical Provider, MD   apixaban (ELIQUIS) 5 MG TABS tablet Take 5 mg by mouth 2 times daily   Yes Historical Provider, MD   fluticasone (FLONASE) 50 MCG/ACT nasal spray 1 spray by Nasal route daily    Historical Provider, MD        Allergies:  Keflex [cephalexin]    Social History:    reports that he has never smoked. He has never used smokeless tobacco. He reports that he does not drink alcohol and does not use drugs.     Family History:        Problem Relation Age of Onset    Diabetes Sister        REVIEW OF SYSTEMS:  CONSTITUTIONAL:  positive for  fatigue and malaise  HEENT:  negative  RESPIRATORY:  negative  CARDIOVASCULAR: negative  GASTROINTESTINAL:  negative except for abdominal distention  GENITOURINARY:  negative  HEMATOLOGIC/LYMPHATIC:  negative  NEUROLOGICAL:  negative  SKIN: negative    PHYSICAL EXAM:  VITALS:  /77   Pulse 92   Temp 97.6 °F (36.4 °C) (Axillary)   Resp 16   Ht 5' 4\" (1.626 m)   Wt 144 lb 13.5 oz (65.7 kg)   SpO2 94%   BMI 24.86 kg/m²   24HR INTAKE/OUTPUT:  No intake or output data in the 24 hours ending 07/25/21 1017  DRAIN/TUBE OUTPUT:     CONSTITUTIONAL:  somnolent, no apparent distress and normal weight  EYES:  sclera clear  ENT:  Left forehead hematoma, tender with local bruising, no signs of infection  NECK:  supple, symmetrical, trachea midline and no carotid bruits  LUNGS:  clear to auscultation  CARDIOVASCULAR:  regular rate and rhythm and no murmur noted  ABDOMEN:  tympanitic, hypoactive bowel sounds, soft, distended, non-tender, voluntary guarding absent, no masses palpated, no hepatosplenomegally and hernia absent  MUSCULOSKELETAL:  0+ pitting edema lower extremities  NEUROLOGIC:  Mental Status Exam:  Level of Alertness:   awake  Orientation:   person, states it's 2020, and at the [facility]  SKIN:  no bruising or bleeding, normal skin color, texture, turgor and no redness, warmth, or swelling    DATA:    CBC:   Recent Labs     07/23/21  0515 07/24/21  0453 07/25/21  0513   WBC 7.0 9.1 8.0   HGB 13.6 14.3 12.6*   HCT 39.4* 41.3 36.5*   * 128* 122*     BMP:    Recent Labs     07/23/21  0515 07/24/21  0453 07/25/21  0513    136 135*   K 3.9 3.6 3.4*    101 100   CO2 21 21 26   BUN 11 9 10   CREATININE 1.1 1.0 1.3   GLUCOSE 61* 48* 96     Hepatic: No results for input(s): AST, ALT, ALB, BILITOT, ALKPHOS in the last 72 hours. Mag:      Recent Labs     07/23/21  0515 07/24/21 0453   MG 1.80 1.70*      Phos:   No results for input(s): PHOS in the last 72 hours. INR: No results for input(s): INR in the last 72 hours.   LIPASE: No results for input(s): LIPASE in the last 72 hours.   AMYLASE: No results for input(s): AMYLASE in the last 72 hours. Radiology Review:      XR ABDOMEN (KUB) (SINGLE AP VIEW)    Result Date: 7/25/2021  EXAMINATION: ONE SUPINE XRAY VIEW(S) OF THE ABDOMEN 7/25/2021 8:14 am COMPARISON: 07/24/2021 radiograph HISTORY: ORDERING SYSTEM PROVIDED HISTORY: Abdominal distension TECHNOLOGIST PROVIDED HISTORY: Reason for exam:->Abdominal distension Reason for Exam: distention Acuity: Unknown Type of Exam: Subsequent/Follow-up FINDINGS: Stable pattern of gaseous distention of the stomach. Distal to this, there are nondistended air-filled loops of small bowel which show relative stability from the prior exam.  Stool and air are mixed in the colon extending distally to the rectum. No evidence of pneumoperitoneum. No abnormal soft tissue mass or calcification. No skeletal abnormality. Stable appearance of the abdomen with gaseous distention of the stomach and scattered nondistended air-filled loops of bowel throughout. XR ABDOMEN (KUB) (SINGLE AP VIEW)    Result Date: 7/24/2021  EXAMINATION: ONE SUPINE XRAY VIEW(S) OF THE ABDOMEN 7/24/2021 4:53 pm COMPARISON: 07/22/2021. HISTORY: ORDERING SYSTEM PROVIDED HISTORY: Abdominal pain TECHNOLOGIST PROVIDED HISTORY: Reason for exam:->Abdominal pain Reason for Exam: Abdominal pain Acuity: Acute Type of Exam: Initial FINDINGS: Gas and stool throughout the colon. Mild retained stool in the rectosigmoid region. The distention of the sigmoid colon noted previously has significantly improved. Moderate gastric distention is now noted. No focally distended small bowel loops. No pathologic calcifications     Moderate gastric distension. Otherwise nonspecific bowel-gas pattern.  Improved distention of the sigmoid colon       IMPRESSION/RECOMMENDATIONS:    Generalized ileus, and fecal constipation due to metabolic encephalopathy, and immobility  No mechanical obstruction by exam or AXR  Continue Reglan and enemas  Try to mobilize as much as possible  Follow up AXR in am. Will proceed with contrast study if gastric distension persists    No intervention required for forehead hematoma at this time    Thank you,      Willie Avalos MD

## 2021-07-25 NOTE — PROGRESS NOTES
Occupational Therapy  Facility/Department: [facility] 3A NURSING  Daily Treatment Note  NAME: Vishnu Caro  : 1940  MRN: [id number]    Date of Service: 2021    Discharge Recommendations: Vishnu Caro scored a 13/24 on the AM-PAC ADL Inpatient form. Current research shows that an AM-PAC score of 17 or less is typically not associated with a discharge to the patient's home setting. Based on the patient's AM-PAC score and their current ADL deficits, it is recommended that the patient have 3-5 sessions per week of Occupational Therapy at d/c to increase the patient's independence. Please see assessment section for further patient specific details. If patient discharges prior to next session this note will serve as a discharge summary. Please see below for the latest assessment towards goals. OT Equipment Recommendations  Other: Defer to d/c location    Assessment   Performance deficits / Impairments: Decreased functional mobility ; Decreased ADL status; Decreased safe awareness;Decreased balance;Decreased high-level IADLs  Assessment: Pt is an [address] y.o. M with hx of dementia who had a pacemaker placed . Ptis making progress towards his goals, but he  is currently below functional baseline and would benefit from continued acute OT to progress functional outcomes. Treatment Diagnosis: NAMRATA  OT Education: OT Role;Plan of Care;Precautions;Orientation;Transfer Training;ADL Adaptive Strategies  Patient Education: Will require repetition  Barriers to Learning: cognition  REQUIRES OT FOLLOW UP: Yes  Activity Tolerance  Activity Tolerance: Patient Tolerated treatment well;Treatment limited secondary to medical complications (free text)  Activity Tolerance: Decreased alertness in stance at RW when returning back to bed. Safety Devices  Safety Devices in place: Yes  Type of devices: Left in bed;Call light within reach; Bed alarm in place;Gait belt;Nurse notified; All fall risk precautions in place; Patient at risk for falls (safety camera on)  Restraints  Initially in place: No         Patient Diagnosis(es): The primary encounter diagnosis was Syncope and collapse. Diagnoses of Hematoma of scalp, initial encounter, Anticoagulated, Brain mass, and Elevated serum creatinine were also pertinent to this visit. has a past medical history of Arthritis, Brain aneurysm, Dementia ([facility] [address] Enlarged prostate, Essential hypertension, Foot pain, History of intracranial hemorrhage, Overactive bladder, Pituitary mass ([facility] [address] and Right leg DVT ([facility] [address] has a past surgical history that includes Appendectomy (1959) and Mouth surgery (2020). Restrictions  Restrictions/Precautions  Restrictions/Precautions: Cardiac, Fall Risk (Pacemaker, High fall risk)  Required Braces or Orthoses?: Yes (no lifting, no overhead/shoulder flexion overhead x 30 days)  Implants present? : Pacemaker  Position Activity Restriction  Other position/activity restrictions: Svitlana Cho is a 78 y.o. male who presents to the emergency department after a syncopal episode with head injury. Patient has history of dementia. Presents here with his daughter who he lives with and is his caregiver. Daughter states that he was coming out of his room using his cane when all of a sudden he became wobbly and then fell face forward and had a syncopal episode. He is anticoagulated on Eliquis with history of DVT in his right leg. Has history of a brain aneurysm and pituitary mass. Patient denies any pain at this time. Daughter states he has been eating less over the past few days. Has some abrasions over his face. Denies visual changes, headache, chest pain, shortness breath, abdominal pain, extremity pain, numbness or difficulty moving his extremities. He is alert to person and place but not time. Daughter states this is normal for him with his history of dementia.   Subjective   General  Chart Reviewed: Yes, Progress Notes  Patient assessed for rehabilitation services?: Yes  Additional Pertinent Hx: hx of dementia  Response to previous treatment: Patient with no complaints from previous session  Family / Caregiver Present: No  Diagnosis: NAMRATA (acute kidney injury) ([facility] [address]  Subjective  Subjective: Pt supine in bed sleeping on arival. Pt awakened and agreeable to OT eval. Pt denies pain. General Comment  Comments: Tends to keep eyes closed but will open them when cued to open eyes. Vital Signs  Patient Currently in Pain: Denies   Orientation  Orientation  Orientation Level: Disoriented to situation;Oriented to place;Oriented to person;Disoriented to time  Objective    ADL  Grooming: Setup; Increased time to complete (wash face)  UE Bathing: Setup;Verbal cueing; Increased time to complete;Stand by assistance  LE Bathing: Maximum assistance;Verbal cueing  UE Dressing: Moderate assistance (hospital gown)  Toileting: Maximum assistance; Increased time to complete  Additional Comments: Patient ambulated with RW x 15 feet from bed into bathroom-- CGA with RW and cues to turn. Performed sponge bathing and dressing on BS over commode. Patient incont of urine in brief. Balance  Sitting Balance: Stand by assistance  Standing Balance  Time: @ 3 minutes x 2  Activity: Ambulated x 15 feet from bed into bathroom. ADL performed. Upon returning to bed patient had decreased responsiveness and needed max of 2 to follow commands and turn to sit in chair (1 therapist pulled chair up next to patient while another therapist held patient in stance position due to bilateral knees buckling). Comment: Sat in chair-- BP 96/64 . Patient c/o dizziness. After 5 minutes sitting in chair /64. Min assist to stand step t/f from chair to bed.  Charge nurse Keyur Abdalla answered call light and aware  Functional Mobility  Functional - Mobility Device: Rolling Walker  Activity: To/from bathroom  Assist Level: Contact guard assistance  Functional Mobility Comments: CGA to bathroom with RW, max of 2 after taking @ 5 steps returning to bed and patient had decreased alertness. Charge nurse aware, returned to bed with all needs met  Toilet Transfers  Toilet - Technique: Ambulating  Equipment Used: Standard bedside commode  Toilet Transfer: Contact guard assistance  Toilet Transfers Comments: BSC over commode in bathroom     Transfers  Stand Step Transfers: Minimal assistance  Sit to stand: Minimal assistance  Stand to sit: Minimal assistance                       Cognition  Arousal/Alertness: Appropriate responses to stimuli  Following Commands: Follows one step commands with repetition; Follows one step commands with increased time  Attention Span: Difficulty attending to directions  Memory: Decreased recall of biographical Information;Decreased recall of precautions;Decreased recall of recent events;Decreased short term memory  Safety Judgement: Decreased awareness of need for assistance;Decreased awareness of need for safety  Problem Solving: Assistance required to generate solutions;Assistance required to implement solutions;Assistance required to identify errors made  Insights: Not aware of deficits  Initiation: Requires cues for some  Sequencing: Requires cues for some     Perception  Overall Perceptual Status: [facility]                                   Plan   Plan  Times per week: 3-5x  Times per day: Daily  Current Treatment Recommendations: Functional Mobility Training, Balance Training, Safety Education & Training, Self-Care / ADL, Home Management Training, Patient/Caregiver Education & Training, Endurance Training  G-Code     OutComes Score                                                  AM-PAC Score        AM-[facility] Inpatient Daily Activity Raw Score: 13 (07/25/21 1345)  AM-PAC Inpatient ADL T-Scale Score : 32.03 (07/25/21 1345)  ADL Inpatient CMS 0-100% Score: 63.03 (07/25/21 1345)  ADL Inpatient CMS G-Code Modifier : CL (07/25/21 1345)    Goals  Short term goals  Time Frame for Short term goals: d/c  Short

## 2021-07-25 NOTE — PROGRESS NOTES
Physical Therapy  Facility/Department: [address] NURSING  Daily Treatment Note  NAME: Duran Obando  : 1940  MRN: [id number]    Date of Service: 2021    Discharge Recommendations: Duran Obando scored a 16/24 on the AM-PAC short mobility form. Current research shows that an AM-PAC score of 17 or less is typically not associated with a discharge to the patient's home setting. Based on the patient's AM-PAC score and their current functional mobility deficits, it is recommended that the patient have 3-5 sessions per week of Physical Therapy at d/c to increase the patient's independence. Please see assessment section for further patient specific details. If patient discharges prior to next session this note will serve as a discharge summary. Please see below for the latest assessment towards goals. 3-5 sessions per week   PT Equipment Recommendations  Equipment Needed: No    Assessment   Body structures, Functions, Activity limitations: Decreased functional mobility ; Decreased balance;Decreased cognition;Decreased endurance;Decreased strength;Decreased ADL status  Assessment: Pt intially did well, standing and ambulating with CGA prior to toileting tasks. Pt then went unresponsive while ambulating after ADL's, legs started to give out but pt able to maintain standing with assistance from OT. Pt hypotensive with inc HR measured once sitting in chair. Pt appear to progressing in mobility, but due to medical complications, still very unsafe to perform functional mobility tasks independently and pt would benefit from continued skilled PT to further address listed impairments for return to PLOF and decreased fall risk  Treatment Diagnosis: decreased functional mobility associated with NAMRATA and insertion of pacemaker  Prognosis: Fair  Clinical Presentation: evolving  PT Education: PT Role;Transfer Training;General Safety;Orientation;Goals;Plan of Care;Precautions; Energy Conservation;Gait Training;Functional Mobility Training  Patient Education: pt verbalized understanding, will require reinforcement  Barriers to Learning: cognitive  REQUIRES PT FOLLOW UP: Yes  Activity Tolerance  Activity Tolerance: Patient limited by fatigue;Patient limited by endurance  Activity Tolerance: Pt difficult to maintain eye contact, was lethargic intermittently throughout treatment, closing eyes at times. Patient Diagnosis(es): The primary encounter diagnosis was Syncope and collapse. Diagnoses of Hematoma of scalp, initial encounter, Anticoagulated, Brain mass, and Elevated serum creatinine were also pertinent to this visit. has a past medical history of Arthritis, Brain aneurysm, Dementia ([address] Enlarged prostate, Essential hypertension, Foot pain, History of intracranial hemorrhage, Overactive bladder, Pituitary mass ([address] and Right leg DVT ([address] has a past surgical history that includes Appendectomy (1959) and Mouth surgery (2020). Restrictions  Restrictions/Precautions  Restrictions/Precautions: Cardiac, Fall Risk (Pacemaker, High fall risk)  Required Braces or Orthoses?: Yes (no lifting, no overhead/shoulder flexion overhead x 30 days)  Implants present? : Pacemaker  Position Activity Restriction  Other position/activity restrictions: Duran Obando is a 78 y.o. male who presents to the emergency department after a syncopal episode with head injury. Patient has history of dementia. Presents here with his daughter who he lives with and is his caregiver. Daughter states that he was coming out of his room using his cane when all of a sudden he became wobbly and then fell face forward and had a syncopal episode. He is anticoagulated on Eliquis with history of DVT in his right leg. Has history of a brain aneurysm and pituitary mass. Patient denies any pain at this time. Daughter states he has been eating less over the past few days. Has some abrasions over his face.   Denies visual changes, hand placement when transferring with RW  Ambulation  Ambulation?: Yes  More Ambulation?: No  Ambulation 1  Surface: level tile  Device: Rolling Walker  Assistance: Contact guard assistance  Quality of Gait: wide Irais, decreased klever, B genu varum noted  Gait Deviations: Slow Klever;Decreased step length  Distance: 10 ft + 5 ft  Comments: Pt with CGA from EOB to commode.  After using commode and performing ADL's, attempted walking, but pt became unresponsive, appeared legs started to give out, OT was able to maintain pt in standing while PT grabbed chair, pt with difficulty following commands to turn to sit in chair requiring modx2 to sit  Stairs/Curb  Stairs?: No     Balance  Posture: Fair  Sitting - Static: Good;-  Sitting - Dynamic: Good;-  Standing - Static: Fair;+  Standing - Dynamic: Fair;+  Comments: Pt able to perform toileting tasks and ADL's while seated on commode with SBA                           G-Code     OutComes Score                                                     AM-PAC Score  AM-PAC Inpatient Mobility Raw Score : 16 (07/25/21 1348)  AM-PAC Inpatient T-Scale Score : 40.78 (07/25/21 1348)  Mobility Inpatient CMS 0-100% Score: 54.16 (07/25/21 1348)  Mobility Inpatient CMS G-Code Modifier : CK (07/25/21 1348)          Goals  Short term goals  Time Frame for Short term goals: prior to discharge  Short term goal 1: pt will be able to ambulate with handheld/cane 20ft with CG  Short term goal 2: pt will be able to ambulate flight of stairs with cane and Gavin  Short term goal 3: pt will be able to sit to/from stand independently  Short term goal 4: pt will be able to perform bed mobility independently  Patient Goals   Patient goals : return home    Plan    Plan  Times per week: 3-5  Times per day: Daily  Current Treatment Recommendations: Transfer Training, Endurance Training, Strengthening, ROM, Balance Training, Gait Training, Functional Mobility Training, Cognitive Reorientation, Patient/Caregiver Education & Training, Neuromuscular Re-education, Safety Education & Training, Home Exercise Program  Safety Devices  Type of devices:  All fall risk precautions in place, Bed alarm in place, Call light within reach, Gait belt, Patient at risk for falls, Left in bed, Nurse notified, Dee Dee White in use  Restraints  Initially in place: No     Therapy Time   Individual Concurrent Group Co-treatment   Time In 1250         Time Out 1328         Minutes 38         Timed Code Treatment Minutes: Zunilda Leon, PT

## 2021-07-25 NOTE — PLAN OF CARE
Problem: Falls - Risk of:  Goal: Will remain free from falls  Description: Will remain free from falls  Outcome: Ongoing     Problem: Falls - Risk of:  Goal: Absence of physical injury  Description: Absence of physical injury  Outcome: Ongoing     Problem: Pain:  Goal: Pain level will decrease  Description: Pain level will decrease  Outcome: Ongoing     Problem: Pain:  Goal: Control of acute pain  Description: Control of acute pain  Outcome: Ongoing     Problem: Fluid Volume:  Goal: Ability to achieve a balanced intake and output will improve  Description: Ability to achieve a balanced intake and output will improve  Outcome: Ongoing     Problem: Physical Regulation:  Goal: Ability to maintain clinical measurements within normal limits will improve  Description: Ability to maintain clinical measurements within normal limits will improve  Outcome: Ongoing

## 2021-07-25 NOTE — PROGRESS NOTES
Hospitalist Progress Note      PCP: Sophia Mtz MD    Date of Admission: 7/17/2021    Chief Complaint: [facility] Course: 79 yo M with history of brain aneurysm, sellar mass, h/o DVT on Eliquis, dementia, CKD 3 who was brought to ER for syncope and fall. In ED, found to have NAMRATA on CKD and scalp hematoma. Admitted as inpatient for further management. Noted to be in Mobitz II, s/p PPM on 7/19/21. SPECT requested per Cardio to r/o CAD. Developed fevers on evening of 7/20 and has more swelling in L PPM pocket on 7/21/21; Eliquis held on 7/21/21. Noted to have atelectasis and fecal impaction. Molasses enema ordered on 7/21. Will go to [facility] SNF upon DC. SPECT negative on 7/22. ID consulted for fevers. Neuro and NS consult consulted for waxing waning mental status and possible relation to intracranial mass; recommend outpatient f/u. ID recommended Cipro and Flagyl. Started on Vanco IV for L arm cellulitis on 7/24. Has been hypoglycemic, started on D5 IVF and POCT with hypoglycemia orders PRN. Kept NPO, started IV Reglan, ordered NGT and molasses enema. Surgery consulted. Discussed extensively with daughter on phone. Changed to DNR-CCA. Repeat KUB planned on 7/26. SNF upon DC; hopefully in by 5/53/65 (awaiting precert)    Subjective:   Patient with no fevers. Awake. No CP, HA or abdominal pain. Wants to go to SNF to try to get better.       Medications:  Reviewed    Infusion Medications    dextrose 5% in lactated ringers 80 mL/hr at 07/24/21 1820    dextrose      sodium chloride       Scheduled Medications    vancomycin  1,000 mg Intravenous Q24H    metoclopramide  5 mg Intravenous Q6H    metroNIDAZOLE  500 mg Intravenous Q8H    ciprofloxacin  400 mg Intravenous Q12H    sodium chloride flush  5-40 mL Intravenous 2 times per day    rosuvastatin  10 mg Oral Nightly    trospium  20 mg Oral BID     PRN Meds: glucose, dextrose, glucagon (rDNA), dextrose, sodium chloride flush, sodium chloride, ondansetron **OR** ondansetron, polyethylene glycol, acetaminophen **OR** acetaminophen    No intake or output data in the 24 hours ending 07/25/21 1506    Physical Exam Performed:    /72   Pulse 98   Temp 98 °F (36.7 °C) (Oral)   Resp 16   Ht 5' 4\" (1.626 m)   Wt 144 lb 13.5 oz (65.7 kg)   SpO2 96%   BMI 24.86 kg/m²     General appearance: No apparent distress, appears stated age and cooperative. HEENT: Pupils equal, round, and reactive to light. Conjunctivae/corneas clear. Neck: Supple, with full range of motion. No jugular venous distention. Trachea midline. Respiratory:  Normal respiratory effort. Clear to auscultation, bilaterally without Rales/Wheezes/Rhonchi. Cardiovascular: Regular rate and rhythm with normal S1/S2 without murmurs, rubs or gallops. L PPM site is swollen  Abdomen: Soft, non-tender, mildly distended with normal bowel sounds. Musculoskeletal: No clubbing, cyanosis or edema bilaterally. Full range of motion without deformity. Skin: Skin color, texture, turgor normal.  No rashes or lesions. Neurologic:  Neurovascularly intact without any focal sensory/motor deficits. Cranial nerves: II-XII intact, grossly non-focal.  Psychiatric: Alert and oriented, thought content appropriate, normal insight  Capillary Refill: Brisk,3 seconds, normal   Peripheral Pulses: +2 palpable, equal bilaterally       Labs:   Recent Labs     07/23/21  0515 07/24/21  0453 07/25/21  0513   WBC 7.0 9.1 8.0   HGB 13.6 14.3 12.6*   HCT 39.4* 41.3 36.5*   * 128* 122*     Recent Labs     07/23/21  0515 07/24/21  0453 07/25/21  0513    136 135*   K 3.9 3.6 3.4*    101 100   CO2 21 21 26   BUN 11 9 10   CREATININE 1.1 1.0 1.3   CALCIUM 8.9 8.9 8.6     No results for input(s): AST, ALT, BILIDIR, BILITOT, ALKPHOS in the last 72 hours. No results for input(s): INR in the last 72 hours.   No results for input(s): [place] Dec in the last 72 hours.    Urinalysis:      Lab Results   Component Value Date    NITRU Negative 07/21/2021    WBCUA 1 07/21/2021    RBCUA 3 07/21/2021    BLOODU Negative 07/21/2021    SPECGRAV 1.015 07/21/2021    GLUCOSEU Negative 07/21/2021       Radiology:  XR ABDOMEN (KUB) (SINGLE AP VIEW)   Final Result   Stable appearance of the abdomen with gaseous distention of the stomach and   scattered nondistended air-filled loops of bowel throughout. XR ABDOMEN (KUB) (SINGLE AP VIEW)   Final Result   Moderate gastric distension. Otherwise nonspecific bowel-gas pattern. Improved distention of the sigmoid colon         XR CHEST PORTABLE   Final Result   Improved aeration of the lung bases. Bandlike opacity remains on the right,   likely atelectasis given its bandlike morphology         XR ABDOMEN (KUB) (SINGLE AP VIEW)   Final Result   Slowly resolving constipation with decreasing bowel dilatation which could   represent a resolving partial obstruction or ileus. NM MYOCARDIAL SPECT REST EXERCISE OR RX   Final Result      XR ABDOMEN (KUB) (SINGLE AP VIEW)   Final Result   Large amount of stool in the colon suggests constipation. Dilated air-filled   loops of small bowel and colon in association. Fecal impaction may be   considered clinically. CT CHEST WO CONTRAST   Final Result   De pendant opacity and bandlike opacities are seen at the lung bases, with   atelectasis favored over pneumonia. Trace pleural effusions are seen. Lytic and sclerotic appearing T9 vertebral body. Some certain measuring not   this represents a typical hemangioma common etiology such as Paget's disease,   or metastatic disease. Consider further characterization with bone scan. CT HEAD WO CONTRAST   Final Result   No acute intracranial abnormality. 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 Enlarging left frontal scalp hematoma.          XR CHEST PORTABLE   Final Result Interval development of bibasilar airspace disease, atelectasis favored over   pneumonia. XR CHEST PORTABLE   Final Result   No acute cardiopulmonary disease. No left-sided pneumothorax following pacer   placement. MRI BRAIN WO CONTRAST   Final Result   1. Left frontal scalp hematoma/edema. No evidence of acute intracranial   hemorrhage. 2. Again seen heterogeneous sellar/suprasellar with mass effect on the optic   chiasm and extension into the left sphenoid sinus. Evaluation is limited   secondary to lack of intravenous contrast (acute kidney injury). 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 3. Involution parenchymal changes with moderate microvascular ischemic   disease. 4. No evidence of acute infarct or midline shift. CT FACIAL BONES WO CONTRAST   Final Result   No acute traumatic injury of the facial bones. CT Cervical Spine WO Contrast   Final Result   No acute abnormality of the cervical spine. Multilevel degenerative disc disease with mild multilevel facet arthropathy. CT Head WO Contrast   Final Result   1. No acute intracranial abnormality. 2. Small left forehead contusion/scalp hematoma. 3. 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 4. Senescent changes. XR CHEST PORTABLE   Final Result   No radiographic evidence of acute cardiopulmonary disease.                  Assessment/Plan:    Active Hospital Problems    Diagnosis     Left arm cellulitis [G95.847]     Hypoglycemia [E16.2]     Encephalopathy, metabolic [B32.98]     PAF (paroxysmal atrial fibrillation) (HCC) [I48.0]     Hematoma of scalp [S00.03XA]     Anticoagulated [Z79.01]     Brain mass [G93.89]     Elevated serum creatinine [R79.89]     Lytic lesion of bone on x-ray [M89.9]     CKD (chronic kidney disease) stage 3, GFR 30-59 ml/min ([facility] [N18.30]     HTN (hypertension), benign [I10]     High fever [R50.9]     Fecal impaction (HCC) [K56.41]     Dementia ([facility] [F03.90]     History of DVT (deep vein thrombosis) [Z86.718]     Atelectasis [J98.11]     S/P placement of cardiac pacemaker [Z95.0]     Hyponatremia [E87.1]     AV block, Mobitz 2 [I44.1]     NAMRATA (acute kidney injury) ([facility] [N17.9]     Syncope and collapse [R55]      1. Cipro and Flagyl IV per ID  2. Resume Eliquis in next 24-48 hrs if bowel function recovers and ok with Surgery  3. Continue Vanco IV for L arm cellulitis per PharmD  4. NO NARCOTICS OR NEUROSEDATING MEDS  5. POCT glucose q6h and PRN; hypoglycemia orders added  6. Stopped Ditropan and Flomax given recurrent orthostasis; monitor for urinary retention and place Douglas if PVR > 300 cc  7. Neurosurgery consult for sellar mass appreciated; follow up with Dr Delroy Mccormick as OP  8. Repeat KUB in AM  9. SPECT negative  10. Place NGT  11. Continue Reglan 5 mg IV q6h  12. Surgery consult for stomach distension appreciated  13. SCD  14. SNF upon DC      DVT Prophylaxis: SCD  Diet: Diet NPO  Code Status: DNR-CCA    PT/OT Eval Status: Following    Dispo - [facility]    Discussed with patient and nursing. Discussed with daughter on phone. This patient has a waxing and waning condition. Fevers have finally abated. As soon as we regain bowel function and he eats, would proceed to get to SNF. We are awaiting pre cert.     Evelio Tucker MD

## 2021-07-25 NOTE — ACP (ADVANCE CARE PLANNING)
Advanced Care Planning Note. Purpose of Encounter: Advanced care planning in light of acute metabolic encephalopathy  Parties In Attendance: Patient, daughter on phone  Decisional Capacity: Limited  Subjective: Patient denies CP, SOB  Objective: Cr 1.3  Goals of Care Determination: Patient wants full support. Daughter (POA) wants limited support (NO CPR, no vent, no HD, no trach, no PEG, no surgery)  Plan:  Soap enema, IV Abx. Repeat KUB. Surgery consult, SNF placement  Code Status: DNR CCA   Time spent on Advanced care Plannin minutes  Advanced Care Planning Documents: Completed advanced directives on chart, children (son and daughter) share the POA.     Alayna Galo MD  2021 3:15 PM

## 2021-07-26 NOTE — PLAN OF CARE
Problem: Falls - Risk of:  Goal: Will remain free from falls  Description: Will remain free from falls  7/26/2021 0050 by Shaquille Giordano RN  Outcome: Ongoing  7/25/2021 1104 by Alyssa Shaikh RN  Outcome: Ongoing  Goal: Absence of physical injury  Description: Absence of physical injury  7/26/2021 0050 by Shaquille Giordano RN  Outcome: Ongoing  7/25/2021 1104 by Alyssa Shaikh RN  Outcome: Ongoing     Problem: Pain:  Goal: Pain level will decrease  Description: Pain level will decrease  7/26/2021 0050 by Shaquille Giordano RN  Outcome: Ongoing  7/25/2021 1104 by Alyssa Shaikh RN  Outcome: Ongoing  Goal: Control of acute pain  Description: Control of acute pain  7/26/2021 0050 by Shaquille Giordano RN  Outcome: Ongoing  7/25/2021 1104 by Alyssa Shaikh RN  Outcome: Ongoing  Goal: Control of chronic pain  Description: Control of chronic pain  Outcome: Ongoing     Problem: Fluid Volume:  Goal: Ability to achieve a balanced intake and output will improve  Description: Ability to achieve a balanced intake and output will improve  7/26/2021 0050 by Shaquille Giordano RN  Outcome: Ongoing  7/25/2021 1104 by Alyssa Shaikh RN  Outcome: Ongoing     Problem: Physical Regulation:  Goal: Ability to maintain clinical measurements within normal limits will improve  Description: Ability to maintain clinical measurements within normal limits will improve  7/26/2021 0050 by Shaquille Giordano RN  Outcome: Ongoing  7/25/2021 1104 by Alyssa Shaikh RN  Outcome: Ongoing  Goal: Will show no signs and symptoms of electrolyte imbalance  Description: Will show no signs and symptoms of electrolyte imbalance  Outcome: Ongoing     Problem: Cardiovascular  Goal: No DVT, peripheral vascular complications  Outcome: Ongoing  Goal: Hemodynamic stability  Outcome: Ongoing  Goal: Anticoagulate/Hct stable  Outcome: Ongoing  Goal: Agreement to quit smoking  Outcome: Ongoing  Goal: Weight maintained or lost  Outcome: Ongoing  Goal: Understanding of dietary restrictions  Outcome: Ongoing     Problem: Skin Integrity:  Goal: Will show no infection signs and symptoms  Description: Will show no infection signs and symptoms  Outcome: Ongoing  Goal: Absence of new skin breakdown  Description: Absence of new skin breakdown  Outcome: Ongoing     Problem: Musculor/Skeletal Functional Status  Goal: Highest potential functional level  Outcome: Ongoing  Goal: Absence of falls  Outcome: Ongoing     Problem: Nutrition  Goal: Optimal nutrition therapy  Outcome: Ongoing     Problem:  Bowel/Gastric:  Goal: Ability to achieve a regular elimination pattern will improve  Description: Ability to achieve a regular elimination pattern will improve  Outcome: Ongoing  Goal: Control of bowel function will improve  Description: Control of bowel function will improve  Outcome: Ongoing  Goal: Will not experience complications related to bowel motility  Description: Will not experience complications related to bowel motility  Outcome: Ongoing     Problem: Nutritional:  Goal: Maintenance of adequate nutrition will improve  Description: Maintenance of adequate nutrition will improve  Outcome: Ongoing

## 2021-07-26 NOTE — CONSULTS
Clinical Pharmacy Note: Pharmacy to Dose Vancomcyin    Vancomycin Day: 3  Current Dosing Regimen: 1000 mg q 24 hrs  Dosing Method: Bayesian Modeling    Random: 15.3    Recent Labs     07/25/21  0513 07/26/21  0609   BUN 10 8       Recent Labs     07/25/21  0513 07/26/21  0609   CREATININE 1.3 1.2       Recent Labs     07/25/21  0513 07/26/21  0541   WBC 8.0 8.1         Intake/Output Summary (Last 24 hours) at 7/26/2021 1128  Last data filed at 7/26/2021 1115  Gross per 24 hour   Intake 0 ml   Output --   Net 0 ml         Ht Readings from Last 1 Encounters:   07/20/21 5' 4\" (1.626 m)        Wt Readings from Last 1 Encounters:   07/26/21 149 lb 14.6 oz (68 kg)         Body mass index is 25.73 kg/m². Estimated Creatinine Clearance: 39 mL/min (based on SCr of 1.2 mg/dL). Assessment/Plan:  Vancomycin level is therapeutic. Level was drawn appropriately in respect to last dose given. Continue vancomycin regimen to 1000 mg  every 24 hours. Bayesian Modeling predicts an AUC of 477 mg/L*hr and trough of 14.6 mg/L. Changes in regimen will be determined based on culture results, renal function, and clinical response. Pharmacy will continue to monitor and adjust regimen as necessary.     Thank you for the consult,    Gloria Aviles PharmD, BCPS

## 2021-07-26 NOTE — PROGRESS NOTES
[facility]        Progress Note    Patient Name: Brandon Ford  MRN: [id number]  YOB: 1940  Date of Evaluation: 2021    Reason for Consult: Abdominal distention    Subjective:  No acute events overnight  Denies abdominal pain; not requiring analgesics  No nausea or vomiting  Passing stool  Resting in bed at this time; more alert today      Vital Signs:  Patient Vitals for the past 24 hrs:   BP Temp Temp src Pulse Resp SpO2 Height Weight   21 1225 -- -- -- -- -- -- 5' 4\" (1.626 m) --   21 1119 118/74 98.3 °F (36.8 °C) Oral 98 16 97 % -- --   21 0939 127/82 98.3 °F (36.8 °C) Oral 95 16 97 % -- --   21 0648 -- -- -- 95 -- -- -- --   21 0430 118/66 98.3 °F (36.8 °C) Oral 99 16 98 % -- 149 lb 14.6 oz (68 kg)   21 0200 -- -- -- 96 -- -- -- --   21 0000 130/79 98.3 °F (36.8 °C) Oral 94 18 95 % -- --   21 2232 -- -- -- 97 -- -- -- --   21 2000 124/72 98.6 °F (37 °C) Oral 101 18 95 % -- --   21 1545 120/76 98.2 °F (36.8 °C) Oral 102 16 96 % -- --      TEMPERATURE HISTORY 24H: Temp (24hrs), Av.3 °F (36.8 °C), Min:98.2 °F (36.8 °C), Max:98.6 °F (37 °C)    BLOOD PRESSURE HISTORY: Systolic (63JID), TGO:015 , Min:113 , CFP:626    Diastolic (41YPC), QOB:02, Min:66, Max:82      Intake/Output:  No intake/output data recorded. No intake/output data recorded.   Drain/tube Output:       Physical Exam:  General: awake, alert, loosely oriented  Cardiovascular:  regular rate and rhythm and no murmur noted  Lungs: clear to auscultation  Abdomen: soft, nontender, mildly distended, bowel sounds hypoactive     Labs:  CBC:    Recent Labs     21  0453 21  0513 21  0541   WBC 9.1 8.0 8.1   HGB 14.3 12.6* 12.2*   HCT 41.3 36.5* 34.9*   * 122* 150     BMP:    Recent Labs     21  0453 21  0513 21  0609    135* 137   K 3.6 3.4* 3.3*    100 102   CO2  26   BUN 9 10 8   CREATININE 1.0 1.3 1.2   GLUCOSE 48* 96 97     Hepatic:  No results for input(s): AST, ALT, ALB, BILITOT, ALKPHOS in the last 72 hours. Amylase:  No results found for: AMYLASE  Lipase:  No results found for: LIPASE   Mag:    Lab Results   Component Value Date    MG 1.70 07/24/2021    MG 1.80 07/23/2021     Phos:     Lab Results   Component Value Date    PHOS 2.5 04/01/2021    PHOS 3.0 03/31/2021      Coags:   Lab Results   Component Value Date    PROTIME 19.4 04/01/2021    INR 1.66 04/01/2021    APTT 33.9 03/30/2021       Cultures:  Anaerobic culture  No results found for: LABANAE  Fungus stain  No results found for requested labs within last 30 days. Gram stain  No results found for requested labs within last 30 days. Organism  No results found for: [facility]  Surgical culture  No results found for: CXSURG  Blood culture 1  Results in Past 30 Days  Result Component Current Result Ref Range Previous Result Ref Range   Blood Culture, Routine No Growth after 4 days of incubation. (7/21/2021)  Not in Time Range      Blood culture 2  Results in Past 30 Days  Result Component Current Result Ref Range Previous Result Ref Range   Culture, Blood 2 No Growth after 4 days of incubation. (7/21/2021)  Not in Time Range      Fecal occult  No results found for requested labs within last 30 days. GI bacterial pathogens by PCR  No results found for requested labs within last 30 days. C. difficile  No results found for requested labs within last 30 days.      Urine culture  No results found for: LABURIN    Pathology:  No relevant pathology     Imaging:  I have personally reviewed the following films:    XR ABDOMEN (2 VIEWS)    Result Date: 7/26/2021  EXAMINATION: TWO XRAY VIEWS OF THE ABDOMEN 7/26/2021 8:26 am COMPARISON: 07/25/2021 HISTORY: ORDERING SYSTEM PROVIDED HISTORY: Follow up gastric distention/ileus TECHNOLOGIST PROVIDED HISTORY: Reason for exam:->Follow up gastric distention/ileus Reason for Exam: Follow up gastric distention/ileus Acuity: Unknown Type of Exam: Unknown FINDINGS: Persistent but less pronounced gaseous distention of the stomach. Stable intestinal gas pattern. No free subdiaphragmatic air. Atelectasis in the lung bases     Decreased gastric distension     Scheduled Meds:   vancomycin  1,000 mg Intravenous Q24H    metoclopramide  5 mg Intravenous Q6H    metroNIDAZOLE  500 mg Intravenous Q8H    ciprofloxacin  400 mg Intravenous Q12H    sodium chloride flush  5-40 mL Intravenous 2 times per day    rosuvastatin  10 mg Oral Nightly    trospium  20 mg Oral BID     Continuous Infusions:   dextrose 5% in lactated ringers 80 mL/hr at 07/26/21 0530    dextrose      sodium chloride       PRN Meds:.glucose, dextrose, glucagon (rDNA), dextrose, sodium chloride flush, sodium chloride, ondansetron **OR** ondansetron, polyethylene glycol, acetaminophen **OR** acetaminophen      Assessment:  78 y.o. male admitted with   1. Syncope and collapse    2. Hematoma of scalp, initial encounter    3. Anticoagulated    4. Brain mass    5. Elevated serum creatinine        Generalized ileus, gastric distension  Constipation   Forehead hematoma  Syncope and collapse      Plan:  1. Decreased distension per AXR, exam relatively benign, mildly distended, passing stool; continue supportive care, check CT abdomen/pelvis with PO/IV contrast to further evaluate  2. NPO; monitor bowel function  3. IV hydration; monitor and correct electrolytes  4. Activity as tolerated  5. PRN analgesics and antiemetics--minimizing narcotics as tolerated  6. Management of medical comorbid etiologies per primary team and consulting services    EDUCATION:  Educated patient on plan of care and disease process--all questions answered. Plans discussed with patient and nursing. Reviewed and discussed with Dr. Rashi Oleary.       Signed:  GLENDY Lovell CNP  7/26/2021 2:27 PM     Surg Staff:     Pt seen and examined with NP  See full note above  Pt has had improved AXR, but is remaining distended and tympanitic on exam  Will plan to proceed with CT scan to evaluate further today  Continue hydrating fluids and prn meds    Lisette Reddy MD

## 2021-07-26 NOTE — PROGRESS NOTES
Comprehensive Nutrition Assessment    Type and Reason for Visit:  Reassess    Nutrition Recommendations/Plan:   If pt is to remain NPO, recommend parenteral nutrition. Nutrition Assessment:  Pt is nutritionally compromised AEB inadequate oral intake over past 8 days of admission. Off and on NPO status with most recently NPO since 7/24 d/t generalized ileus and gastric distension. IV fluids at 80 mL/hr. Recommend consideration of parenteral nutrition. Malnutrition Assessment:  Malnutrition Status:  Insufficient data        Estimated Daily Nutrient Needs:  Energy (kcal):  8496-0581; Weight Used for Energy Requirements:  Admission (64 kg)     Protein (g):  77-96 grams; Weight Used for Protein Requirements:   (1.2-1.5 grams per 64 kg)        Fluid (ml/day):   ; Method Used for Fluid Requirements:  1 ml/kcal      Nutrition Related Findings:  7/25 loose stool; disoriented x4      Wounds:  None       Current Nutrition Therapies:    Diet NPO  Additional Calorie Sources:   D5% in LR at 80 mL/hr provides 326 kcal    Anthropometric Measures:  · Height: 5' 4\" (162.6 cm)  · Current Body Weight: 149 lb (67.6 kg)   · Ideal Body Weight: 130 lbs; % Ideal Body Weight 114.6 %   · BMI: 25.6  · BMI Categories: Overweight (BMI 25.0-29. 9)       Nutrition Diagnosis:   · Inadequate oral intake related to altered GI function as evidenced by NPO or clear liquid status due to medical condition      Nutrition Interventions:   Food and/or Nutrient Delivery:  Continue NPO  Nutrition Education/Counseling:  Education not indicated   Coordination of Nutrition Care:  Continue to monitor while inpatient    Goals:  Diet adv vs alternative nutrition in next 24 - 48 hr based on POC       Nutrition Monitoring and Evaluation:   Behavioral-Environmental Outcomes:  None Identified   Food/Nutrient Intake Outcomes:  Diet Advancement/Tolerance  Physical Signs/Symptoms Outcomes:  GI Status     Discharge Planning:     Too soon to determine Electronically signed by Emili Prajapati RD, BRANDIE on 7/26/21 at 3:19 PM EDT  Contact: [phone]

## 2021-07-26 NOTE — PROGRESS NOTES
Infectious Diseases   Progress Note      Admission Date: 7/17/2021  Hospital Day: Hospital Day: 10   Attending: Chema Ortiz MD  Date of service: 7/26/2021     Chief complaint/ Reason for consult:     · High fever of 101 .9 on 7/20/2021 and 100.1 on 7/22/2021, source unclear, rule out aspiration  · Syncopal episode and fall at home before admission  · Chronic pituitary mass  · S/p dual-chamber pacemaker placement on 9/19/2021 for Mobitz type II block  · History of brain aneurysm    Microbiology:        I have reviewed allavailable micro lab data and cultures    Blood culture (2/2) - collected on 7/22/2021: In process    Antibiotics and immunizations:       Current antibiotics: All antibiotics and their doses were reviewed by me    Recent Abx Admin                   metronidazole (FLAGYL) 500 mg in NaCl 100 mL IVPB premix (mg) 500 mg New Bag 07/26/21 1413     500 mg New Bag  0534     500 mg New Bag 07/25/21 2236    ciprofloxacin (CIPRO) IVPB 400 mg (mg) 400 mg New Bag 07/26/21 1146     400 mg New Bag  0008    vancomycin 1000 mg IVPB in 250 mL D5W addavial (mg) 1,000 mg New Bag 07/25/21 2111                  Immunization History: All immunization history was reviewed by me today. There is no immunization history on file for this patient. Known drug allergies: All allergies were reviewed and updated    Allergies   Allergen Reactions    Keflex [Cephalexin] Hives       Social history:     Social History:  All social andepidemiologic history was reviewed and updated by me today as needed. · Tobacco use:   reports that he has never smoked. He has never used smokeless tobacco.  · Alcohol use:   reports no history of alcohol use. · Currently lives in: [facility] [address]  ·  reports no history of drug use.      COVID VACCINATION AND LAB RESULT RECORDS:     Internal Administration   First Dose      Second Dose           Last COVID Lab SARS-CoV-2, NAAT (no units)   Date Value   07/21/2021 Not Detected Assessment:     The patient is a 78 y.o. old male who  has a past medical history of Arthritis, Brain aneurysm, Dementia ([facility] [address] Enlarged prostate, Essential hypertension, Foot pain (03/30/2021), History of intracranial hemorrhage, Overactive bladder, Pituitary mass ([facility] [address] and Right leg DVT ([facility] [address] with following problems:    · High fever of 101 .9 on 7/20/2021 and 100.1 on 7/22/2021-fever responding to Cipro and Flagyl  · Syncopal episode and fall at home before admission  · Chronic pituitary mass-chronic and ongoing  · S/p dual-chamber pacemaker placement on 9/19/2021 for Mobitz type II block-blood cultures negative so far from yesterday  · History of brain aneurysm  · Essential hypertension-BP controlled  · History of intracranial hemorrhage  · History of right leg DVT  · Lytic lesions at T9 level on CT-we will defer work-up to primary      Discussion:      I had switched him to empiric oral ciprofloxacin and Flagyl on Friday. His fever curve is coming down. He is now afebrile for more than 24 hours. Serum creatinine is 1.2. White cell count is 8100. Blood cultures from admission remain negative    Abdominal x-ray done today shows improving gastric distention    The patient is more awake today. Plan:     Diagnostic Workup:      · Continue to follow  fever curve, WBC count and blood cultures. · Continue to monitor blood counts, liver and renal function. Antimicrobials:    · Will continue p.o. Cipro 500 mg every 12 hours  · Continue p.o. Flagyl 500 mg every 8 hours  · Recommend 5 more days of empiric Cipro and Flagyl until July 31  · We will follow up on the culture results and clinical progress and will make further recommendations accordingly. · Continue close vitals monitoring. · Maintain good glycemic control. · Fall precautions. Aspiration precautions. · Continue to watch for new fever or diarrhea. · DVT prophylaxis. · Discussed all above with patient and RN.       Drug Monitoring:    · Continue monitoring for antibiotic toxicity as follows: CBC, CMP, QTc interval  · Continue to watch for following: new or worsening fever, new hypotension, hives, lip swelling and redness or purulence at vascular access sites. I/v access Management:    · Continue to monitor i.v access sites for erythema, induration, discharge or tenderness. · As always, continue efforts to minimize tubes/lines/drains as clinically appropriate to reduce chances of line associated infections. Patient education and counseling:      · The patient was educated in detail about the side-effects of various antibiotics and things to watch for like new rashes, lip swelling, severe reaction, worsening diarrhea, break through fever etc.  · Discussed patient's condition and what to expect. All of the patient's questions were addressed in a satisfactory manner and patient verbalized understanding all instructions. Fluoroquinolone related instructions:     Patient instructed to watch for low or high blood sugars, muscle pains and ankle tendon pain while on Ciprofloxacin or Levofloxacin. Patient was advised to keep a sugar candy at all times as all fluoroquinolones have the potential of causing hypoglycemia. If these symptoms develops, patient was instructed to stop the antibiotic and call my office at [phone]. Use sunscreen when going in bright sun while on Ciprofloxacin or Levofloxacin as these antibiotics can cause photosensitivity. Take 1 hour before or 2 hour after dairy, calcium, iron, magnesium, aluminum or zinc.      TIME SPENT TODAY:     - Spent over  25 minutes on visit (including interval history, physical exam, review of data including labs, cultures, imaging, development and implementation of treatment plan and coordination of complex care). More than 50 percent of this includes face-to-face time spent with the patient for counseling and coordination of care.       Thanks for allowing me to participate in your patient's care. I will sign off today, but will be available to answer any further questions or concerns that may arise during patient's stay in the hospital.      Subjective: Interval history: Interval history was obtained from chart review and patient/ RN. He is on oral Cipro and Flagyl. He is now afebrile. Tolerating antibiotics okayThe patient is quite arousable and responsive today     REVIEW OF SYSTEMS:      Review of Systems   Constitutional: Negative for chills, diaphoresis and fever. HENT: Negative for ear discharge, ear pain, rhinorrhea, sore throat and trouble swallowing. Eyes: Negative for discharge and redness. Respiratory: Negative for cough, shortness of breath and wheezing. Cardiovascular: Negative for chest pain and leg swelling. Gastrointestinal: Negative for abdominal pain, constipation, diarrhea and nausea. Endocrine: Negative for polyuria. Genitourinary: Negative for dysuria, flank pain, frequency, hematuria and urgency. Musculoskeletal: Negative for back pain and myalgias. Skin: Negative for rash. Neurological: Negative for dizziness, seizures and headaches. Hematological: Does not bruise/bleed easily. Psychiatric/Behavioral: Negative for hallucinations and suicidal ideas. All other systems reviewed and are negative. Past Medical History: All past medical history reviewed today. Past Medical History:   Diagnosis Date    Arthritis     Brain aneurysm     Dementia ([facility] [address]     Enlarged prostate     Essential hypertension     Foot pain 03/30/2021    complaints of foot pain today    History of intracranial hemorrhage     Overactive bladder     Pituitary mass (HCC)     Right leg DVT (HCC)        Past Surgical History: All past surgical history was reviewed today. Past Surgical History:   Procedure Laterality Date    APPENDECTOMY  63    MOUTH SURGERY  2020    molars removed       Family History: All family history was reviewed today. Problem Relation Age of Onset    Diabetes Sister        Objective:       PHYSICAL EXAM:      Vitals:   Vitals:    07/26/21 0648 07/26/21 0939 07/26/21 1119 07/26/21 1225   BP:  127/82 118/74    Pulse: 95 95 98    Resp:  16 16    Temp:  98.3 °F (36.8 °C) 98.3 °F (36.8 °C)    TempSrc:  Oral Oral    SpO2:  97% 97%    Weight:       Height:    5' 4\" (1.626 m)       Physical Exam  Vitals and nursing note reviewed. Constitutional:       Appearance: Normal appearance. He is well-developed. HENT:      Head: Normocephalic and atraumatic. Right Ear: External ear normal.      Left Ear: External ear normal.      Nose: Nose normal. No congestion or rhinorrhea. Mouth/Throat:      Mouth: Mucous membranes are moist.      Pharynx: No oropharyngeal exudate or posterior oropharyngeal erythema. Eyes:      General: No scleral icterus. Right eye: No discharge. Left eye: No discharge. Conjunctiva/sclera: Conjunctivae normal.      Pupils: Pupils are equal, round, and reactive to light. Cardiovascular:      Rate and Rhythm: Normal rate and regular rhythm. Pulses: Normal pulses. Heart sounds: No murmur heard. No friction rub. Pulmonary:      Effort: Pulmonary effort is normal. No respiratory distress. Breath sounds: Normal breath sounds. No stridor. No wheezing, rhonchi or rales. Abdominal:      General: Bowel sounds are normal.      Palpations: Abdomen is soft. Tenderness: There is no abdominal tenderness. There is no right CVA tenderness, left CVA tenderness, guarding or rebound. Musculoskeletal:         General: No swelling or tenderness. Normal range of motion. Cervical back: Normal range of motion and neck supple. No rigidity. No muscular tenderness. Lymphadenopathy:      Cervical: No cervical adenopathy. Skin:     General: Skin is warm and dry. Coloration: Skin is not jaundiced. Findings: No erythema or rash.    Neurological:      General: No focal XR ABDOMEN (KUB) (SINGLE AP VIEW)   Final Result   Slowly resolving constipation with decreasing bowel dilatation which could   represent a resolving partial obstruction or ileus. NM MYOCARDIAL SPECT REST EXERCISE OR RX   Final Result      XR ABDOMEN (KUB) (SINGLE AP VIEW)   Final Result   Large amount of stool in the colon suggests constipation. Dilated air-filled   loops of small bowel and colon in association. Fecal impaction may be   considered clinically. CT CHEST WO CONTRAST   Final Result   De pendant opacity and bandlike opacities are seen at the lung bases, with   atelectasis favored over pneumonia. Trace pleural effusions are seen. Lytic and sclerotic appearing T9 vertebral body. Some certain measuring not   this represents a typical hemangioma common etiology such as Paget's disease,   or metastatic disease. Consider further characterization with bone scan. CT HEAD WO CONTRAST   Final Result   No acute intracranial abnormality. 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 Enlarging left frontal scalp hematoma. XR CHEST PORTABLE   Final Result   Interval development of bibasilar airspace disease, atelectasis favored over   pneumonia. XR CHEST PORTABLE   Final Result   No acute cardiopulmonary disease. No left-sided pneumothorax following pacer   placement. MRI BRAIN WO CONTRAST   Final Result   1. Left frontal scalp hematoma/edema. No evidence of acute intracranial   hemorrhage. 2. Again seen heterogeneous sellar/suprasellar with mass effect on the optic   chiasm and extension into the left sphenoid sinus. Evaluation is limited   secondary to lack of intravenous contrast (acute kidney injury). 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 3. Involution parenchymal changes with moderate microvascular ischemic   disease.    4. No evidence of acute infarct or midline shift. CT FACIAL BONES WO CONTRAST   Final Result   No acute traumatic injury of the facial bones. CT Cervical Spine WO Contrast   Final Result   No acute abnormality of the cervical spine. Multilevel degenerative disc disease with mild multilevel facet arthropathy. CT Head WO Contrast   Final Result   1. No acute intracranial abnormality. 2. Small left forehead contusion/scalp hematoma. 3. 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 4. Senescent changes. XR CHEST PORTABLE   Final Result   No radiographic evidence of acute cardiopulmonary disease. CT ABDOMEN PELVIS W IV CONTRAST Additional Contrast? Oral    (Results Pending)       Medications: All current and past medications were reviewed.      vancomycin  1,000 mg Intravenous Q24H    metoclopramide  5 mg Intravenous Q6H    metroNIDAZOLE  500 mg Intravenous Q8H    ciprofloxacin  400 mg Intravenous Q12H    sodium chloride flush  5-40 mL Intravenous 2 times per day    rosuvastatin  10 mg Oral Nightly    trospium  20 mg Oral BID        dextrose 5% in lactated ringers 80 mL/hr at 07/26/21 0530    dextrose      sodium chloride         glucose, dextrose, glucagon (rDNA), dextrose, sodium chloride flush, sodium chloride, ondansetron **OR** ondansetron, polyethylene glycol, acetaminophen **OR** acetaminophen      Problem list:       Patient Active Problem List   Diagnosis Code    Syncope and collapse R55    NAMRATA (acute kidney injury) ([facility] [address] N17.9    Hyponatremia E87.1    AV block, Mobitz 2 I44.1    S/P placement of cardiac pacemaker Z95.0    CKD (chronic kidney disease) stage 3, GFR 30-59 ml/min ([facility]) N18.30    HTN (hypertension), benign I10    High fever R50.9    Fecal impaction ([facility]) K56.41    Dementia ([facility] [address] F03.90    History of DVT (deep vein thrombosis) Z86.718    Atelectasis J98.11    Hematoma of scalp S00. 03XA    Anticoagulated Z79.01    Brain mass G93.89    Elevated serum creatinine R79.89    Lytic lesion of bone on x-ray M89.9    Encephalopathy, metabolic Y04.75    PAF (paroxysmal atrial fibrillation) (HCC) I48.0    Left arm cellulitis L03.114    Hypoglycemia E16.2       Please note that this chart was generated using Dragon dictation software. Although every effort was made to ensure the accuracy of this automated transcription, some errors in transcription may have occurred inadvertently. If you may need any clarification, please do not hesitate to contact me through EPIC or at the phone number provided below with my electronic signature. Any pictures or media included in this note were obtained after taking informed verbal consent from the patient and with their approval to include those in the patient's medical record.     Salo Craig MD, MPH  7/26/2021 , 3:10 PM   [facility] Infectious Disease   [address] [place] [address], [address]  Office: [phone]  Fax: [phone]  Clinic days:  Tuesday & Thursday

## 2021-07-26 NOTE — PROGRESS NOTES
Hospitalist Progress Note      PCP: Talia Hardy MD    Date of Admission: 7/17/2021    Chief Complaint: Abdominal distention    Hospital Course:  Patient feels weak  No acute events overnight  Had a bowel movement yesterday  Denies any abdominal pain  No nausea vomiting        Medications:  Reviewed      Exam:    /74   Pulse 98   Temp 98.3 °F (36.8 °C) (Oral)   Resp 16   Ht 5' 4\" (1.626 m)   Wt 149 lb 14.6 oz (68 kg)   SpO2 97%   BMI 25.73 kg/m²     General appearance: No apparent distress, appears stated age and cooperative. HEENT: Pupils equal, round, and reactive to light. Conjunctivae/corneas clear. Neck: Supple, with full range of motion. No jugular venous distention. Trachea midline. Respiratory:  Normal respiratory effort. Clear to auscultation, bilaterally without RALES/WHEEZES/Rhonchi. Cardiovascular: Regular rate and rhythm with normal S1/S2 without MURMURS, rubs or gallops. Abdomen: Abdo distended decreased bowel sounds mild generalized tenderness with no guarding no rigidity  Musculoskeletal: No clubbing, cyanosis or EDEMA bilaterally. Full range of motion without deformity. Skin: Skin color, texture, turgor normal.  No rashes or lesions. Neurologic:  Neurovascularly intact without any focal sensory/motor deficits. Cranial nerves: II-XII intact, grossly non-focal.        Labs:   Recent Labs     07/24/21  0453 07/25/21  0513 07/26/21  0541   WBC 9.1 8.0 8.1   HGB 14.3 12.6* 12.2*   HCT 41.3 36.5* 34.9*   * 122* 150     Recent Labs     07/24/21  0453 07/25/21  0513 07/26/21  0609    135* 137   K 3.6 3.4* 3.3*    100 102   CO2 21 26 26   BUN 9 10 8   CREATININE 1.0 1.3 1.2   CALCIUM 8.9 8.6 9.2     No results for input(s): AST, ALT, BILIDIR, BILITOT, ALKPHOS in the last 72 hours. No results for input(s): INR in the last 72 hours. No results for input(s): [place] in the last 72 hours.     Urinalysis:      Lab Results   Component Value Date Interval development of bibasilar airspace disease, atelectasis favored over   pneumonia. XR CHEST PORTABLE   Final Result   No acute cardiopulmonary disease. No left-sided pneumothorax following pacer   placement. MRI BRAIN WO CONTRAST   Final Result   1. Left frontal scalp hematoma/edema. No evidence of acute intracranial   hemorrhage. 2. Again seen heterogeneous sellar/suprasellar with mass effect on the optic   chiasm and extension into the left sphenoid sinus. Evaluation is limited   secondary to lack of intravenous contrast (acute kidney injury). 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 3. Involution parenchymal changes with moderate microvascular ischemic   disease. 4. No evidence of acute infarct or midline shift. CT FACIAL BONES WO CONTRAST   Final Result   No acute traumatic injury of the facial bones. CT Cervical Spine WO Contrast   Final Result   No acute abnormality of the cervical spine. Multilevel degenerative disc disease with mild multilevel facet arthropathy. CT Head WO Contrast   Final Result   1. No acute intracranial abnormality. 2. Small left forehead contusion/scalp hematoma. 3. 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 4. Senescent changes. XR CHEST PORTABLE   Final Result   No radiographic evidence of acute cardiopulmonary disease.          CT ABDOMEN PELVIS W IV CONTRAST Additional Contrast? Oral    (Results Pending)       Assessment/Plan:    Active Hospital Problems    Diagnosis Date Noted    Left arm cellulitis [Z99.367] 07/24/2021    Hypoglycemia [E16.2] 07/24/2021    Encephalopathy, metabolic [S31.64]     PAF (paroxysmal atrial fibrillation) (HCC) [I48.0]     Hematoma of scalp [S00.03XA]     Anticoagulated [Z79.01]     Brain mass [G93.89]     Elevated serum creatinine [R79.89]     Lytic lesion of bone on x-ray [M89.9]     CKD (chronic kidney disease) stage 3, GFR 30-59 ml/min ([facility]) [N18.30] 07/21/2021    HTN (hypertension), benign [I10] 07/21/2021    High fever [R50.9] 07/21/2021    Fecal impaction ([facility] [K56.41] 07/21/2021    Dementia ([facility] [F03.90] 07/21/2021    History of DVT (deep vein thrombosis) [Z86.718] 07/21/2021    Atelectasis [J98.11] 07/21/2021    S/P placement of cardiac pacemaker [Z95.0] 07/20/2021    Hyponatremia [E87.1]     AV block, Mobitz 2 [I44.1]     NAMRATA (acute kidney injury) ([facility] [N17.9] 07/17/2021    Syncope and collapse [R55] 03/30/2021       Acute Medical Issues Being Addressed:    75-year-old admitted with fall and syncope    Fall secondary to syncope partly suspected secondary to type II second-degree heart block s/p pacemaker insertion  No further acute events on telemetry  Seen by cardiology during this hospital admission had a pacemaker inserted on 7/21  Was on Eliquis which was held and that has been restarted  SPECT scan requested per cardio which is negative no evidence of ischemia    Acute kidney injury on baseline of chronic kidney disease stage III  Creatinine seems to at baseline of around 1.2    Fever secondary to cellulitis  Was on IV vancomycin has been changed over the bank and Cipro  No further fevers  Has been receiving antibiotics at least since the 22nd will complete a total 7-day course of antibiotics today's day 5    Abdominal distention secondary to constipation with associated ileus  Abdomen still distended mildly tender bowel function is ongoing  Started on Reglan  Seen by GI  Repeat CT abdomen and pelvis today  KUB noted    Sellar mass  Stable on CT head and subsequent MRI  Seen by neurosurgery  Follow-up with Dr. Mao Denise as outpatient    Recurrent orthostatic positive  Given IV fluids  On Ditropan and Flomax which have been discontinued    Acute encephalopathy metabolic  Had some confusion which is all resolved  No narcotics      DVT Prophylaxis:sc lovenlx   Diet: Diet NPO  Code Status: DNR-CCA      Dispo - once acute medical processes have resolved    Carlos Vogel MD

## 2021-07-26 NOTE — PLAN OF CARE
Nutrition Problem #1: Inadequate oral intake  Intervention: Food and/or Nutrient Delivery: Continue NPO  Nutritional Goals: Diet adv vs alternative nutrition in next 24 - 48 hr based on POC

## 2021-07-27 NOTE — PROGRESS NOTES
Hospitalist Progress Note      PCP: Lesia Lowe MD    Date of Admission: 7/17/2021    Chief Complaint: Abdominal distention    Hospital Course:  Patient feels weak  No acute events overnight  Had a very small bowel movement  No nausea vomiting  Denies any abdominal pain          Medications:  Reviewed      Exam:    /86   Pulse 109   Temp 98.2 °F (36.8 °C) (Oral)   Resp 16   Ht 5' 4\" (1.626 m)   Wt 149 lb 14.6 oz (68 kg)   SpO2 96%   BMI 25.73 kg/m²     General appearance: No apparent distress, appears stated age and cooperative. HEENT: Pupils equal, round, and reactive to light. Conjunctivae/corneas clear. Neck: Supple, with full range of motion. No jugular venous distention. Trachea midline. Respiratory:  Normal respiratory effort. Clear to auscultation, bilaterally without RALES/WHEEZES/Rhonchi. Cardiovascular: Regular rate and rhythm with normal S1/S2 without MURMURS, rubs or gallops. Abdomen: Abdo distended decreased bowel sounds mild generalized tenderness with no guarding no rigidity  Musculoskeletal: No clubbing, cyanosis or EDEMA bilaterally. Full range of motion without deformity. Skin: Skin color, texture, turgor normal.  No rashes or lesions. Neurologic:  Neurovascularly intact without any focal sensory/motor deficits. Cranial nerves: II-XII intact, grossly non-focal.  Physical exam remains unchanged from 7/26      Labs:   Recent Labs     07/25/21  0513 07/26/21  0541 07/27/21  0529   WBC 8.0 8.1 6.0   HGB 12.6* 12.2* 12.1*   HCT 36.5* 34.9* 34.9*   * 150 167     Recent Labs     07/25/21  0513 07/26/21  0609 07/27/21  0529   * 137 135*   K 3.4* 3.3* 3.5    102 101   CO2 26 26 24   BUN 10 8 7   CREATININE 1.3 1.2 1.2   CALCIUM 8.6 9.2 8.5     No results for input(s): AST, ALT, BILIDIR, BILITOT, ALKPHOS in the last 72 hours. No results for input(s): INR in the last 72 hours.   No results for input(s): [place] in the last 72 hours.    Urinalysis:      Lab Results   Component Value Date    NITRU Negative 07/21/2021    WBCUA 1 07/21/2021    RBCUA 3 07/21/2021    BLOODU Negative 07/21/2021    SPECGRAV 1.015 07/21/2021    GLUCOSEU Negative 07/21/2021       Radiology:  XR ABDOMEN (KUB) (SINGLE AP VIEW)   Final Result   1. Increase passage of contrast from stomach to small bowel with some   residual contrast seen in the stomach fundus. 2. Dilated loops of air-filled large bowel representing ileus or partial   obstruction. XR ABDOMEN (KUB) (SINGLE AP VIEW)   Final Result   This is progressed compared to prior study where it all this in the stomach. RECOMMENDATION:   Partial emptying of the stomach with some contrast seen within a few proximal   small bowel loops. Residual contrast is still seen within the gastric   fundus, incompletely included on these images. Moderate gaseous distension of the distal stomach. FL UGI   Final Result   Markedly distended stomach. Stomach emptying was not visualized during the   study. Stomach is markedly distended. Given the lack of stomach emptying,   delayed radiographs are recommended to assess for contrast emptying from the   stomach. No obstructing mass in the esophagus         CT ABDOMEN PELVIS W IV CONTRAST Additional Contrast? Oral   Final Result   Minimal gastric distention. No obstructive lesion is appreciated. Cholelithiasis. Trace pericardial effusion. Small bilateral pleural effusions with bibasilar   atelectasis. Mild hypertrophy of the prostate gland. XR ABDOMEN (2 VIEWS)   Final Result   Decreased gastric distension         XR ABDOMEN (KUB) (SINGLE AP VIEW)   Final Result   Stable appearance of the abdomen with gaseous distention of the stomach and   scattered nondistended air-filled loops of bowel throughout. XR ABDOMEN (KUB) (SINGLE AP VIEW)   Final Result   Moderate gastric distension.   Otherwise nonspecific bowel-gas pattern. Improved distention of the sigmoid colon         XR CHEST PORTABLE   Final Result   Improved aeration of the lung bases. Bandlike opacity remains on the right,   likely atelectasis given its bandlike morphology         XR ABDOMEN (KUB) (SINGLE AP VIEW)   Final Result   Slowly resolving constipation with decreasing bowel dilatation which could   represent a resolving partial obstruction or ileus. NM MYOCARDIAL SPECT REST EXERCISE OR RX   Final Result      XR ABDOMEN (KUB) (SINGLE AP VIEW)   Final Result   Large amount of stool in the colon suggests constipation. Dilated air-filled   loops of small bowel and colon in association. Fecal impaction may be   considered clinically. CT CHEST WO CONTRAST   Final Result   De pendant opacity and bandlike opacities are seen at the lung bases, with   atelectasis favored over pneumonia. Trace pleural effusions are seen. Lytic and sclerotic appearing T9 vertebral body. Some certain measuring not   this represents a typical hemangioma common etiology such as Paget's disease,   or metastatic disease. Consider further characterization with bone scan. CT HEAD WO CONTRAST   Final Result   No acute intracranial abnormality. 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 Enlarging left frontal scalp hematoma. XR CHEST PORTABLE   Final Result   Interval development of bibasilar airspace disease, atelectasis favored over   pneumonia. XR CHEST PORTABLE   Final Result   No acute cardiopulmonary disease. No left-sided pneumothorax following pacer   placement. MRI BRAIN WO CONTRAST   Final Result   1. Left frontal scalp hematoma/edema. No evidence of acute intracranial   hemorrhage. 2. Again seen heterogeneous sellar/suprasellar with mass effect on the optic   chiasm and extension into the left sphenoid sinus.   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  No further acute events on telemetry  Seen by cardiology during this hospital admission had a pacemaker inserted on 7/21  Was on Eliquis which was held and that has been restarted  SPECT scan requested per cardio which is negative no evidence of ischemia    Acute kidney injury on baseline of chronic kidney disease stage III  Creatinine seems to at baseline of around 1.2    Fever secondary to cellulitis  Was on IV vancomycin has been changed over the bank and Cipro  No further fevers  Has been receiving antibiotics at least since the 22nd  Per ID last day of antibiotics is the 31st then will stop    Abdominal distention secondary to constipation with associated ileus  Abdomen still distended mildly tender bowel function is ongoing  Started on Reglan  Seen by GI  Repeat CT abdomen and pelvis today did not show any ileus but did show some gastric distention  Upper GI series today showed some contrast  Seen by GI as well today  Repeat abdominal x-ray if no movement on the contrast will get an NG tube and then a EGD tomorrow  Patient remains n.p.o. now for a couple of days  If by tomorrow there are no plans to start oral intake then will start TPN tomorrow        Intracranial sellar mass  Stable on CT head and subsequent MRI  Seen by neurosurgery  Follow-up with Dr. Saleem White as outpatient    Recurrent orthostatic positive  Given IV fluids  On Ditropan and Flomax which have been discontinued  Orthostatics seem to have resolved    Acute encephalopathy metabolic  Had some confusion which is all resolved  No narcotics    Moderate protein calorie malnutrition  If no plans to stop oral feeds tomorrow we will start TPN    DVT Prophylaxis:sc lovenlx   Diet: Diet NPO  Code Status: DNR-CCA      Dispo - once acute medical processes have resolved    Kyleigh Arias MD

## 2021-07-27 NOTE — PROGRESS NOTES
Physician Progress Note      PATIENT:               Mauro Tovar  CSN #:                  [id number]  :                       1940  ADMIT DATE:       2021 3:59 PM  [address] [place] DATE:  RESPONDING  PROVIDER #:        Wenyd Stovall MD          QUERY TEXT:    Dear Dr. Miguel Sauceda,    [address] admitted with Heart block and had pacemaker insertion . Pt noted to have   Cellulitis, fever, tachycardia, low platelet count, AMS. If possible, please   document in the progress notes and discharge summary if you are evaluating and   /or treating any of the following: The medical record reflects the following:  Risk Factors: Cellulitis  Clinical Indicators: Patient noted with fever of 102.9 with tachycardia,   Platelet count of 049, AMS, with noted cellulitis of left arm. Treatment: IV Vancomycin, Imaging, ID consult, Lab work. Thank you   Jared Blackman RN CDS  Options provided:  -- Sepsis, not present on admission  -- Cellulitis without Sepsis  -- Sepsis was ruled out  -- Other - I will add my own diagnosis  -- Disagree - Not applicable / Not valid  -- Disagree - Clinically unable to determine / Unknown  -- Refer to Clinical Documentation Reviewer    PROVIDER RESPONSE TEXT:    This patient has sepsis that was not present on admission.     Query created by: Lucille Tubbs on 2021 11:09 AM      Electronically signed by:  Wendy Stovall MD 2021 6:12 PM

## 2021-07-27 NOTE — PROGRESS NOTES
Hospitalist Progress Note      PCP: Mandy Carlin MD    Date of Admission: 7/17/2021    Chief Complaint: Abdominal distention    Hospital Course:  Patient feels weak   No acute events overnight  Had a bowel movement yesterday  Denies any abdominal pain  No nausea/vomiting    Medications:  Reviewed    Exam:    /75   Pulse 93   Temp 98.1 °F (36.7 °C) (Axillary)   Resp 16   Ht 5' 4\" (1.626 m)   Wt 149 lb 14.6 oz (68 kg)   SpO2 98%   BMI 25.73 kg/m²     General appearance: No apparent distress, appears stated age and cooperative. HEENT: Pupils equal, round, and reactive to light. Conjunctivae/corneas clear. Neck: Supple, with full range of motion. No jugular venous distention. Trachea midline. Respiratory:  Normal respiratory effort. Clear to auscultation, bilaterally without RALES/WHEEZES/Rhonchi. Cardiovascular: Regular rate and rhythm with normal S1/S2 without MURMURS, rubs or gallops. Abdomen: Abdo distended decreased bowel sounds mild generalized tenderness with no guarding no rigidity  Musculoskeletal: No clubbing, cyanosis or EDEMA bilaterally. Full range of motion without deformity. Skin: Skin color, texture, turgor normal.  No rashes or lesions. Neurologic:  Neurovascularly intact without any focal sensory/motor deficits. Cranial nerves: II-XII intact, grossly non-focal.        Labs:   Recent Labs     07/25/21  0513 07/26/21  0541 07/27/21  0529   WBC 8.0 8.1 6.0   HGB 12.6* 12.2* 12.1*   HCT 36.5* 34.9* 34.9*   * 150 167     Recent Labs     07/25/21  0513 07/26/21  0609 07/27/21  0529   * 137 135*   K 3.4* 3.3* 3.5    102 101   CO2 26 26 24   BUN 10 8 7   CREATININE 1.3 1.2 1.2   CALCIUM 8.6 9.2 8.5     No results for input(s): AST, ALT, BILIDIR, BILITOT, ALKPHOS in the last 72 hours. No results for input(s): INR in the last 72 hours. No results for input(s): [place] in the last 72 hours.     Urinalysis:      Lab Results   Component Value Date NITRU Negative 07/21/2021    WBCUA 1 07/21/2021    RBCUA 3 07/21/2021    BLOODU Negative 07/21/2021    SPECGRAV 1.015 07/21/2021    GLUCOSEU Negative 07/21/2021       Radiology:  CT ABDOMEN PELVIS W IV CONTRAST Additional Contrast? Oral   Final Result   Minimal gastric distention. No obstructive lesion is appreciated. Cholelithiasis. Trace pericardial effusion. Small bilateral pleural effusions with bibasilar   atelectasis. Mild hypertrophy of the prostate gland. XR ABDOMEN (2 VIEWS)   Final Result   Decreased gastric distension         XR ABDOMEN (KUB) (SINGLE AP VIEW)   Final Result   Stable appearance of the abdomen with gaseous distention of the stomach and   scattered nondistended air-filled loops of bowel throughout. XR ABDOMEN (KUB) (SINGLE AP VIEW)   Final Result   Moderate gastric distension. Otherwise nonspecific bowel-gas pattern. Improved distention of the sigmoid colon         XR CHEST PORTABLE   Final Result   Improved aeration of the lung bases. Bandlike opacity remains on the right,   likely atelectasis given its bandlike morphology         XR ABDOMEN (KUB) (SINGLE AP VIEW)   Final Result   Slowly resolving constipation with decreasing bowel dilatation which could   represent a resolving partial obstruction or ileus. NM MYOCARDIAL SPECT REST EXERCISE OR RX   Final Result      XR ABDOMEN (KUB) (SINGLE AP VIEW)   Final Result   Large amount of stool in the colon suggests constipation. Dilated air-filled   loops of small bowel and colon in association. Fecal impaction may be   considered clinically. CT CHEST WO CONTRAST   Final Result   De pendant opacity and bandlike opacities are seen at the lung bases, with   atelectasis favored over pneumonia. Trace pleural effusions are seen. Lytic and sclerotic appearing T9 vertebral body.   Some certain measuring not   this represents a typical hemangioma common etiology such as Paget's disease,   or metastatic disease. Consider further characterization with bone scan. CT HEAD WO CONTRAST   Final Result   No acute intracranial abnormality. 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 Enlarging left frontal scalp hematoma. XR CHEST PORTABLE   Final Result   Interval development of bibasilar airspace disease, atelectasis favored over   pneumonia. XR CHEST PORTABLE   Final Result   No acute cardiopulmonary disease. No left-sided pneumothorax following pacer   placement. MRI BRAIN WO CONTRAST   Final Result   1. Left frontal scalp hematoma/edema. No evidence of acute intracranial   hemorrhage. 2. Again seen heterogeneous sellar/suprasellar with mass effect on the optic   chiasm and extension into the left sphenoid sinus. Evaluation is limited   secondary to lack of intravenous contrast (acute kidney injury). 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 3. Involution parenchymal changes with moderate microvascular ischemic   disease. 4. No evidence of acute infarct or midline shift. CT FACIAL BONES WO CONTRAST   Final Result   No acute traumatic injury of the facial bones. CT Cervical Spine WO Contrast   Final Result   No acute abnormality of the cervical spine. Multilevel degenerative disc disease with mild multilevel facet arthropathy. CT Head WO Contrast   Final Result   1. No acute intracranial abnormality. 2. Small left forehead contusion/scalp hematoma. 3. 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 4. Senescent changes. XR CHEST PORTABLE   Final Result   No radiographic evidence of acute cardiopulmonary disease.          FL UGI    (Results Pending)       Assessment/Plan:    Active Hospital Problems    Diagnosis Date Noted    Left arm cellulitis [L22.027] 07/24/2021    Hypoglycemia [E16.2] 07/24/2021    Encephalopathy, metabolic [A21.03]     PAF (paroxysmal atrial fibrillation) ([facility]) [I48.0]     Hematoma of scalp [S00.03XA]     Anticoagulated [Z79.01]     Brain mass [G93.89]     Elevated serum creatinine [R79.89]     Lytic lesion of bone on x-ray [M89.9]     CKD (chronic kidney disease) stage 3, GFR 30-59 ml/min ([facility]) [N18.30] 07/21/2021    Essential hypertension [I10] 07/21/2021    FUO (fever of unknown origin) [R50.9] 07/21/2021    Fecal impaction ([facility] [K56.41] 07/21/2021    Dementia ([facility] [facility] [F03.90] 07/21/2021    History of deep venous thrombosis (DVT) of distal vein of right lower extremity [Z86.718] 07/21/2021    Atelectasis [J98.11] 07/21/2021    S/P placement of cardiac pacemaker [Z95.0] 07/20/2021    Hyponatremia [E87.1]     AV block, Mobitz 2 [I44.1]     Acute kidney injury superimposed on CKD ([facility] [N17.9, N18.9] 07/17/2021    Syncope and collapse [R55] 03/30/2021       Acute Medical Issues Being Addressed:    78year-old admitted with fall and syncope    1. Syncope and fall   - Fall after syncopal event partly suspected secondary to type II second-degree heart block s/p      pacemaker insertion on 7/21   - No further acute events on telemetry   - SPECT scan requested per cardio which is negative no evidence of ischemia   - Eliquis which was held and has since been restarted    2. NAMRATA on baseline of CDK stage III   - Creatinine seems to at baseline of around 1.2   - Will continue to monitor    3. Fever   - Fever as a result of cellulitis of left arm 2/2 pacemaker insertion   - Continue IV vancomycin q24h - need to consult ID to determine date of discontinuation   - Flagyl and Cipro to be continued for 5 more days per ID    4.  Abdominal distension   - Secondary to constipation with associated ileus   - Soap suds enema given yesterday - no BM - repeat today   - KUB showed improving distension   - Repeat CT abdomen and pelvis showed minimal gastric distension, with other non-acute findings   - Continue Reglan PRN   - Per dietetics consult - consider parenteral nutrition if patient to remain NPO    5. Sellar mass   - Stable on CT head and subsequent MRI   - Seen by neurosurgery   - Follow-up with Dr. Yoana Negrete as outpatient    6. Orthostatic hypotension   - Recurrent orthostatic positive   - Continuous IV fluids   - Discontinued Ditropan and Flomax   - Nursing to repeat orthostatics    7.  Acute encephalopathy metabolic   - Confusion at time of admission; has since resolved   - No narcotics      DVT Prophylaxis:sc lovenlx   Diet: Diet NPO  Code Status: DNR-CCA      Dispo - once acute medical processes have resolved    Leah Pastor

## 2021-07-27 NOTE — CONSULTS
Gastroenterology Consult Note        Patient: Boston Madera  : 1940  Acct#:      Date:  2021    Subjective:       History of Present Illness  Patient is a 78 y.o.  male admitted with NAMRATA (acute kidney injury) ([facility] [address] [N17.9] who is seen in consult for gastric distention on upper GI. Patient with history of dementia unable to give history. History of DVT on Eliquis at home. Patient fell at home and was admitted on . Had NAMRATA. Had second-degree heart block and subsequently underwent pacemaker placement. Had abdominal distention so surgery saw patient. Was given laxatives and enemas for constipation and ileus. Had abdominal distention today so upper GI was done which showed markedly distended stomach and no passage of contrast.  To have repeat x-ray in an hour. Patient denies abdominal pain, nausea or vomiting. Per RN, patient has been n.p.o. for couple days      Past Medical History:   Diagnosis Date    Arthritis     Brain aneurysm     Dementia ([facility] [address]     Enlarged prostate     Essential hypertension     Foot pain 2021    complaints of foot pain today    History of intracranial hemorrhage     Overactive bladder     Pituitary mass ([facility] [address] [facility]     Right leg DVT ([facility] [address]       Past Surgical History:   Procedure Laterality Date    APPENDECTOMY      MOUTH SURGERY      molars removed      Past Endoscopic History: None in chart    Admission Meds  No current facility-administered medications on file prior to encounter.      Current Outpatient Medications on File Prior to Encounter   Medication Sig Dispense Refill    trospium (SANCTURA) 20 MG tablet Take 20 mg by mouth 2 times daily      vitamin D (CHOLECALCIFEROL) 25 MCG (1000 UT) TABS tablet Take 1,000 Units by mouth daily      oxybutynin (DITROPAN-XL) 5 MG extended release tablet Take 5 mg by mouth daily      tamsulosin (FLOMAX) 0.4 MG capsule Take 0.4 mg by mouth nightly      rosuvastatin (CRESTOR) 20 MG tablet Take 10 mg by mouth daily       apixaban (ELIQUIS) 5 MG TABS tablet Take 5 mg by mouth 2 times daily      fluticasone (FLONASE) 50 MCG/ACT nasal spray 1 spray by Nasal route daily           Allergies  Allergies   Allergen Reactions    Keflex [Cephalexin] Hives      Social   Social History     Tobacco Use    Smoking status: Never Smoker    Smokeless tobacco: Never Used   Substance Use Topics    Alcohol use: Never        Family History   Problem Relation Age of Onset    Diabetes Sister           Review of Systems  Review of systems not obtained due to patient factors. Physical Exam  Blood pressure 123/75, pulse 93, temperature 98.1 °F (36.7 °C), temperature source Axillary, resp. rate 16, height 5' 4\" (1.626 m), weight 149 lb 14.6 oz (68 kg), SpO2 98 %. General appearance: alert, cooperative, no distress, appears stated age  Eyes: Anicteric  Head: Normocephalic, without obvious abnormality  Lungs: clear to auscultation bilaterally, Normal Effort  Heart: regular rate and rhythm, normal S1 and S2, no murmurs or rubs  Abdomen: mild distention, non-tender. Bowel sounds normal. No masses,  no organomegaly. Extremities: atraumatic, no cyanosis or edema  Skin: warm and dry, no jaundice  Neuro: Grossly intact,alert but not oriented   Musculoskeletal: 5/5  strength BUE      Data Review:    Recent Labs     07/25/21  0513 07/26/21  0541 07/27/21  0529   WBC 8.0 8.1 6.0   HGB 12.6* 12.2* 12.1*   HCT 36.5* 34.9* 34.9*   MCV 92.3 92.5 92.5   * 150 167     Recent Labs     07/25/21  0513 07/26/21  0609 07/27/21  0529   * 137 135*   K 3.4* 3.3* 3.5    102 101   CO2 26 26 24   BUN 10 8 7   CREATININE 1.3 1.2 1.2     No results for input(s): AST, ALT, ALB, BILIDIR, BILITOT, ALKPHOS in the last 72 hours. No results for input(s): LIPASE, AMYLASE in the last 72 hours. No results for input(s): PROTIME, INR in the last 72 hours.   No results for input(s): PTT in the last 72 hours. No results for input(s): OCCULTBLD in the last 72 hours. Imaging Studies:                               CT-scan of abdomen and pelvis w iv and po contrast 7/26/21:  Impression   Minimal gastric distention.  No obstructive lesion is appreciated.       Cholelithiasis.       Trace pericardial effusion.  Small bilateral pleural effusions with bibasilar   atelectasis.       Mild hypertrophy of the prostate gland.         FL upper GI 7/27/21              Impression   Markedly distended stomach.  Stomach emptying was not visualized during the   study.  Stomach is markedly distended.  Given the lack of stomach emptying,   delayed radiographs are recommended to assess for contrast emptying from the   stomach.       No obstructing mass in the esophagus             Assessment:     Principal Problem:    AV block, Mobitz 2  Active Problems:    Syncope and collapse    Acute kidney injury superimposed on CKD ([facility])    Hyponatremia    S/P placement of cardiac pacemaker    CKD (chronic kidney disease) stage 3, GFR 30-59 ml/min ([facility])    Essential hypertension    FUO (fever of unknown origin)    Fecal impaction ([facility])    Dementia ([facility])    History of deep venous thrombosis (DVT) of distal vein of right lower extremity    Atelectasis    Hematoma of scalp    Anticoagulated    Brain mass    Elevated serum creatinine    Lytic lesion of bone on x-ray    Encephalopathy, metabolic    PAF (paroxysmal atrial fibrillation) ([facility])    Left arm cellulitis    Hypoglycemia  Resolved Problems:    * No resolved hospital problems. *    Gastric distention -upper GI with marked distention of the stomach with no passage of contrast.  To have repeat x-ray in an hour. No vomiting. Consider peptic ulcer disease or gastric outlet obstruction. Recommendations:   -PPI IV BID  -Follow-up AXR.  If contrast doesn't pass, would insert NG tube and hooked to low intermittent wall suction  -Plan EGD tomorrow    -Hold nightly Lovenox tonight for EGD tomorrow    Discussed with Dr. Mar Mirza. Dr Shweta Ordonez will see pt tomorrow.    MIK ParekhC  [employer]

## 2021-07-27 NOTE — PROGRESS NOTES
ALB, BILITOT, ALKPHOS in the last 72 hours. Amylase:  No results found for: AMYLASE  Lipase:  No results found for: LIPASE   Mag:    Lab Results   Component Value Date    MG 1.40 07/27/2021    MG 1.70 07/24/2021     Phos:     Lab Results   Component Value Date    PHOS 2.5 04/01/2021    PHOS 3.0 03/31/2021      Coags:   Lab Results   Component Value Date    PROTIME 19.4 04/01/2021    INR 1.66 04/01/2021    APTT 33.9 03/30/2021       Cultures:  Anaerobic culture  No results found for: LABANAE  Fungus stain  No results found for requested labs within last 30 days. Gram stain  No results found for requested labs within last 30 days. Organism  No results found for: [facility]  Surgical culture  No results found for: CXSURG  Blood culture 1  Results in Past 30 Days  Result Component Current Result Ref Range Previous Result Ref Range   Blood Culture, Routine No Growth after 4 days of incubation. (7/21/2021)  Not in Time Range      Blood culture 2  Results in Past 30 Days  Result Component Current Result Ref Range Previous Result Ref Range   Culture, Blood 2 No Growth after 4 days of incubation. (7/21/2021)  Not in Time Range      Fecal occult  No results found for requested labs within last 30 days. GI bacterial pathogens by PCR  No results found for requested labs within last 30 days. C. difficile  No results found for requested labs within last 30 days. Urine culture  No results found for: LABURIN    Pathology:  No relevant pathology     Imaging:  I have personally reviewed the following films:    CT ABDOMEN PELVIS W IV CONTRAST Additional Contrast? Oral    Result Date: 7/26/2021  EXAMINATION: CT OF THE ABDOMEN AND PELVIS WITH CONTRAST 7/26/2021 6:55 pm TECHNIQUE: CT of the abdomen and pelvis was performed with the administration of intravenous contrast. Multiplanar reformatted images are provided for review.  Dose modulation, iterative reconstruction, and/or weight based adjustment of the mA/kV was utilized 400 mg Intravenous Q12H    sodium chloride flush  5-40 mL Intravenous 2 times per day    rosuvastatin  10 mg Oral Nightly    trospium  20 mg Oral BID     Continuous Infusions:   dextrose 5% in lactated ringers 80 mL/hr at 07/27/21 0753    dextrose      sodium chloride 25 mL (07/27/21 0920)     PRN Meds:.glucose, dextrose, glucagon (rDNA), dextrose, sodium chloride flush, sodium chloride, ondansetron **OR** ondansetron, polyethylene glycol, acetaminophen **OR** acetaminophen      Assessment:  78 y.o. male admitted with   1. Syncope and collapse    2. Hematoma of scalp, initial encounter    3. Anticoagulated    4. Brain mass    5. Elevated serum creatinine        Generalized ileus, gastric distension  Constipation   Forehead hematoma  Syncope and collapse      Plan:  1. Decreased distension per CT yesterday with no other acute abdominal findings, exam relatively benign but still mildly distended; continue supportive care, check UGI study today--if normal will likely be able to start diet, if abnormal may need endoscopic evaluation per GI  2. NPO; monitor bowel function--last stool documented a couple of days ago  3. IV hydration; monitor and correct electrolytes  4. Activity as tolerated  5. PRN analgesics and antiemetics--minimizing narcotics as tolerated  6. Management of medical comorbid etiologies per primary team and consulting services    EDUCATION:  Educated patient on plan of care and disease process--all questions answered. Plans discussed with patient and nursing. Reviewed and discussed with Dr. Darlene Reyez. Signed:  GLENDY Cannon CNP  7/27/2021 12:46 PM    Surg Staff:     Pt seen and examined with NP  See full note above  Pt has had CT cone, nonacute, remains benign to exam, however tympanitic with gastric distension.  Will plan to proceed with UGI, question of delayed emptying, or could have pyloric stenosis or PUD  Will check UGI, ask GI to see if abnormal    Liborio Malhotra, MD

## 2021-07-28 NOTE — PROGRESS NOTES
Hospitalist Progress Note      PCP: Michael Meehan MD    Date of Admission: 7/17/2021    Chief Complaint: [facility] Course: 79 yo M with history of brain aneurysm, sellar mass, h/o DVT on Eliquis, dementia, CKD 3 who was brought to ER for syncope and fall. In ED, found to have NAMRATA on CKD and scalp hematoma. Admitted as inpatient for further management. Noted to be in Mobitz II, s/p PPM on 7/19/21. SPECT requested per Cardio to r/o CAD. Developed fevers on evening of 7/20 and has more swelling in L PPM pocket on 7/21/21; Eliquis held on 7/21/21. Noted to have atelectasis and fecal impaction. Molasses enema ordered on 7/21. Will go to [facility] upon DC. SPECT negative on 7/22. ID consulted for fevers. Neuro and NS consult consulted for waxing waning mental status and possible relation to intracranial mass; recommend outpatient f/u. ID recommended Cipro and Flagyl. Started on Vanco IV for L arm cellulitis on 7/24. Has been hypoglycemic, started on D5 IVF and POCT with hypoglycemia orders PRN. Kept NPO, started IV Reglan, ordered NGT and molasses enema. Surgery consulted. Discussed extensively with daughter on phone. Changed to DNR-CCA. EGD on 7/28/21 Unremarkable EGD. Pylorus was patent. No significant retained debris. Subjective:   Patient with no fevers. Awake. No CP, HA or abdominal pain.       Medications:  Reviewed    Infusion Medications    dextrose 5% in lactated ringers 80 mL/hr at 07/28/21 1433    dextrose      sodium chloride 25 mL (07/27/21 1434)     Scheduled Medications    enoxaparin  40 mg Subcutaneous Nightly    pantoprazole  40 mg Intravenous BID    vancomycin  1,000 mg Intravenous Q24H    metoclopramide  5 mg Intravenous Q6H    metroNIDAZOLE  500 mg Intravenous Q8H    ciprofloxacin  400 mg Intravenous Q12H    sodium chloride flush  5-40 mL Intravenous 2 times per day    rosuvastatin  10 mg Oral Nightly    trospium  20 mg Oral BID     PRN Meds: lidocaine PF, glucose, dextrose, glucagon (rDNA), dextrose, sodium chloride flush, sodium chloride, ondansetron **OR** ondansetron, polyethylene glycol, acetaminophen **OR** acetaminophen      Intake/Output Summary (Last 24 hours) at 7/28/2021 1746  Last data filed at 7/28/2021 1542  Gross per 24 hour   Intake 1150 ml   Output 400 ml   Net 750 ml       Physical Exam Performed:    /75   Pulse 106   Temp 97.3 °F (36.3 °C) (Axillary)   Resp 16   Ht 5' 4\" (1.626 m)   Wt 149 lb 14.6 oz (68 kg)   SpO2 97%   BMI 25.73 kg/m²     General appearance: No apparent distress, appears stated age and cooperative. HEENT: Pupils equal, round, and reactive to light. Conjunctivae/corneas clear. Neck: Supple, with full range of motion. No jugular venous distention. Trachea midline. Respiratory:  Normal respiratory effort. Clear to auscultation, bilaterally without Rales/Wheezes/Rhonchi. Cardiovascular: Regular rate and rhythm with normal S1/S2 without murmurs, rubs or gallops. L PPM site is swollen  Abdomen: Soft, non-tender, mildly distended with normal bowel sounds. Musculoskeletal: No clubbing, cyanosis or edema bilaterally. Full range of motion without deformity. Skin: Skin color, texture, turgor normal.  No rashes or lesions. Neurologic:  Neurovascularly intact without any focal sensory/motor deficits.  Cranial nerves: II-XII intact, grossly non-focal.  Psychiatric: Alert and oriented, thought content appropriate, normal insight  Capillary Refill: Brisk,3 seconds, normal   Peripheral Pulses: +2 palpable, equal bilaterally       Labs:   Recent Labs     07/26/21  0541 07/27/21  0529 07/28/21  0522   WBC 8.1 6.0 6.4   HGB 12.2* 12.1* 12.4*   HCT 34.9* 34.9* 36.1*    167 205     Recent Labs     07/26/21  0609 07/27/21  0529 07/28/21  0522    135* 134*   K 3.3* 3.5 3.5    101 100   CO2 26 24 27   BUN 8 7 6*   CREATININE 1.2 1.2 1.1   CALCIUM 9.2 8.5 9.0   PHOS  --   --  3.0     No results for input(s): AST, ALT, BILIDIR, BILITOT, ALKPHOS in the last 72 hours. No results for input(s): INR in the last 72 hours. No results for input(s): Colby Lager in the last 72 hours. Urinalysis:      Lab Results   Component Value Date    NITRU Negative 07/21/2021    WBCUA 1 07/21/2021    RBCUA 3 07/21/2021    BLOODU Negative 07/21/2021    SPECGRAV 1.015 07/21/2021    GLUCOSEU Negative 07/21/2021       Radiology:  XR ABDOMEN (KUB) (SINGLE AP VIEW)   Final Result   1. Increase passage of contrast from stomach to small bowel with some   residual contrast seen in the stomach fundus. 2. Dilated loops of air-filled large bowel representing ileus or partial   obstruction. XR ABDOMEN (KUB) (SINGLE AP VIEW)   Final Result   This is progressed compared to prior study where it all this in the stomach. RECOMMENDATION:   Partial emptying of the stomach with some contrast seen within a few proximal   small bowel loops. Residual contrast is still seen within the gastric   fundus, incompletely included on these images. Moderate gaseous distension of the distal stomach. FL UGI   Final Result   Markedly distended stomach. Stomach emptying was not visualized during the   study. Stomach is markedly distended. Given the lack of stomach emptying,   delayed radiographs are recommended to assess for contrast emptying from the   stomach. No obstructing mass in the esophagus         CT ABDOMEN PELVIS W IV CONTRAST Additional Contrast? Oral   Final Result   Minimal gastric distention. No obstructive lesion is appreciated. Cholelithiasis. Trace pericardial effusion. Small bilateral pleural effusions with bibasilar   atelectasis. Mild hypertrophy of the prostate gland.          XR ABDOMEN (2 VIEWS)   Final Result   Decreased gastric distension         XR ABDOMEN (KUB) (SINGLE AP VIEW)   Final Result   Stable appearance of the abdomen with gaseous distention of the stomach and scattered nondistended air-filled loops of bowel throughout. XR ABDOMEN (KUB) (SINGLE AP VIEW)   Final Result   Moderate gastric distension. Otherwise nonspecific bowel-gas pattern. Improved distention of the sigmoid colon         XR CHEST PORTABLE   Final Result   Improved aeration of the lung bases. Bandlike opacity remains on the right,   likely atelectasis given its bandlike morphology         XR ABDOMEN (KUB) (SINGLE AP VIEW)   Final Result   Slowly resolving constipation with decreasing bowel dilatation which could   represent a resolving partial obstruction or ileus. NM MYOCARDIAL SPECT REST EXERCISE OR RX   Final Result      XR ABDOMEN (KUB) (SINGLE AP VIEW)   Final Result   Large amount of stool in the colon suggests constipation. Dilated air-filled   loops of small bowel and colon in association. Fecal impaction may be   considered clinically. CT CHEST WO CONTRAST   Final Result   De pendant opacity and bandlike opacities are seen at the lung bases, with   atelectasis favored over pneumonia. Trace pleural effusions are seen. Lytic and sclerotic appearing T9 vertebral body. Some certain measuring not   this represents a typical hemangioma common etiology such as Paget's disease,   or metastatic disease. Consider further characterization with bone scan. CT HEAD WO CONTRAST   Final Result   No acute intracranial abnormality. 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 Enlarging left frontal scalp hematoma. XR CHEST PORTABLE   Final Result   Interval development of bibasilar airspace disease, atelectasis favored over   pneumonia. XR CHEST PORTABLE   Final Result   No acute cardiopulmonary disease. No left-sided pneumothorax following pacer   placement. MRI BRAIN WO CONTRAST   Final Result   1. Left frontal scalp hematoma/edema. No evidence of acute intracranial   hemorrhage.    2. Again seen heterogeneous sellar/suprasellar with mass effect on the optic   chiasm and extension into the left sphenoid sinus. Evaluation is limited   secondary to lack of intravenous contrast (acute kidney injury). 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 3. Involution parenchymal changes with moderate microvascular ischemic   disease. 4. No evidence of acute infarct or midline shift. CT FACIAL BONES WO CONTRAST   Final Result   No acute traumatic injury of the facial bones. CT Cervical Spine WO Contrast   Final Result   No acute abnormality of the cervical spine. Multilevel degenerative disc disease with mild multilevel facet arthropathy. CT Head WO Contrast   Final Result   1. No acute intracranial abnormality. 2. Small left forehead contusion/scalp hematoma. 3. 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 4. Senescent changes. XR CHEST PORTABLE   Final Result   No radiographic evidence of acute cardiopulmonary disease.                  Assessment/Plan:    Active Hospital Problems    Diagnosis     Paralytic ileus of large intestine ([address] [K56.0]     Left arm cellulitis [L78.267]     Hypoglycemia [E16.2]     Encephalopathy, metabolic [W24.24]     PAF (paroxysmal atrial fibrillation) ([facility]) [I48.0]     Hematoma of scalp [S00.03XA]     Anticoagulated [Z79.01]     Brain mass [G93.89]     Elevated serum creatinine [R79.89]     Lytic lesion of bone on x-ray [M89.9]     CKD (chronic kidney disease) stage 3, GFR 30-59 ml/min ([facility]) [N18.30]     Essential hypertension [I10]     FUO (fever of unknown origin) [R50.9]     Fecal impaction ([facility]) [K56.41]     Dementia ([facility]) [F03.90]     History of deep venous thrombosis (DVT) of distal vein of right lower extremity [Z86.718]     Atelectasis [J98.11]     S/P placement of cardiac pacemaker [Z95.0]     Hyponatremia [E87.1]     AV block, Mobitz 2 [I44.1]     Acute kidney injury superimposed on CKD ([address] [N17.9, N18.9]     Syncope and collapse [R55]      1. Cipro and Flagyl IV per ID  2. Remains off Eliquis   3. Continue Vanco IV for L arm cellulitis per PharmD  4. NO NARCOTICS OR NEUROSEDATING MEDS  5. POCT glucose q6h and PRN; hypoglycemia orders added  6. Stopped Ditropan and Flomax given recurrent orthostasis; monitor for urinary retention and place Douglas if PVR > 300 cc  7. Neurosurgery consult for sellar mass appreciated; follow up with Dr Cindi Mcpherson as OP  8. Diet per Surgery  9. SPECT negative  10. Did not tolerate NGT  11. Continue Reglan 5 mg IV q6h  12. Surgery consult for stomach distension appreciated  13. SCD  14. SNF upon DC  15. Remains on D5 in LR for hypoglycemia      DVT Prophylaxis: SCD  Diet: ADULT DIET; Full Liquid  Code Status: DNR-CCA    PT/OT Eval Status: Following    Dispo - [facility]    Discussed with patient, Dr Dottie Hernandez (GI), CM and nursing. SNF is impending here as soon as he tolerates diet. Very poor prognosis overall.     Betzy Watkins MD

## 2021-07-28 NOTE — OP NOTE
Operative Note      EGD    Unremarkable EGD. Pylorus was patent. No significant retained debris. Pt did develop significant hypotension and procedure terminated. NG not placed. Pt denied any N/V and abd was soft preop.      Will discuss with med team    Catarino Alford MD

## 2021-07-28 NOTE — PROGRESS NOTES
Patient awake. Patient states name and opens eyes when directed. Denies pain at present. Respirations even and easy, non-labored. Abdomen soft/flat. VSS. No distress noted. Continue to monitor. Dr. Leyla Granger at bedside. No new orders.

## 2021-07-28 NOTE — ANESTHESIA PRE PROCEDURE
injection 4 mg  4 mg Intravenous Once PRN Tisha Bryant MD        promethazine (PHENERGAN) injection 6.25 mg  6.25 mg Intravenous Once PRN Tisha Bryant MD        enoxaparin (LOVENOX) injection 40 mg  40 mg Subcutaneous Nightly Travon Sapp MD        pantoprazole (PROTONIX) injection 40 mg  40 mg Intravenous BID Augustin Mckenna PA-C   40 mg at 07/28/21 0754    vancomycin 1000 mg IVPB in 250 mL D5W addavial  1,000 mg Intravenous Q24H Clint Culver MD   Stopped at 07/28/21 0231    dextrose 5 % in lactated ringers infusion   Intravenous Continuous Dorothy Abrams MD 80 mL/hr at 07/27/21 0753 New Bag at 07/27/21 0753    glucose (GLUTOSE) 40 % oral gel 15 g  15 g Oral PRN Clint Culver MD        dextrose 50 % IV solution  12.5 g Intravenous PRN Clint Culver MD        glucagon (rDNA) injection 1 mg  1 mg Intramuscular PRN Clint Culver MD        dextrose 5 % solution  100 mL/hr Intravenous PRN Clint Culver MD        metoclopramide (REGLAN) injection 5 mg  5 mg Intravenous Q6H Clint Culver MD   5 mg at 07/28/21 0754    metronidazole (FLAGYL) 500 mg in NaCl 100 mL IVPB premix  500 mg Intravenous Q8H Clint Culver MD   Stopped at 07/28/21 5042    ciprofloxacin (CIPRO) IVPB 400 mg  400 mg Intravenous Q12H Clint Culver MD   Stopped at 07/28/21 0853    sodium chloride flush 0.9 % injection 5-40 mL  5-40 mL Intravenous 2 times per day Jayy De Luna MD   10 mL at 07/28/21 0754    sodium chloride flush 0.9 % injection 5-40 mL  5-40 mL Intravenous PRN Jayy De Luna MD        rosuvastatin (CRESTOR) tablet 10 mg  10 mg Oral Nightly Vernadine MD Michelle   10 mg at 07/25/21 2114    0.9 % sodium chloride infusion  25 mL Intravenous PRN Vernadine MD Michelle 100 mL/hr at 07/27/21 1434 25 mL at 07/27/21 1434    ondansetron (ZOFRAN-ODT) disintegrating tablet 4 mg  4 mg Oral Q8H PRN Vernadine MD Michelle        Or    ondansetron (ZOFRAN) injection 4 mg  4 mg Intravenous Q6H PRN Vernadine MD Michelle   4 mg at 07/18/21 7502  polyethylene glycol (GLYCOLAX) packet 17 g  17 g Oral Daily PRN Checo Smith MD        acetaminophen (TYLENOL) tablet 650 mg  650 mg Oral Q6H PRN Checo Smith MD   650 mg at 07/23/21 7562    Or    acetaminophen (TYLENOL) suppository 650 mg  650 mg Rectal Q6H PRN Checo Smith MD        [facility]) tablet 20 mg  20 mg Oral BID Checo Smith MD   20 mg at 07/26/21 0941       Allergies: Allergies   Allergen Reactions    Keflex [Cephalexin] Hives       Problem List:    Patient Active Problem List   Diagnosis Code    Syncope and collapse R55    Acute kidney injury superimposed on CKD ([facility] N17.9, N18.9    Hyponatremia E87.1    AV block, Mobitz 2 I44.1    S/P placement of cardiac pacemaker Z95.0    CKD (chronic kidney disease) stage 3, GFR 30-59 ml/min ([facility]) N18.30    Essential hypertension I10    FUO (fever of unknown origin) R50.9    Fecal impaction ([facility] K56.41    Dementia ([facility] F03.90    History of deep venous thrombosis (DVT) of distal vein of right lower extremity Z86.718    Atelectasis J98.11    Hematoma of scalp S00. 03XA    Anticoagulated Z79.01    Brain mass G93.89    Elevated serum creatinine R79.89    Lytic lesion of bone on x-ray M89.9    Encephalopathy, metabolic Q70.26    PAF (paroxysmal atrial fibrillation) ([facility]) I48.0    Left arm cellulitis L03.114    Hypoglycemia E16.2    Paralytic ileus of large intestine ([facility]) K56.0       Past Medical History:        Diagnosis Date    Arthritis     Brain aneurysm     Dementia ([address]     Enlarged prostate     Essential hypertension     Foot pain 03/30/2021    complaints of foot pain today    History of intracranial hemorrhage     Overactive bladder     Pituitary mass ([address]     Right leg DVT ([address]        Past Surgical History:        Procedure Laterality Date    APPENDECTOMY  1959    MOUTH SURGERY  2020    molars removed       Social History:    Social History     Tobacco Use    Smoking status: Never Smoker    Smokeless tobacco: Never Used   Substance Use Topics    Alcohol use: Never                                Counseling given: Not Answered      Vital Signs (Current):   Vitals:    07/28/21 0400 07/28/21 0521 07/28/21 0722 07/28/21 0723   BP:   129/84    Pulse: 88 90 99 99   Resp:   16    Temp:   97.9 °F (36.6 °C)    TempSrc:   Oral    SpO2:   96%    Weight:       Height:                                                  BP Readings from Last 3 Encounters:   07/28/21 129/84   04/01/21 130/74       NPO Status:                                                                                 BMI:   Wt Readings from Last 3 Encounters:   07/26/21 149 lb 14.6 oz (68 kg)   04/01/21 153 lb 14.4 oz (69.8 kg)     Body mass index is 25.73 kg/m².     CBC:   Lab Results   Component Value Date    WBC 6.4 07/28/2021    RBC 3.89 07/28/2021    HGB 12.4 07/28/2021    HCT 36.1 07/28/2021    MCV 92.8 07/28/2021    RDW 13.7 07/28/2021     07/28/2021       CMP:   Lab Results   Component Value Date     07/28/2021    K 3.5 07/28/2021    K 3.8 07/21/2021     07/28/2021    CO2 27 07/28/2021    BUN 6 07/28/2021    CREATININE 1.1 07/28/2021    GFRAA >60 07/28/2021    AGRATIO 0.8 07/21/2021    LABGLOM >60 07/28/2021    GLUCOSE 97 07/28/2021    PROT 6.8 07/21/2021    CALCIUM 9.0 07/28/2021    BILITOT 0.7 07/21/2021    ALKPHOS 74 07/21/2021    AST 36 07/21/2021    ALT 17 07/21/2021       POC Tests:   Recent Labs     07/28/21  0715   POCGLU 89       Coags:   Lab Results   Component Value Date    PROTIME 19.4 04/01/2021    INR 1.66 04/01/2021    APTT 33.9 03/30/2021       HCG (If Applicable): No results found for: PREGTESTUR, PREGSERUM, HCG, HCGQUANT     ABGs: No results found for: PHART, PO2ART, COM9EJK, VBI4KVF, BEART, Z4XZWANU     Type & Screen (If Applicable):  No results found for: LABABO, LABRH    Drug/Infectious Status (If Applicable):  No results found for: HIV, HEPCAB    COVID-19 Screening (If Applicable):   Lab Results daughter. Consent on paper chart.)  Induction: intravenous. Anesthetic plan and risks discussed with patient and child/children. Plan discussed with CRNA.               Keanu Diaz MD   7/28/2021

## 2021-07-28 NOTE — ANESTHESIA POSTPROCEDURE EVALUATION
Department of Anesthesiology  Postprocedure Note    Patient: Radha Blancas  MRN: [id number]  YOB: 1940  Date of evaluation: 7/28/2021  Time:  12:55 PM     Procedure Summary     Date: 07/28/21 Room / Location: [address] [place] [address]    Anesthesia Start: 1113 Anesthesia Stop: 7861    Procedure: EGD DIAGNOSTIC ONLY (N/A Abdomen) Diagnosis: (possible small bowel obstruction)    Surgeons: Mary Grace Medina MD Responsible Provider: Chastity Lee MD    Anesthesia Type: MAC ASA Status: 3          Anesthesia Type: MAC    Caridad Phase I: Caridad Score: 9    Caridad Phase II:      Last vitals: Reviewed and per EMR flowsheets.        Anesthesia Post Evaluation    Patient location during evaluation: PACU  Patient participation: complete - patient participated  Level of consciousness: responsive to verbal stimuli (sleepy)  Airway patency: patent  Nausea & Vomiting: no vomiting  Complications: no  Cardiovascular status: hemodynamically stable  Respiratory status: acceptable  Hydration status: euvolemic

## 2021-07-28 NOTE — PROGRESS NOTES
Dr. Lorraine Barkley at bedside. IV fluid bolus infusing. Medicated at bedside for low B/P per CRNA and Dr. Lorraine Barkley. 100 NRB on with 15 liter O2. Jaw thrust per CRNA as needed. Patient responsive to pain stimulation only. Continue to monitor.

## 2021-07-28 NOTE — PROGRESS NOTES
Reason for exam:->single AXR ok. eval for passage of contrast from stomach from upper GI. last AXR only showed inferior portion of stomach. Acuity: Unknown FINDINGS: Contrast is again noted large and small bowel. Dependent contrast seen within the stomach fundus. There is increased overall passage of contrast into small bowel in the left upper quadrant. Distended air-filled dilated loop of large bowel is noted. Air can be seen in distal large bowel. Limited evaluation for free air. Lung bases are clear. 1. Increase passage of contrast from stomach to small bowel with some residual contrast seen in the stomach fundus. 2. Dilated loops of air-filled large bowel representing ileus or partial obstruction. XR ABDOMEN (KUB) (SINGLE AP VIEW)    Result Date: 7/27/2021  EXAMINATION: ONE SUPINE XRAY VIEW(S) OF THE ABDOMEN 7/27/2021 3:02 pm COMPARISON: Upper GI earlier same date. CT abdomen and pelvis July 26, 2021. HISTORY: ORDERING SYSTEM PROVIDED HISTORY: CONTRAST FOLLOW UP AFTER UGI TECHNOLOGIST PROVIDED HISTORY: Reason for exam:->CONTRAST FOLLOW UP AFTER UGI Acuity: Acute Type of Exam: Subsequent/Follow-up FINDINGS: Some dense material is again seen throughout the colon from prior study, similar to prior CT. Upper abdomen is excluded. Some dense contrast is seen at the very top of the image which appears to represent some residual contrast in the gastric fundus. Moderate gaseous distension of the stomach. Contrast is seen within a few proximal small bowel loops but does not extend to the distal small bowel. This is progressed compared to prior study where it all this in the stomach. RECOMMENDATION: Partial emptying of the stomach with some contrast seen within a few proximal small bowel loops. Residual contrast is still seen within the gastric fundus, incompletely included on these images. Moderate gaseous distension of the distal stomach.      CT ABDOMEN PELVIS W IV CONTRAST Additional Contrast? Oral    Result Date: 7/26/2021  EXAMINATION: CT OF THE ABDOMEN AND PELVIS WITH CONTRAST 7/26/2021 6:55 pm TECHNIQUE: CT of the abdomen and pelvis was performed with the administration of intravenous contrast. Multiplanar reformatted images are provided for review. Dose modulation, iterative reconstruction, and/or weight based adjustment of the mA/kV was utilized to reduce the radiation dose to as low as reasonably achievable. COMPARISON: CT chest, 07/21/2021 HISTORY: ORDERING SYSTEM PROVIDED HISTORY: Eval gastric distention TECHNOLOGIST PROVIDED HISTORY: Reason for exam:->Eval gastric distention Additional Contrast?->Oral Reason for Exam: Eval gastric distention Acuity: Acute Type of Exam: Initial FINDINGS: Lower Chest: Cardiac pacer wires are present. There is a trace pericardial effusion. Small bilateral pleural effusions are present. There is mild dependent atelectasis. Organs: The liver is homogeneous and normal in attenuation. There is an intermediate density in the dependent gallbladder, likely stone. The pancreas, spleen and adrenal glands are unremarkable. The kidneys enhance symmetrically. There is no hydronephrosis. There is a simple 3.3 cm cyst of the superior left kidney. GI/Bowel: There is minimal gastric distention. No dilated loops of small bowel are identified. No focal mural thickening of the colon is identified. There is subtle perirectal edema. Pelvis: Urinary bladder is unremarkable. There is mild hypertrophy of the median lobe of the prostate gland. Peritoneum/Retroperitoneum: There is moderate aortoiliac calcification, without aneurysm. No adenopathy. Bones/Soft Tissues: At L4-5 there is grade 1 spondylolisthesis. Lucencies with vertical sclerotic striations are noted at T9 and L3, suggestive of hemangiomas. They appear stable in the interval.  Abdominal wall is unremarkable. Minimal gastric distention. No obstructive lesion is appreciated. Cholelithiasis.  Trace pericardial effusion. Small bilateral pleural effusions with bibasilar atelectasis. Mild hypertrophy of the prostate gland. FL UGI    Result Date: 7/27/2021  EXAMINATION: SINGLE CONTRAST UPPER GI SERIES 7/27/2021 HISTORY: ORDERING SYSTEM PROVIDED HISTORY: Gastric distention TECHNOLOGIST PROVIDED HISTORY: Reason for exam:->Gastric distention Reason for Exam: Gastric distention Acuity: Unknown Type of Exam: Unknown COMPARISON: None. TECHNIQUE: Multiple single contrast images of the esophagus, gastroesophageal junction and stomach were obtained following the oral administration of thin barium FLUOROSCOPY DOSE AND TYPE OR TIME AND EXPOSURES: 23 spot images 1 minute 42 seconds fluoroscopy FINDINGS: Patient could not tolerate routine standard positioning. Patient was evaluated in the semi upright position only Patient swallowed barium without difficulty. No obstructing mass seen within the esophagus. Barium flows freely into the stomach. Similar to recent CT scan, stomach is markedly distended The patient could not tolerate routine standard positioning, therefore, evaluation for stomach emptying in the fluoroscopy suite in a timely manner was not possible Degenerative changes are seen in the spine. Markedly distended stomach. Stomach emptying was not visualized during the study. Stomach is markedly distended. Given the lack of stomach emptying, delayed radiographs are recommended to assess for contrast emptying from the stomach.  No obstructing mass in the esophagus     Scheduled Meds:   enoxaparin  40 mg Subcutaneous Nightly    pantoprazole  40 mg Intravenous BID    vancomycin  1,000 mg Intravenous Q24H    metoclopramide  5 mg Intravenous Q6H    metroNIDAZOLE  500 mg Intravenous Q8H    ciprofloxacin  400 mg Intravenous Q12H    sodium chloride flush  5-40 mL Intravenous 2 times per day    rosuvastatin  10 mg Oral Nightly    trospium  20 mg Oral BID     Continuous Infusions:   dextrose 5% in lactated ringers 80 mL/hr at 07/27/21 0753    dextrose      sodium chloride 25 mL (07/27/21 1434)     PRN Meds:. HYDROmorphone, HYDROmorphone, fentanNYL, fentanNYL, HYDROcodone 5 mg - acetaminophen, ondansetron, promethazine, glucose, dextrose, glucagon (rDNA), dextrose, sodium chloride flush, sodium chloride, ondansetron **OR** ondansetron, polyethylene glycol, acetaminophen **OR** acetaminophen      Assessment:  78 y.o. male admitted with   1. Syncope and collapse    2. Hematoma of scalp, initial encounter    3. Anticoagulated    4. Brain mass    5. Elevated serum creatinine        Generalized ileus, gastric distension  Constipation   Forehead hematoma  Syncope and collapse      Plan: 1. Exam remains relatively benign but still mildly distended; UGI study yesterday with delayed gastric emptying, GI consulted and planning for EGD today  2. NPO; monitor bowel function--passing stool  3. IV hydration; monitor and correct electrolytes  4. Activity as tolerated  5. PRN analgesics and antiemetics--minimizing narcotics as tolerated  6. Management of medical comorbid etiologies per primary team and consulting services    EDUCATION:  Educated patient on plan of care and disease process--all questions answered. Plans discussed with patient and nursing. Reviewed and discussed with Dr. Annie Roqeu.       Signed:  GLENDY Beck CNP  7/28/2021 11:45 AM     Surg Staff:     Pt seen and examined  See full note above  Abd id mildly distended, EGD appeared neg for mechanical obstruction or severe PUD  Will try liquids and see if we can progress with diet    Merna Conley MD

## 2021-07-28 NOTE — PROGRESS NOTES
Patient arousing. Alert to name. No signs of pain present. Respirations even and easy, non-labored. Abdomen soft/flat/non-tender. O2 per nasal canula. All fluid bolus done. Continue to monitor.

## 2021-07-28 NOTE — PROGRESS NOTES
Arrived to [address] [place] [address] 8. Patient non-responsive. 100 % NRB on with O2  15 liters. CRNA at bedside. Jaw thrust performed per CRNA. Low B/P. Medicated at bedside per CRNA. Continue to monitor.

## 2021-07-28 NOTE — PROGRESS NOTES
Dr. Lorraine Barkley at bedside. No new orders. Continue to monitor patient. Hold patient in pacu until Dr. Lorraine Barkley rechecks patient.

## 2021-07-29 NOTE — PLAN OF CARE
Nutrition Problem #1: Inadequate oral intake  Intervention: Food and/or Nutrient Delivery: Continue Current Diet, Start Oral Nutrition Supplement  Nutritional Goals: po intake at least 50% of meals & supplements (new goal set 7/29)

## 2021-07-29 NOTE — PROGRESS NOTES
Gastroenterology Progress Note    Tom Jones is a 78 y.o. male patient. 1. Syncope and collapse    2. Hematoma of scalp, initial encounter    3. Anticoagulated    4. Brain mass    5. Elevated serum creatinine        SUBJECTIVE:  No N/V or abdominal pain. ROS:  No fever, chills  No chest pain, palpitations  No SOB, cough      Physical    VITALS:  /69   Pulse 97   Temp 97.6 °F (36.4 °C) (Oral)   Resp 16   Ht 5' 4\" (1.626 m)   Wt 156 lb 1.4 oz (70.8 kg)   SpO2 97%   BMI 26.79 kg/m²   TEMPERATURE:  Current - Temp: 97.6 °F (36.4 °C); Max - Temp  Av.3 °F (36.3 °C)  Min: 97 °F (36.1 °C)  Max: 97.6 °F (36.4 °C)    NAD  RRR, Nl s1s2  Lungs CTA Bilaterally, normal effort  Abdomen soft, ND, NT, no HSM, Bowel sounds normal  alert    Data    Data Review:    Recent Labs     21  0529 21  0522   WBC 6.0 6.4   HGB 12.1* 12.4*   HCT 34.9* 36.1*   MCV 92.5 92.8    205     Recent Labs     21  0529 21  0522 21  0639   * 134* 139   K 3.5 3.5 3.9    100 105   CO2 24 27 27   PHOS  --  3.0  --    BUN 7 6* 6*   CREATININE 1.2 1.1 1.2     No results for input(s): AST, ALT, ALB, BILIDIR, BILITOT, ALKPHOS in the last 72 hours. No results for input(s): LIPASE, AMYLASE in the last 72 hours. No results for input(s): PROTIME, INR in the last 72 hours. No results for input(s): PTT in the last 72 hours. ASSESSMENT     Gastric distention -upper GI with marked distention of the stomach with no passage of contrast initially but did pass on later AXR. EGD  unremarkable, patent pylorus. No N/V or pain. Tolerated fulls. PLAN    - advance to solid food    Discussed with Dr. Melburn Flavin, PA-C [facility]    I have personally performed a face to face diagnostic evaluation on this patient. I have interviewed and examined the patient and I agree with the findings and recommended plan of care.   In summary, my findings and plan are the following: pt wo complaints. Per nursing, pt did well with fulls this am. Will try to advance diet.        Jessica Sun MD  [address]

## 2021-07-29 NOTE — PLAN OF CARE
symptoms  Outcome: Ongoing     Problem: Skin Integrity:  Goal: Absence of new skin breakdown  Description: Absence of new skin breakdown  7/29/2021 1000 by Gil Calle RN  Outcome: Ongoing     Problem: Musculor/Skeletal Functional Status  Goal: Highest potential functional level  Outcome: Ongoing     Problem: Musculor/Skeletal Functional Status  Goal: Absence of falls  Outcome: Ongoing     Problem: Nutrition  Goal: Optimal nutrition therapy  Outcome: Ongoing     Problem: Bowel/Gastric:  Goal: Ability to achieve a regular elimination pattern will improve  Description: Ability to achieve a regular elimination pattern will improve  Outcome: Ongoing     Problem: Bowel/Gastric:  Goal: Control of bowel function will improve  Description: Control of bowel function will improve  Outcome: Ongoing     Problem:  Bowel/Gastric:  Goal: Will not experience complications related to bowel motility  Description: Will not experience complications related to bowel motility  Outcome: Ongoing     Problem: Nutritional:  Goal: Maintenance of adequate nutrition will improve  Description: Maintenance of adequate nutrition will improve  Outcome: Ongoing

## 2021-07-29 NOTE — ACP (ADVANCE CARE PLANNING)
Advanced Care Planning Note. Purpose of Encounter: Advanced care planning in light of acute metabolic encephalopathy  Parties In Attendance: Patient, daughter  Decisional Capacity: Limited  Subjective: Patient denies CP, SOB  Objective: Cr 1.2  Goals of Care Determination: Patient wants full support. Daughter (POA) wants limited support (NO CPR, no vent, no HD, no trach, no PEG, no surgery)  Plan:  IV Abx. Eliquis. SNF placement. Surgery consult  Code Status: DNR CCA   Time spent on Advanced care Plannin minutes  Advanced Care Planning Documents: Completed advanced directives on chart, children (son and daughter) share the POA.     Hira Rivero MD  2021 2:57 PM

## 2021-07-29 NOTE — PROGRESS NOTES
Physical Therapy  Facility/Department: [address] NURSING  Daily Treatment Note  NAME: Leo Engel  : 1940  MRN: [id number]    Date of Service: 2021    Discharge Recommendations:Paco LUTZ scored a 15/24 on the AM-PAC short mobility form. Current research shows that an AM-PAC score of 17 or less is typically not associated with a discharge to the patient's home setting. Based on the patient's AM-PAC score and their current functional mobility deficits, it is recommended that the patient have 3-5 sessions per week of Physical Therapy at d/c to increase the patient's independence. Please see assessment section for further patient specific details. If patient discharges prior to next session this note will serve as a discharge summary. Please see below for the latest assessment towards goals. 3-5 sessions per week   PT Equipment Recommendations  Equipment Needed: No    Assessment   Body structures, Functions, Activity limitations: Decreased functional mobility ; Decreased balance;Decreased cognition;Decreased endurance;Decreased strength;Decreased ADL status  Assessment: Pt remaining lethargic throughout treatment, but alert and responsive during session. Pt coming to stand with min A, though requiring min A x2 for bed mobility. Pt's BP remaining fairly consistent this date, though mild drop noted when coming to stand with elevated HR. Pt would benefit from skilled PT services to promote safe return to PLOF. Treatment Diagnosis: decreased functional mobility associated with NAMRATA and insertion of pacemaker  Prognosis: Fair  Clinical Presentation: evolving  PT Education: PT Role;Transfer Training;General Safety;Orientation;Goals;Plan of Care;Precautions; Energy Conservation;Gait Training;Functional Mobility Training  Patient Education: pt verbalized understanding, will require reinforcement  Barriers to Learning: cognitive  REQUIRES PT FOLLOW UP: Yes  Activity Tolerance  Activity Tolerance: Patient limited by fatigue;Patient limited by endurance  Activity Tolerance: Pt difficult to maintain eye contact, was lethargic intermittently throughout treatment, closing eyes at times. /71 supine in bed, 121/75 in high resendiz's position in bed. 124/78 and 122/79 after sitting EOB and following ADL at EOB, respectively. 104/65 and 110/67 while standing and after sitting at EOB. Patient Diagnosis(es): The primary encounter diagnosis was Syncope and collapse. Diagnoses of Hematoma of scalp, initial encounter, Anticoagulated, Brain mass, and Elevated serum creatinine were also pertinent to this visit. 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 has a past surgical history that includes Appendectomy (3444); Mouth surgery (2020); and Upper gastrointestinal endoscopy (N/A, 7/28/2021). Restrictions  Restrictions/Precautions  Restrictions/Precautions: Cardiac, Fall Risk (Pacemaker, High fall risk)  Required Braces or Orthoses?: Yes (no lifting, no overhead/shoulder flexion overhead x 30 days)  Implants present? : Pacemaker  Position Activity Restriction  Other position/activity restrictions: Karlos Fraga is a 78 y.o. male who presents to the emergency department after a syncopal episode with head injury. Patient has history of dementia. Presents here with his daughter who he lives with and is his caregiver. Daughter states that he was coming out of his room using his cane when all of a sudden he became wobbly and then fell face forward and had a syncopal episode. He is anticoagulated on Eliquis with history of DVT in his right leg. Has history of a brain aneurysm and pituitary mass. Patient denies any pain at this time. Daughter states he has been eating less over the past few days.   Has some abrasions over his face. Denies visual changes, headache, chest pain, shortness breath, abdominal pain, extremity pain, numbness or difficulty moving his extremities. He is alert to person and place but not time. Daughter states this is normal for him with his history of dementia. Subjective   General  Chart Reviewed: Yes  Response To Previous Treatment: Patient with no complaints from previous session. Family / Caregiver Present: No  Subjective  Subjective: Pt sleeping in bed upon PT/OT arrival, lethargic initially, but more alert during middle of session  General Comment  Comments: Pt denies pain  Pain Screening  Patient Currently in Pain: Denies  Vital Signs  Patient Currently in Pain: Denies       Orientation  Orientation  Overall Orientation Status: Impaired  Orientation Level: Disoriented to place;Oriented to person;Disoriented to situation;Disoriented to time  Cognition      Objective   Bed mobility  Supine to Sit: 2 Person assistance;Dependent/Total (Min A x2)  Sit to Supine: 2 Person assistance;Dependent/Total (Min A x2)  Scooting: Moderate assistance  Transfers  Sit to Stand: Minimal Assistance  Stand to sit: Minimal Assistance  Ambulation  Ambulation?: Yes  Ambulation 1  Surface: level tile  Device: Rolling Walker  Assistance: Contact guard assistance  Quality of Gait: wide Irais, decreased klever, B genu varum noted, shuffling gait pattern, poor foot clearance, no LOB. Distance: ~3 ft forward/backward  Stairs/Curb  Stairs?: No     Balance  Posture: Fair  Sitting - Static: Good;-  Sitting - Dynamic: Fair;+  Standing - Static: Fair  Standing - Dynamic: Fair;-            Comment: Pt sitting at EOB completing bathing and oral care with OT.  CGA for static sitting balance              G-Code     OutComes Score                                                     AM-PAC Score  AM-PAC Inpatient Mobility Raw Score : 15 (07/29/21 1352)  AM-PAC Inpatient T-Scale Score : 39.45 (07/29/21 1352)  Mobility Inpatient CMS 0-100% Score: 57.7 (07/29/21 1352)  Mobility Inpatient CMS G-Code Modifier : CK (07/29/21 1352)          Goals  Short term goals  Time Frame for Short term goals: prior to discharge  Short term goal 1: pt will be able to ambulate with handheld/cane 20ft with CG  Short term goal 2: pt will be able to ambulate flight of stairs with cane and Gavin  Short term goal 3: pt will be able to sit to/from stand independently  Short term goal 4: pt will be able to perform bed mobility independently  Patient Goals   Patient goals : return home   **no goals met this date    Plan    Plan  Times per week: 3-5  Times per day: Daily  Current Treatment Recommendations: Transfer Training, Endurance Training, Strengthening, ROM, Balance Training, Gait Training, Functional Mobility Training, Cognitive Reorientation, Patient/Caregiver Education & Training, Neuromuscular Re-education, Safety Education & Training, Home Exercise Program  Safety Devices  Type of devices:  All fall risk precautions in place, Bed alarm in place, Call light within reach, Gait belt, Patient at risk for falls, Left in bed, Nurse notified, Ramya Foot in use  Restraints  Initially in place: No     Therapy Time   Individual Concurrent Group Co-treatment   Time In       4793   Time Out       1300   Minutes       45   Timed Code Treatment Minutes: [address] Stas Stewart, PT   Roby Alegre, PT, DPT, [id number]

## 2021-07-29 NOTE — CARE COORDINATION
CM informed Luis Prudence  at [address] admissions of the  updated therapy notes being on the chart. She stated she is initiating the precert. Will call with updates.

## 2021-07-29 NOTE — PROGRESS NOTES
[facility]        Progress Note    Patient Name: Siomara Juares  MRN: [id number]  YOB: 1940  Date of Evaluation: 2021    Reason for Consult: Abdominal distention    Subjective:  No acute events overnight  Denies abdominal pain; not requiring analgesics  No nausea or vomiting, tolerating full liquids  Passing stool, denies feeling bloated  Resting in bed at this time      Vital Signs:  Patient Vitals for the past 24 hrs:   BP Temp Temp src Pulse Resp SpO2 Weight   21 0845 124/79 97.5 °F (36.4 °C) Oral 99 16 97 % --   21 0515 -- -- -- -- -- -- 156 lb 1.4 oz (70.8 kg)   21 0434 110/69 97.5 °F (36.4 °C) Axillary 89 16 95 % --   21 0042 110/68 97.3 °F (36.3 °C) Axillary 91 16 98 % --   21 2035 111/68 97.3 °F (36.3 °C) Axillary 96 16 96 % --   21 1601 118/75 97.3 °F (36.3 °C) Axillary 106 16 97 % --   21 1315 112/73 97.6 °F (36.4 °C) Oral 96 14 96 % --   21 1251 (!) 109/53 97.3 °F (36.3 °C) Temporal 92 14 96 % --   21 1245 102/66 -- -- 95 13 98 % --   21 1240 105/68 -- -- 96 12 98 % --   21 1235 100/67 -- -- 98 13 97 % --   21 1230 96/65 97 °F (36.1 °C) Temporal 99 14 97 % --   21 1225 95/66 97 °F (36.1 °C) Temporal 98 14 95 % --   21 1220 98/63 96.7 °F (35.9 °C) Temporal 95 13 97 % --   21 1215 106/69 -- -- 97 15 99 % --   21 1210 97/68 -- -- 96 16 99 % --   21 1205 99/70 -- -- 97 12 99 % --   21 1200 97/65 96.7 °F (35.9 °C) Temporal 97 12 99 % --   21 1155 97/66 -- -- 97 10 100 % --   21 1150 (!) 82/50 96.7 °F (35.9 °C) Temporal 99 12 100 % --   21 1145 (!) 84/40 -- -- 101 12 100 % --   21 1140 (!) 82/57 -- -- 107 12 99 % --   21 1135 (!) 80/52 -- -- 103 12 92 % --   21 1132 98/72 96.6 °F (35.9 °C) Temporal 103 12 96 % --      TEMPERATURE HISTORY 24H: Temp (24hrs), Av.1 °F (36.2 °C), Min:96.6 °F (35.9 °C), Max:97.6 °F (36.4 °C)    BLOOD PRESSURE HISTORY: Systolic (10YOH), YAMILA:27 , Min:53 , LEK:433    Diastolic (93HBK), SCC:31, Min:37, Max:84      Intake/Output:  I/O last 3 completed shifts: In: 8020 [P.O.:360; I.V.:1150]  Out: 400 [Urine:400]  No intake/output data recorded. Drain/tube Output:       Physical Exam:  General: awake, alert, loosely oriented  Cardiovascular:  regular rate and rhythm and no murmur noted  Lungs: clear to auscultation  Abdomen: soft, nontender, minimally distended, bowel sounds active     Labs:  CBC:    Recent Labs     07/27/21  0529 07/28/21 0522   WBC 6.0 6.4   HGB 12.1* 12.4*   HCT 34.9* 36.1*    205     BMP:    Recent Labs     07/27/21  0529 07/28/21  0522 07/29/21  0639   * 134* 139   K 3.5 3.5 3.9    100 105   CO2 24 27 27   BUN 7 6* 6*   CREATININE 1.2 1.1 1.2   GLUCOSE 100* 97 115*     Hepatic:  No results for input(s): AST, ALT, ALB, BILITOT, ALKPHOS in the last 72 hours. Amylase:  No results found for: AMYLASE  Lipase:  No results found for: LIPASE   Mag:    Lab Results   Component Value Date    MG 2.00 07/28/2021    MG 2.00 07/28/2021     Phos:     Lab Results   Component Value Date    PHOS 3.0 07/28/2021    PHOS 2.5 04/01/2021      Coags:   Lab Results   Component Value Date    PROTIME 19.4 04/01/2021    INR 1.66 04/01/2021    APTT 33.9 03/30/2021       Cultures:  Anaerobic culture  No results found for: LABANAE  Fungus stain  No results found for requested labs within last 30 days. Gram stain  No results found for requested labs within last 30 days.      Organism  No results found for: [facility]  Surgical culture  No results found for: CXSURG  Blood culture 1  Results in Past 30 Days  Result Component Current Result Ref Range Previous Result Ref Range   Blood Culture, Routine No Growth after 4 days of incubation. (7/21/2021)  Not in Time Range      Blood culture 2  Results in Past 30 Days  Result Component Current Result Ref Range Previous Result Ref Range Culture, Blood 2 No Growth after 4 days of incubation. (7/21/2021)  Not in Time Range      Fecal occult  No results found for requested labs within last 30 days. GI bacterial pathogens by PCR  No results found for requested labs within last 30 days. C. difficile  No results found for requested labs within last 30 days. Urine culture  No results found for: LABURIN    Pathology:  No relevant pathology     Imaging:  I have personally reviewed the following films:    XR ABDOMEN (KUB) (SINGLE AP VIEW)    Result Date: 7/27/2021  EXAMINATION: ONE SUPINE XRAY VIEW(S) OF THE ABDOMEN 7/27/2021 3:31 pm COMPARISON: Abdomen radiograph 07/27/2021 HISTORY: ORDERING SYSTEM PROVIDED HISTORY: single AXR ok. eval for passage of contrast from stomach from upper GI. last AXR only showed inferior portion of stomach. TECHNOLOGIST PROVIDED HISTORY: Reason for exam:->single AXR ok. eval for passage of contrast from stomach from upper GI. last AXR only showed inferior portion of stomach. Acuity: Unknown FINDINGS: Contrast is again noted large and small bowel. Dependent contrast seen within the stomach fundus. There is increased overall passage of contrast into small bowel in the left upper quadrant. Distended air-filled dilated loop of large bowel is noted. Air can be seen in distal large bowel. Limited evaluation for free air. Lung bases are clear. 1. Increase passage of contrast from stomach to small bowel with some residual contrast seen in the stomach fundus. 2. Dilated loops of air-filled large bowel representing ileus or partial obstruction. XR ABDOMEN (KUB) (SINGLE AP VIEW)    Result Date: 7/27/2021  EXAMINATION: ONE SUPINE XRAY VIEW(S) OF THE ABDOMEN 7/27/2021 3:02 pm COMPARISON: Upper GI earlier same date. CT abdomen and pelvis July 26, 2021.  HISTORY: ORDERING SYSTEM PROVIDED HISTORY: CONTRAST FOLLOW UP AFTER UGI TECHNOLOGIST PROVIDED HISTORY: Reason for exam:->CONTRAST FOLLOW UP AFTER UGI Acuity: Acute Type of Exam: Subsequent/Follow-up FINDINGS: Some dense material is again seen throughout the colon from prior study, similar to prior CT. Upper abdomen is excluded. Some dense contrast is seen at the very top of the image which appears to represent some residual contrast in the gastric fundus. Moderate gaseous distension of the stomach. Contrast is seen within a few proximal small bowel loops but does not extend to the distal small bowel. This is progressed compared to prior study where it all this in the stomach. RECOMMENDATION: Partial emptying of the stomach with some contrast seen within a few proximal small bowel loops. Residual contrast is still seen within the gastric fundus, incompletely included on these images. Moderate gaseous distension of the distal stomach. FL UGI    Result Date: 7/27/2021  EXAMINATION: SINGLE CONTRAST UPPER GI SERIES 7/27/2021 HISTORY: ORDERING SYSTEM PROVIDED HISTORY: Gastric distention TECHNOLOGIST PROVIDED HISTORY: Reason for exam:->Gastric distention Reason for Exam: Gastric distention Acuity: Unknown Type of Exam: Unknown COMPARISON: None. TECHNIQUE: Multiple single contrast images of the esophagus, gastroesophageal junction and stomach were obtained following the oral administration of thin barium FLUOROSCOPY DOSE AND TYPE OR TIME AND EXPOSURES: 23 spot images 1 minute 42 seconds fluoroscopy FINDINGS: Patient could not tolerate routine standard positioning. Patient was evaluated in the semi upright position only Patient swallowed barium without difficulty. No obstructing mass seen within the esophagus. Barium flows freely into the stomach. Similar to recent CT scan, stomach is markedly distended The patient could not tolerate routine standard positioning, therefore, evaluation for stomach emptying in the fluoroscopy suite in a timely manner was not possible Degenerative changes are seen in the spine. Markedly distended stomach.   Stomach emptying was not visualized during the study. Stomach is markedly distended. Given the lack of stomach emptying, delayed radiographs are recommended to assess for contrast emptying from the stomach. No obstructing mass in the esophagus     Scheduled Meds:   enoxaparin  40 mg Subcutaneous Nightly    pantoprazole  40 mg Intravenous BID    vancomycin  1,000 mg Intravenous Q24H    metoclopramide  5 mg Intravenous Q6H    metroNIDAZOLE  500 mg Intravenous Q8H    ciprofloxacin  400 mg Intravenous Q12H    sodium chloride flush  5-40 mL Intravenous 2 times per day    rosuvastatin  10 mg Oral Nightly    trospium  20 mg Oral BID     Continuous Infusions:   dextrose 5% in lactated ringers 80 mL/hr at 07/29/21 0543    dextrose      sodium chloride 25 mL (07/27/21 1434)     PRN Meds:.glucose, dextrose, glucagon (rDNA), dextrose, sodium chloride flush, sodium chloride, ondansetron **OR** ondansetron, polyethylene glycol, acetaminophen **OR** acetaminophen      Assessment:  78 y.o. male admitted with   1. Syncope and collapse    2. Hematoma of scalp, initial encounter    3. Anticoagulated    4. Brain mass    5. Elevated serum creatinine        Generalized ileus, gastric distension  Constipation   Forehead hematoma  Syncope and collapse      Plan: 1. Exam remains relatively benign, minimally distended; UGI study with delayed gastric emptying, EGD negative for mechanical obstruction or severe PUD, continue Reglan  2. Tolerating full liquids, advance to general diet as tolerated; monitor bowel function--passing stool  3. IV hydration until PO intake is adequate; monitor and correct electrolytes  4. Activity as tolerated  5. PRN analgesics and antiemetics--minimizing narcotics as tolerated  6. Management of medical comorbid etiologies per primary team and consulting services  7.  Disposition: Okay for discharge back to nursing facility from a surgical perspective    EDUCATION:  Educated patient on plan of care and disease process--all questions answered. Plans discussed with patient, family, and nursing. Reviewed and discussed with Dr. Gracia Love. Signed:  GLENDY Esparza - CNP  7/29/2021 10:45 AM     Surg Staff:     Pt seen and examined with NP  See full note above  Pt has had ability to reinitiate po, doing well with family today  Abd is soft and ND.  Improved  We are not planning on needing any surgery, will see pt prn, thanks    Yaneth Rausch MD

## 2021-07-29 NOTE — PROGRESS NOTES
Comprehensive Nutrition Assessment    Type and Reason for Visit:  Reassess    Nutrition Recommendations/Plan:   1. Full liquid diet, advance as tolerated  2. Ensure Enlive TID    Nutrition Assessment:  Pt remains nutritionally compromised AEB inadequate oral intake over past 11 days of admission. Diet has been advanced to full liquids & pt reports is tolerating well. Encouraged ONS to help improve overall nutrition status. Pt agrees to try Ensure with meals TID. Will monitor for tolerance of po diet as advanced beyond liquids & acceptance of ONS. Malnutrition Assessment:  Malnutrition Status: At risk for malnutrition (Comment)    Context:  Acute Illness     Findings of the 6 clinical characteristics of malnutrition:  Energy Intake:  7 - 50% or less of estimated energy requirements for 5 or more days  Weight Loss:  No significant weight loss     Body Fat Loss:  Unable to assess     Muscle Mass Loss:  Unable to assess    Fluid Accumulation:  No significant fluid accumulation     Strength:  Not Performed    Estimated Daily Nutrient Needs:  Energy (kcal):  8931-5220; Weight Used for Energy Requirements:   (con't to use 64kg)     Protein (g):  77-96 grams; Weight Used for Protein Requirements:   (1.2-1.5 grams per 64 kg)        Fluid (ml/day):   ; Method Used for Fluid Requirements:  1 ml/kcal      Nutrition Related Findings:  active BS; LBM 7/25; gluc 152      Wounds:  None       Current Nutrition Therapies:    ADULT DIET; Easy to Chew; Low Fiber    Anthropometric Measures:  · Height: 5' 4\" (162.6 cm)  · Current Body Weight: 156 lb (70.8 kg)   · Admission Body Weight:      · Usual Body Weight:       · Ideal Body Weight: 130 lbs; % Ideal Body Weight 120 %   · BMI: 26.8  · Adjusted Body Weight:  ; No Adjustment   · Adjusted BMI:      · BMI Categories: Overweight (BMI 25.0-29. 9)       Nutrition Diagnosis:   · Inadequate oral intake related to altered GI function as evidenced by NPO or clear liquid status due to medical condition, intake 26-50%      Nutrition Interventions:   Food and/or Nutrient Delivery:  Continue Current Diet, Start Oral Nutrition Supplement  Nutrition Education/Counseling:  Education not indicated   Coordination of Nutrition Care:  Continue to monitor while inpatient    Goals:  po intake at least 50% of meals & supplements (new goal set 7/29)       Nutrition Monitoring and Evaluation:   Behavioral-Environmental Outcomes:  None Identified   Food/Nutrient Intake Outcomes:  Diet Advancement/Tolerance, Food and Nutrient Intake, Supplement Intake, IVF Intake  Physical Signs/Symptoms Outcomes:  GI Status     Discharge Planning:     Too soon to determine     Electronically signed by Raymond Sousa RD, LD on 7/29/21 at 11:33 AM EDT    Contact: [phone]

## 2021-07-29 NOTE — PROGRESS NOTES
Nightly    trospium  20 mg Oral BID     PRN Meds: glucose, dextrose, glucagon (rDNA), dextrose, sodium chloride flush, sodium chloride, ondansetron **OR** ondansetron, polyethylene glycol, acetaminophen **OR** acetaminophen      Intake/Output Summary (Last 24 hours) at 7/29/2021 1453  Last data filed at 7/28/2021 1611  Gross per 24 hour   Intake 360 ml   Output 400 ml   Net -40 ml       Physical Exam Performed:    /69   Pulse 97   Temp 97.6 °F (36.4 °C) (Oral)   Resp 16   Ht 5' 4\" (1.626 m)   Wt 156 lb 1.4 oz (70.8 kg)   SpO2 97%   BMI 26.79 kg/m²     General appearance: No apparent distress, appears stated age and cooperative. HEENT: Pupils equal, round, and reactive to light. Conjunctivae/corneas clear. Neck: Supple, with full range of motion. No jugular venous distention. Trachea midline. Respiratory:  Normal respiratory effort. Clear to auscultation, bilaterally without Rales/Wheezes/Rhonchi. Cardiovascular: Regular rate and rhythm with normal S1/S2 without murmurs, rubs or gallops. L PPM site is swollen  Abdomen: Soft, non-tender, mildly distended with normal bowel sounds. Musculoskeletal: No clubbing, cyanosis or edema bilaterally. Full range of motion without deformity. Skin: Skin color, texture, turgor normal.  No rashes or lesions. Neurologic:  Neurovascularly intact without any focal sensory/motor deficits.  Cranial nerves: II-XII intact, grossly non-focal.  Psychiatric: Alert and oriented, thought content appropriate, normal insight  Capillary Refill: Brisk,3 seconds, normal   Peripheral Pulses: +2 palpable, equal bilaterally       Labs:   Recent Labs     07/27/21  0529 07/28/21  0522   WBC 6.0 6.4   HGB 12.1* 12.4*   HCT 34.9* 36.1*    205     Recent Labs     07/27/21  0529 07/28/21  0522 07/29/21  0639   * 134* 139   K 3.5 3.5 3.9    100 105   CO2 24 27 27   BUN 7 6* 6*   CREATININE 1.2 1.1 1.2   CALCIUM 8.5 9.0 8.4   PHOS  --  3.0  --      No results for input(s): AST, ALT, BILIDIR, BILITOT, ALKPHOS in the last 72 hours. No results for input(s): INR in the last 72 hours. No results for input(s): Ramya Giles in the last 72 hours. Urinalysis:      Lab Results   Component Value Date    NITRU Negative 07/21/2021    WBCUA 1 07/21/2021    RBCUA 3 07/21/2021    BLOODU Negative 07/21/2021    SPECGRAV 1.015 07/21/2021    GLUCOSEU Negative 07/21/2021       Radiology:  XR ABDOMEN (KUB) (SINGLE AP VIEW)   Final Result   1. Increase passage of contrast from stomach to small bowel with some   residual contrast seen in the stomach fundus. 2. Dilated loops of air-filled large bowel representing ileus or partial   obstruction. XR ABDOMEN (KUB) (SINGLE AP VIEW)   Final Result   This is progressed compared to prior study where it all this in the stomach. RECOMMENDATION:   Partial emptying of the stomach with some contrast seen within a few proximal   small bowel loops. Residual contrast is still seen within the gastric   fundus, incompletely included on these images. Moderate gaseous distension of the distal stomach. FL UGI   Final Result   Markedly distended stomach. Stomach emptying was not visualized during the   study. Stomach is markedly distended. Given the lack of stomach emptying,   delayed radiographs are recommended to assess for contrast emptying from the   stomach. No obstructing mass in the esophagus         CT ABDOMEN PELVIS W IV CONTRAST Additional Contrast? Oral   Final Result   Minimal gastric distention. No obstructive lesion is appreciated. Cholelithiasis. Trace pericardial effusion. Small bilateral pleural effusions with bibasilar   atelectasis. Mild hypertrophy of the prostate gland.          XR ABDOMEN (2 VIEWS)   Final Result   Decreased gastric distension         XR ABDOMEN (KUB) (SINGLE AP VIEW)   Final Result   Stable appearance of the abdomen with gaseous distention of the stomach and scattered nondistended air-filled loops of bowel throughout. XR ABDOMEN (KUB) (SINGLE AP VIEW)   Final Result   Moderate gastric distension. Otherwise nonspecific bowel-gas pattern. Improved distention of the sigmoid colon         XR CHEST PORTABLE   Final Result   Improved aeration of the lung bases. Bandlike opacity remains on the right,   likely atelectasis given its bandlike morphology         XR ABDOMEN (KUB) (SINGLE AP VIEW)   Final Result   Slowly resolving constipation with decreasing bowel dilatation which could   represent a resolving partial obstruction or ileus. NM MYOCARDIAL SPECT REST EXERCISE OR RX   Final Result      XR ABDOMEN (KUB) (SINGLE AP VIEW)   Final Result   Large amount of stool in the colon suggests constipation. Dilated air-filled   loops of small bowel and colon in association. Fecal impaction may be   considered clinically. CT CHEST WO CONTRAST   Final Result   De pendant opacity and bandlike opacities are seen at the lung bases, with   atelectasis favored over pneumonia. Trace pleural effusions are seen. Lytic and sclerotic appearing T9 vertebral body. Some certain measuring not   this represents a typical hemangioma common etiology such as Paget's disease,   or metastatic disease. Consider further characterization with bone scan. CT HEAD WO CONTRAST   Final Result   No acute intracranial abnormality. 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 Enlarging left frontal scalp hematoma. XR CHEST PORTABLE   Final Result   Interval development of bibasilar airspace disease, atelectasis favored over   pneumonia. XR CHEST PORTABLE   Final Result   No acute cardiopulmonary disease. No left-sided pneumothorax following pacer   placement. MRI BRAIN WO CONTRAST   Final Result   1. Left frontal scalp hematoma/edema. No evidence of acute intracranial   hemorrhage.    2. Again seen heterogeneous sellar/suprasellar with mass effect on the optic   chiasm and extension into the left sphenoid sinus. Evaluation is limited   secondary to lack of intravenous contrast (acute kidney injury). 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 3. Involution parenchymal changes with moderate microvascular ischemic   disease. 4. No evidence of acute infarct or midline shift. CT FACIAL BONES WO CONTRAST   Final Result   No acute traumatic injury of the facial bones. CT Cervical Spine WO Contrast   Final Result   No acute abnormality of the cervical spine. Multilevel degenerative disc disease with mild multilevel facet arthropathy. CT Head WO Contrast   Final Result   1. No acute intracranial abnormality. 2. Small left forehead contusion/scalp hematoma. 3. 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 4. Senescent changes. XR CHEST PORTABLE   Final Result   No radiographic evidence of acute cardiopulmonary disease.                  Assessment/Plan:    Active Hospital Problems    Diagnosis     Paralytic ileus of large intestine ([address] [K56.0]     Left arm cellulitis [M64.164]     Hypoglycemia [E16.2]     Encephalopathy, metabolic [V58.64]     PAF (paroxysmal atrial fibrillation) ([facility] [I48.0]     Hematoma of scalp [S00.03XA]     Anticoagulated [Z79.01]     Brain mass [G93.89]     Elevated serum creatinine [R79.89]     Lytic lesion of bone on x-ray [M89.9]     CKD (chronic kidney disease) stage 3, GFR 30-59 ml/min ([facility]) [N18.30]     Essential hypertension [I10]     FUO (fever of unknown origin) [R50.9]     Fecal impaction ([facility]) [K56.41]     Dementia ([facility]) [F03.90]     History of deep venous thrombosis (DVT) of distal vein of right lower extremity [Z86.718]     Atelectasis [J98.11]     S/P placement of cardiac pacemaker [Z95.0]     Hyponatremia [E87.1]     AV block, Mobitz 2 [I44.1]     Acute kidney injury superimposed on CKD ([facility] [N17.9, N18.9]     Syncope and collapse [R55]      1. Cipro and Flagyl IV per ID  2. Resume Eliquis   3. Continue Vanco IV for L arm cellulitis per PharmD  4. NO NARCOTICS OR NEUROSEDATING MEDS  5. POCT glucose q6h and PRN; hypoglycemia orders   6. Stopped Ditropan and Flomax given recurrent orthostasis; monitor for urinary retention and place Douglas if PVR > 300 cc  7. Neurosurgery consult for sellar mass appreciated; follow up with Dr Mao Denise as OP  8. Diet per Surgery  9. SPECT negative  10. Did not tolerate NGT  11. Stop Reglan 5 mg IV q6h  12. Surgery consult for stomach distension appreciated  13. SCD  14. SNF upon DC  15. Stop IVF; monitor for hypoglycemia      DVT Prophylaxis: Eliquis  Diet: ADULT DIET; Easy to Chew; Low Fiber  Adult Oral Nutrition Supplement; Standard High Calorie/High Protein Oral Supplement  Code Status: DNR-CCA    PT/OT Eval Status: Following    Dispo - [facility]    Discussed with patient, CM and nursing. D/w daughter at bedside. Guarded prognosis. Medically stable for DC to SNF today.     Lisandro Newsome MD

## 2021-07-29 NOTE — PLAN OF CARE
Problem: Falls - Risk of:  Goal: Will remain free from falls  Description: Will remain free from falls  Outcome: Ongoing  Note: Pt is wearing the fall bracelet, S.A.F.E. sign is posted outside door, and yellow blanket is on the bed. Pt informed of fall risks, verbalizes understanding, and agrees to ask for help to ambulate. Will monitor.       Problem: Pain:  Goal: Pain level will decrease  Description: Pain level will decrease  Outcome: Ongoing     Problem: Cardiovascular  Goal: No DVT, peripheral vascular complications  Outcome: Ongoing     Problem: Cardiovascular  Goal: Hemodynamic stability  Outcome: Ongoing     Problem: Skin Integrity:  Goal: Absence of new skin breakdown  Description: Absence of new skin breakdown  Outcome: Ongoing

## 2021-07-29 NOTE — CARE COORDINATION
The writer spoke with Shahid Bryson at [facility] admissions regarding pt's readiness for d/c. She stated will still accept patient  but will have to initiate a new precert with [facility] and will need updated therapy notes. Therapy informed of the need for updated therapy notes. Writer also spoke with patient and daughter, both agreeable to pt going to the facility, also confirmed that  pt has [employer] and confirmed with the  Financial counselor . Will continue to follow.

## 2021-07-29 NOTE — PROGRESS NOTES
Updated pt and children on plan of care. All questions answered. Pt and children denies further questions at this time. Pt and children denies need for RN to call and update additional family members at this time. RN educated pt and children to call for additional questions. Pt and children satisfied.

## 2021-07-29 NOTE — PROGRESS NOTES
Occupational Therapy  Facility/Department: [facility] 3A NURSING  Daily Treatment Note  NAME: Triny Aguirre  : 1940  MRN: [id number]    Date of Service: 2021    Discharge Recommendations: Triny Aguirre scored a 13/24 on the AM-PAC ADL Inpatient form. Current research shows that an AM-PAC score of 17 or less is typically not associated with a discharge to the patient's home setting. Based on the patient's AM-PAC score and their current ADL deficits, it is recommended that the patient have 3-5 sessions per week of Occupational Therapy at d/c to increase the patient's independence. Please see assessment section for further patient specific details. If patient discharges prior to next session this note will serve as a discharge summary. Please see below for the latest assessment towards goals. OT Equipment Recommendations  Equipment Needed: No  Other: Defer to d/c location    Assessment   Performance deficits / Impairments: Decreased functional mobility ; Decreased ADL status; Decreased safe awareness;Decreased balance;Decreased high-level IADLs  Assessment: Pt is an 78 y.o. M with hx of dementia who had a pacemaker placed . Pt is making progress towards his goals, but he  is currently below functional baseline and would benefit from continued acute OT to progress functional outcomes. Treatment Diagnosis: NAMRATA  OT Education: OT Role;Plan of Care;Precautions;Orientation;Transfer Training;ADL Adaptive Strategies  Patient Education: Will require repetition  Barriers to Learning: cognition  REQUIRES OT FOLLOW UP: Yes  Activity Tolerance  Activity Tolerance: Patient Tolerated treatment well;Treatment limited secondary to decreased cognition;Treatment limited secondary to medical complications (free text)  Activity Tolerance: BP WFL during supine and sitting (120/71, HR 96 supine; 121/75, HR 99 sitting), demonstrates drop in BP with standing (104/65, ).  Lethargic throughout, requires cuing intermittently to keep eyes open during tasks  Safety Devices  Safety Devices in place: Yes  Type of devices: Left in bed;Call light within reach; Bed alarm in place;Gait belt;Nurse notified; All fall risk precautions in place; Patient at risk for falls (safety camera on)  Restraints  Initially in place: No         Patient Diagnosis(es): The primary encounter diagnosis was Syncope and collapse. Diagnoses of Hematoma of scalp, initial encounter, Anticoagulated, Brain mass, and Elevated serum creatinine were also pertinent to this visit. has a past medical history of Arthritis, Brain aneurysm, Dementia ([address] Enlarged prostate, Essential hypertension, Foot pain, History of intracranial hemorrhage, Overactive bladder, Pituitary mass ([address] and Right leg DVT ([address] has a past surgical history that includes Appendectomy (0282); Mouth surgery (2020); and Upper gastrointestinal endoscopy (N/A, 7/28/2021). Restrictions  Restrictions/Precautions  Restrictions/Precautions: Cardiac, Fall Risk (Pacemaker, High fall risk)  Required Braces or Orthoses?: Yes (no lifting, no overhead/shoulder flexion overhead x 30 days)  Implants present? : Pacemaker  Position Activity Restriction  Other position/activity restrictions: Kaleb Pool is a 78 y.o. male who presents to the emergency department after a syncopal episode with head injury. Patient has history of dementia. Presents here with his daughter who he lives with and is his caregiver. Daughter states that he was coming out of his room using his cane when all of a sudden he became wobbly and then fell face forward and had a syncopal episode. He is anticoagulated on Eliquis with history of DVT in his right leg. Has history of a brain aneurysm and pituitary mass. Patient denies any pain at this time. Daughter states he has been eating less over the past few days. Has some abrasions over his face.   Denies visual changes, headache, chest pain, shortness breath, Appropriate responses to stimuli  Following Commands: Follows one step commands with repetition; Follows one step commands with increased time  Attention Span: Difficulty attending to directions  Memory: Decreased recall of biographical Information;Decreased recall of precautions;Decreased recall of recent events;Decreased short term memory  Safety Judgement: Decreased awareness of need for assistance;Decreased awareness of need for safety  Problem Solving: Assistance required to generate solutions;Assistance required to implement solutions;Assistance required to identify errors made  Insights: Not aware of deficits  Initiation: Requires cues for some  Sequencing: Requires cues for some                                         Plan   Plan  Times per week: 3-5x  Times per day: Daily  Current Treatment Recommendations: Functional Mobility Training, Balance Training, Safety Education & Training, Self-Care / ADL, Home Management Training, Patient/Caregiver Education & Training, Endurance Training  G-Code     OutComes Score                                                  AM-PAC Score        AM-[facility] Inpatient Daily Activity Raw Score: 13 (07/29/21 [facility])  AM-PAC Inpatient ADL T-Scale Score : 32.03 (07/29/21 [facility])  ADL Inpatient CMS 0-100% Score: 63.03 (07/29/21 [facility])  ADL Inpatient CMS G-Code Modifier : CL (07/29/21 [facility])    Goals  Short term goals  Time Frame for Short term goals: d/c  Short term goal 1: Pt will complete LBD with CGA--ongoing 7/29  Short term goal 2: Pt will complete toilet transfer with CGA--ongoing 7/29  Short term goal 3: pt will complete toileting with CGA--ongoing 7/29  Short term goal 4: Pt will complete UB bathing with CGA--SBA 7/22--GOAL MET  Short term goal 5: NEW GOAL: SBA for functional mobility to/from bathroom w/RW-- ongoing 7/29  Long term goals  Time Frame for Long term goals : STGs=LTGs       Therapy Time   Individual Concurrent Group Co-treatment   Time In       1215   Time Out       1300 Minutes       45      Timed Code Treatment Minutes:  43 minutes    Total Treatment Minutes:  43 minutes    Vince Acharya, [address] [facility] OTR/L [id number]    Vince Acharya, OT

## 2021-07-30 NOTE — PLAN OF CARE
Problem: Falls - Risk of:  Goal: Will remain free from falls  Description: Will remain free from falls  Outcome: Ongoing  Goal: Absence of physical injury  Description: Absence of physical injury  Outcome: Ongoing     Problem: Pain:  Goal: Pain level will decrease  Description: Pain level will decrease  Outcome: Ongoing  Goal: Control of acute pain  Description: Control of acute pain  Outcome: Ongoing  Goal: Control of chronic pain  Description: Control of chronic pain  Outcome: Ongoing     Problem: Fluid Volume:  Goal: Ability to achieve a balanced intake and output will improve  Description: Ability to achieve a balanced intake and output will improve  Outcome: Ongoing     Problem: Physical Regulation:  Goal: Ability to maintain clinical measurements within normal limits will improve  Description: Ability to maintain clinical measurements within normal limits will improve  Outcome: Ongoing  Goal: Will show no signs and symptoms of electrolyte imbalance  Description: Will show no signs and symptoms of electrolyte imbalance  Outcome: Ongoing     Problem: Cardiovascular  Goal: No DVT, peripheral vascular complications  Outcome: Ongoing  Goal: Hemodynamic stability  Outcome: Ongoing  Goal: Anticoagulate/Hct stable  Outcome: Ongoing  Goal: Agreement to quit smoking  Outcome: Ongoing  Goal: Weight maintained or lost  Outcome: Ongoing  Goal: Understanding of dietary restrictions  Outcome: Ongoing     Problem: Skin Integrity:  Goal: Will show no infection signs and symptoms  Description: Will show no infection signs and symptoms  Outcome: Ongoing  Goal: Absence of new skin breakdown  Description: Absence of new skin breakdown  Outcome: Ongoing     Problem: Musculor/Skeletal Functional Status  Goal: Highest potential functional level  Outcome: Ongoing  Goal: Absence of falls  Outcome: Ongoing     Problem: Nutrition  Goal: Optimal nutrition therapy  Outcome: Ongoing     Problem:  Bowel/Gastric:  Goal: Ability to achieve a regular elimination pattern will improve  Description: Ability to achieve a regular elimination pattern will improve  Outcome: Ongoing  Goal: Control of bowel function will improve  Description: Control of bowel function will improve  Outcome: Ongoing  Goal: Will not experience complications related to bowel motility  Description: Will not experience complications related to bowel motility  Outcome: Ongoing     Problem: Nutritional:  Goal: Maintenance of adequate nutrition will improve  Description: Maintenance of adequate nutrition will improve  Outcome: Ongoing

## 2021-07-30 NOTE — CARE COORDINATION
JERRICA spoke with Pastor Duong at [facility] stated have not received the precert yet. She called [facility]) was informed it was still pending and  not able to expedite . Transport was cancelled  .   SUSHANT completed- Document ID  [id number]

## 2021-07-30 NOTE — PROGRESS NOTES
Gastroenterology Progress Note    Roldan Bobby is a 78 y.o. male patient. 1. Syncope and collapse    2. Hematoma of scalp, initial encounter    3. Anticoagulated    4. Brain mass    5. Elevated serum creatinine        SUBJECTIVE:  No N/V or abdominal pain. ROS:  No fever, chills  No chest pain, palpitations  No SOB, cough      Physical    VITALS:  /81   Pulse 104   Temp 98 °F (36.7 °C) (Oral)   Resp 16   Ht 5' 4\" (1.626 m)   Wt 155 lb 3.3 oz (70.4 kg)   SpO2 96%   BMI 26.64 kg/m²   TEMPERATURE:  Current - Temp: 98 °F (36.7 °C); Max - Temp  Av.6 °F (36.4 °C)  Min: 97.3 °F (36.3 °C)  Max: 98 °F (36.7 °C)    NAD  RRR, Nl s1s2  Lungs CTA Bilaterally, normal effort  Abdomen soft, ND, NT, no HSM, Bowel sounds normal  alert    Data    Data Review:    Recent Labs     21  0522   WBC 6.4   HGB 12.4*   HCT 36.1*   MCV 92.8        Recent Labs     21  0522 21  0639 21  0608   * 139 134*   K 3.5 3.9 3.7    105 103   CO2 27 27 25   PHOS 3.0  --   --    BUN 6* 6* 5*   CREATININE 1.1 1.2 1.2     No results for input(s): AST, ALT, ALB, BILIDIR, BILITOT, ALKPHOS in the last 72 hours. No results for input(s): LIPASE, AMYLASE in the last 72 hours. No results for input(s): PROTIME, INR in the last 72 hours. No results for input(s): PTT in the last 72 hours. ASSESSMENT     Gastric distention -upper GI with marked distention of the stomach with no passage of contrast initially but did pass on later AXR. EGD  unremarkable, patent pylorus. No N/V or pain.      Per nursing, pt is tolerating a regular diet and having BM      PLAN      Will be available if needed further    Jos Reddy MD  [address] [facility]

## 2021-07-30 NOTE — ACP (ADVANCE CARE PLANNING)
Advanced Care Planning Note. Purpose of Encounter: Advanced care planning in light of acute metabolic encephalopathy  Parties In Attendance: Patient  Decisional Capacity: Limited  Subjective: Patient denies CP, SOB  Objective: Cr 1.2  Goals of Care Determination: Patient wants full support. Daughter (POA) wants limited support (NO CPR, no vent, no HD, no trach, no PEG, no surgery)  Plan:  IV Abx. Eliquis. SNF placement. Surgery consult  Code Status: DNR CCA   Time spent on Advanced care Plannin minutes  Advanced Care Planning Documents: Completed advanced directives on chart, children (son and daughter) share the POA.     Dorita Timmons MD  2021 11:50 AM

## 2021-07-30 NOTE — PROGRESS NOTES
Loss of IV access prior to arrival to shift. 4 RNs have tried to regain access without success. MD notified.

## 2021-07-30 NOTE — PROGRESS NOTES
Hospitalist Progress Note      PCP: Morris Liu MD    Date of Admission: 7/17/2021    Chief Complaint: [facility] Course: 79 yo M with history of brain aneurysm, sellar mass, h/o DVT on Eliquis, dementia, CKD 3 who was brought to ER for syncope and fall. In ED, found to have NAMRATA on CKD and scalp hematoma. Admitted as inpatient for further management. Noted to be in Mobitz II, s/p PPM on 7/19/21. SPECT requested per Cardio to r/o CAD. Developed fevers on evening of 7/20 and has more swelling in L PPM pocket on 7/21/21; Eliquis held on 7/21/21. Noted to have atelectasis and fecal impaction. Molasses enema ordered on 7/21. Will go to [facility] upon DC. SPECT negative on 7/22. ID consulted for fevers. Neuro and NS consult consulted for waxing waning mental status and possible relation to intracranial mass; recommend outpatient f/u. ID recommended Cipro and Flagyl. Started on Vanco IV for L arm cellulitis on 7/24. Has been hypoglycemic, started on D5 IVF and POCT with hypoglycemia orders PRN. Kept NPO, started IV Reglan, ordered NGT and molasses enema. Surgery consulted. Discussed extensively with daughter on phone. Changed to DNR-CCA. EGD on 7/28/21 Unremarkable EGD. Pylorus was patent. No significant retained debris. Tolerating diet. IVF stopped. DC delayed on 7/29/21 due to insurance issues. Vanco IV for L arm cellulitis finished on 7/30/21. Will finish course of PO Cipro and Flagyl by 8/2/21. Subjective:   Patient with no fevers. Awake. No CP, HA or abdominal pain.        Medications:  Reviewed    Infusion Medications    dextrose      sodium chloride Stopped (07/29/21 1526)     Scheduled Medications    ciprofloxacin  500 mg Oral 2 times per day    metroNIDAZOLE  500 mg Oral 3 times per day    apixaban  5 mg Oral BID    pantoprazole  40 mg Intravenous BID    vancomycin  1,000 mg Intravenous Q24H    sodium chloride flush  5-40 mL Intravenous 2 times per day    rosuvastatin  10 mg Oral Nightly    trospium  20 mg Oral BID     PRN Meds: glucose, dextrose, glucagon (rDNA), dextrose, sodium chloride flush, sodium chloride, ondansetron **OR** ondansetron, polyethylene glycol, acetaminophen **OR** acetaminophen      Intake/Output Summary (Last 24 hours) at 7/30/2021 1138  Last data filed at 7/30/2021 0702  Gross per 24 hour   Intake 1319.46 ml   Output 200 ml   Net 1119.46 ml       Physical Exam Performed:    /81   Pulse 104   Temp 98 °F (36.7 °C) (Oral)   Resp 16   Ht 5' 4\" (1.626 m)   Wt 155 lb 3.3 oz (70.4 kg)   SpO2 96%   BMI 26.64 kg/m²     General appearance: No apparent distress, appears stated age and cooperative. HEENT: Pupils equal, round, and reactive to light. Conjunctivae/corneas clear. Neck: Supple, with full range of motion. No jugular venous distention. Trachea midline. Respiratory:  Normal respiratory effort. Clear to auscultation, bilaterally without Rales/Wheezes/Rhonchi. Cardiovascular: Regular rate and rhythm with normal S1/S2 without murmurs, rubs or gallops. L PPM site is swollen  Abdomen: Soft, non-tender, mildly distended with normal bowel sounds. Musculoskeletal: No clubbing, cyanosis or edema bilaterally. Full range of motion without deformity. Skin: Skin color, texture, turgor normal.  No rashes or lesions. Neurologic:  Neurovascularly intact without any focal sensory/motor deficits.  Cranial nerves: II-XII intact, grossly non-focal.  Psychiatric: Alert and oriented, thought content appropriate, normal insight  Capillary Refill: Brisk,3 seconds, normal   Peripheral Pulses: +2 palpable, equal bilaterally       Labs:   Recent Labs     07/28/21  0522   WBC 6.4   HGB 12.4*   HCT 36.1*        Recent Labs     07/28/21  0522 07/29/21  0639 07/30/21  0608   * 139 134*   K 3.5 3.9 3.7    105 103   CO2 27 27 25   BUN 6* 6* 5*   CREATININE 1.1 1.2 1.2   CALCIUM 9.0 8.4 8.8   PHOS 3.0  --   --      No results scattered nondistended air-filled loops of bowel throughout. XR ABDOMEN (KUB) (SINGLE AP VIEW)   Final Result   Moderate gastric distension. Otherwise nonspecific bowel-gas pattern. Improved distention of the sigmoid colon         XR CHEST PORTABLE   Final Result   Improved aeration of the lung bases. Bandlike opacity remains on the right,   likely atelectasis given its bandlike morphology         XR ABDOMEN (KUB) (SINGLE AP VIEW)   Final Result   Slowly resolving constipation with decreasing bowel dilatation which could   represent a resolving partial obstruction or ileus. NM MYOCARDIAL SPECT REST EXERCISE OR RX   Final Result      XR ABDOMEN (KUB) (SINGLE AP VIEW)   Final Result   Large amount of stool in the colon suggests constipation. Dilated air-filled   loops of small bowel and colon in association. Fecal impaction may be   considered clinically. CT CHEST WO CONTRAST   Final Result   De pendant opacity and bandlike opacities are seen at the lung bases, with   atelectasis favored over pneumonia. Trace pleural effusions are seen. Lytic and sclerotic appearing T9 vertebral body. Some certain measuring not   this represents a typical hemangioma common etiology such as Paget's disease,   or metastatic disease. Consider further characterization with bone scan. CT HEAD WO CONTRAST   Final Result   No acute intracranial abnormality. 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 Enlarging left frontal scalp hematoma. XR CHEST PORTABLE   Final Result   Interval development of bibasilar airspace disease, atelectasis favored over   pneumonia. XR CHEST PORTABLE   Final Result   No acute cardiopulmonary disease. No left-sided pneumothorax following pacer   placement. MRI BRAIN WO CONTRAST   Final Result   1. Left frontal scalp hematoma/edema. No evidence of acute intracranial   hemorrhage.    2. Again seen heterogeneous sellar/suprasellar with mass effect on the optic   chiasm and extension into the left sphenoid sinus. Evaluation is limited   secondary to lack of intravenous contrast (acute kidney injury). 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 3. Involution parenchymal changes with moderate microvascular ischemic   disease. 4. No evidence of acute infarct or midline shift. CT FACIAL BONES WO CONTRAST   Final Result   No acute traumatic injury of the facial bones. CT Cervical Spine WO Contrast   Final Result   No acute abnormality of the cervical spine. Multilevel degenerative disc disease with mild multilevel facet arthropathy. CT Head WO Contrast   Final Result   1. No acute intracranial abnormality. 2. Small left forehead contusion/scalp hematoma. 3. 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 4. Senescent changes. XR CHEST PORTABLE   Final Result   No radiographic evidence of acute cardiopulmonary disease.                  Assessment/Plan:    Active Hospital Problems    Diagnosis     Paralytic ileus of large intestine ([address] [K56.0]     Left arm cellulitis [C48.234]     Hypoglycemia [E16.2]     Encephalopathy, metabolic [D92.21]     PAF (paroxysmal atrial fibrillation) ([facility]) [I48.0]     Hematoma of scalp [S00.03XA]     Anticoagulated [Z79.01]     Brain mass [G93.89]     Elevated serum creatinine [R79.89]     Lytic lesion of bone on x-ray [M89.9]     CKD (chronic kidney disease) stage 3, GFR 30-59 ml/min ([facility]) [N18.30]     Essential hypertension [I10]     FUO (fever of unknown origin) [R50.9]     Fecal impaction ([facility]) [K56.41]     Dementia ([facility]) [F03.90]     History of deep venous thrombosis (DVT) of distal vein of right lower extremity [Z86.718]     Atelectasis [J98.11]     S/P placement of cardiac pacemaker [Z95.0]     Hyponatremia [E87.1]     AV block, Mobitz 2 [I44.1]     Acute kidney injury superimposed on CKD ([facility] [N17.9, N18.9]     Syncope and collapse [R55]      1. Cipro and Flagyl PO per ID (I have updated DC med rec in case patient is discharged tomorrow; if not, please finish Abx here and update DC med rec)  2. Resumed Eliquis   3. Stop Vanco IV after today for L arm cellulitis per PharmD (He will have 7 days after today)  4. NO NARCOTICS OR NEUROSEDATING MEDS  5. POCT glucose q6h and PRN; hypoglycemia orders   6. Stopped Ditropan and Flomax given recurrent orthostasis; monitor for urinary retention and place Douglas if PVR > 300 cc  7. Neurosurgery consult for sellar mass appreciated; follow up with Dr Yair Schwarz as OP  8. Diet per Surgery  9. SPECT negative  10. Did not tolerate NGT  11. Stopped Reglan 5 mg IV q6h  12. Surgery consult for stomach distension appreciated  13. SCD  14. SNF upon DC  15. Stopped IVF; monitor for hypoglycemia      DVT Prophylaxis: Eliquis  Diet: ADULT DIET; Easy to Chew; Low Fiber  Adult Oral Nutrition Supplement; Standard High Calorie/High Protein Oral Supplement  Code Status: DNR-CCA    PT/OT Eval Status: Following    Dispo - [facility]    Discussed with patient, CM and nursing. Updated DC med rec, signed OLLIE and started DC summary. F/U appts have been updated in DC section. Medically stable for DC to SNF since 7/29/21; DC delayed due to insurance issues.     Kya Hernandez MD

## 2021-07-30 NOTE — DISCHARGE SUMMARY
Hospital Medicine Discharge Summary    Patient: Carmelina Jarrell     Gender: male  : 1940   Age: 78 y.o. MRN: [id number]    Admitting Physician: Fern Shearer MD  Discharge Physician: Kehinde Miller MD     Code Status: DNR-CCA     Admit Date: 2021   Discharge Date:   21    Disposition:  [facility]    Discharge Diagnoses: Active Hospital Problems    Diagnosis Date Noted    Paralytic ileus of large intestine ([facility] [K56.0]     Left arm cellulitis [Z55.866] 2021    Hypoglycemia [E16.2] 2021    Encephalopathy, metabolic [L47.02]     PAF (paroxysmal atrial fibrillation) ([facility]) [I48.0]     Hematoma of scalp [S00.03XA]     Anticoagulated [Z79.01]     Brain mass [G93.89]     Elevated serum creatinine [R79.89]     Lytic lesion of bone on x-ray [M89.9]     CKD (chronic kidney disease) stage 3, GFR 30-59 ml/min ([facility]) [N18.30] 2021    Essential hypertension [I10] 2021    FUO (fever of unknown origin) [R50.9] 2021    Fecal impaction ([facility] [address] [facility] [K56.41] 2021    Dementia ([facility] [F03.90] 2021    History of deep venous thrombosis (DVT) of distal vein of right lower extremity [Z86.718] 2021    Atelectasis [J98.11] 2021    S/P placement of cardiac pacemaker [Z95.0] 2021    Hyponatremia [E87.1]     AV block, Mobitz 2 [I44.1]     Acute kidney injury superimposed on CKD ([facility] [N17.9, N18.9] 2021    Syncope and collapse [R55] 2021       Follow-up appointments:  one week    Outpatient to do list: F/U with PCP, NS, GI.  F/U with Neuro, ID and Surgery PRN    Condition at Discharge:  Stable    Hospital Course:   77 yo M with history of brain aneurysm, sellar mass, h/o DVT on Eliquis, dementia, CKD 3 who was brought to ER for syncope and fall. In ED, found to have NAMRATA on CKD and scalp hematoma. Admitted as inpatient for further management. Noted to be in Mobitz II, s/p PPM on 21. SPECT requested per Cardio to r/o CAD. Developed fevers on evening of 7/20 and has more swelling in L PPM pocket on 7/21/21; Eliquis held on 7/21/21. Noted to have atelectasis and fecal impaction. Molasses enema ordered on 7/21. Will go to [facility] upon DC. SPECT negative on 7/22. ID consulted for fevers. Neuro and NS consult consulted for waxing waning mental status and possible relation to intracranial mass; recommend outpatient f/u. ID recommended Cipro and Flagyl. Started on Vanco IV for L arm cellulitis on 7/24. Has been hypoglycemic, started on D5 IVF and POCT with hypoglycemia orders PRN. Kept NPO, started IV Reglan, ordered NGT and molasses enema. Surgery consulted. Discussed extensively with daughter on phone. Changed to DNR-CCA. EGD on 7/28/21 Unremarkable EGD. Pylorus was patent. No significant retained debris. Tolerating diet. IVF stopped. DC delayed on 7/29/21 due to insurance issues. Vanco IV for L arm cellulitis finished on 7/30/21. Will finish course of PO Cipro and Flagyl by 8/2/21.   F/U with PCP, EP, NS, GI, Surgery, Neuro and ID.         Discharge Medications:   Current Discharge Medication List      START taking these medications    Details   ciprofloxacin (CIPRO) 500 MG tablet Take 1 tablet by mouth every 12 hours for 3 doses  Qty: 3 tablet, Refills: 0      metroNIDAZOLE (FLAGYL) 500 MG tablet Take 1 tablet by mouth every 8 hours for 3 doses  Qty: 3 tablet, Refills: 0      pantoprazole (PROTONIX) 40 MG tablet Take 1 tablet by mouth every morning (before breakfast)  Qty: 30 tablet, Refills: 0           Current Discharge Medication List      CONTINUE these medications which have CHANGED    Details   dilTIAZem (TIAZAC) 120 MG extended release capsule Take 3 capsules by mouth daily  Qty: 30 capsule, Refills: 0           Current Discharge Medication List      CONTINUE these medications which have NOT CHANGED    Details   trospium (SANCTURA) 20 MG tablet Take 20 mg by mouth 2 times daily      vitamin D (CHOLECALCIFEROL) 25 MCG (1000 UT) TABS tablet Take 1,000 Units by mouth daily      oxybutynin (DITROPAN-XL) 5 MG extended release tablet Take 5 mg by mouth daily      tamsulosin (FLOMAX) 0.4 MG capsule Take 0.4 mg by mouth nightly      rosuvastatin (CRESTOR) 20 MG tablet Take 10 mg by mouth daily       apixaban (ELIQUIS) 5 MG TABS tablet Take 5 mg by mouth 2 times daily      fluticasone (FLONASE) 50 MCG/ACT nasal spray 1 spray by Nasal route daily           Current Discharge Medication List              Discharge Exam:    /81   Pulse 104   Temp 98 °F (36.7 °C) (Oral)   Resp 16   Ht 5' 4\" (1.626 m)   Wt 155 lb 3.3 oz (70.4 kg)   SpO2 96%   BMI 26.64 kg/m²   General appearance: No apparent distress, appears stated age and cooperative. HEENT: Pupils equal, round, and reactive to light. Conjunctivae/corneas clear. Neck: Supple, with full range of motion. No jugular venous distention. Trachea midline. Respiratory:  Normal respiratory effort. Clear to auscultation, bilaterally without Rales/Wheezes/Rhonchi. Cardiovascular: Regular rate and rhythm with normal S1/S2 without murmurs, rubs or gallops. L PPM site is swollen  Abdomen: Soft, non-tender, mildly distended with normal bowel sounds. Musculoskeletal: No clubbing, cyanosis or edema bilaterally. Full range of motion without deformity. Skin: Skin color, texture, turgor normal.  No rashes or lesions. Neurologic:  Neurovascularly intact without any focal sensory/motor deficits. Cranial nerves: II-XII intact, grossly non-focal.  Psychiatric: Alert and oriented, thought content appropriate, normal insight  Capillary Refill: Brisk,3 seconds, normal   Peripheral Pulses: +2 palpable, equal bilaterally     Labs:  For convenience and continuity at follow-up the following most recent labs are provided:    Lab Results   Component Value Date    WBC 6.4 07/28/2021    HGB 12.4 07/28/2021    HCT 36.1 07/28/2021    MCV 92.8 07/28/2021     07/28/2021    NA 134 07/30/2021    K 3.7 07/30/2021    K 3.8 07/21/2021     07/30/2021    CO2 25 07/30/2021    BUN 5 07/30/2021    CREATININE 1.2 07/30/2021    CALCIUM 8.8 07/30/2021    PHOS 3.0 07/28/2021    ALKPHOS 74 07/21/2021    ALT 17 07/21/2021    AST 36 07/21/2021    BILITOT 0.7 07/21/2021    LABALBU 3.1 07/21/2021     Lab Results   Component Value Date    INR 1.66 (H) 04/01/2021    INR 1.99 (H) 03/31/2021    INR 1.86 (H) 03/30/2021       Radiology:  XR ABDOMEN (KUB) (SINGLE AP VIEW)    Result Date: 7/27/2021  EXAMINATION: ONE SUPINE XRAY VIEW(S) OF THE ABDOMEN 7/27/2021 3:31 pm COMPARISON: Abdomen radiograph 07/27/2021 HISTORY: ORDERING SYSTEM PROVIDED HISTORY: single AXR ok. eval for passage of contrast from stomach from upper GI. last AXR only showed inferior portion of stomach. TECHNOLOGIST PROVIDED HISTORY: Reason for exam:->single AXR ok. eval for passage of contrast from stomach from upper GI. last AXR only showed inferior portion of stomach. Acuity: Unknown FINDINGS: Contrast is again noted large and small bowel. Dependent contrast seen within the stomach fundus. There is increased overall passage of contrast into small bowel in the left upper quadrant. Distended air-filled dilated loop of large bowel is noted. Air can be seen in distal large bowel. Limited evaluation for free air. Lung bases are clear. 1. Increase passage of contrast from stomach to small bowel with some residual contrast seen in the stomach fundus. 2. Dilated loops of air-filled large bowel representing ileus or partial obstruction. XR ABDOMEN (KUB) (SINGLE AP VIEW)    Result Date: 7/27/2021  EXAMINATION: ONE SUPINE XRAY VIEW(S) OF THE ABDOMEN 7/27/2021 3:02 pm COMPARISON: Upper GI earlier same date. CT abdomen and pelvis July 26, 2021.  HISTORY: ORDERING SYSTEM PROVIDED HISTORY: CONTRAST FOLLOW UP AFTER UGI TECHNOLOGIST PROVIDED HISTORY: Reason for exam:->CONTRAST FOLLOW UP AFTER UGI Acuity: Acute Type of Exam: Subsequent/Follow-up FINDINGS: Some dense material is again seen throughout the colon from prior study, similar to prior CT. Upper abdomen is excluded. Some dense contrast is seen at the very top of the image which appears to represent some residual contrast in the gastric fundus. Moderate gaseous distension of the stomach. Contrast is seen within a few proximal small bowel loops but does not extend to the distal small bowel. This is progressed compared to prior study where it all this in the stomach. RECOMMENDATION: Partial emptying of the stomach with some contrast seen within a few proximal small bowel loops. Residual contrast is still seen within the gastric fundus, incompletely included on these images. Moderate gaseous distension of the distal stomach. XR ABDOMEN (KUB) (SINGLE AP VIEW)    Result Date: 7/25/2021  EXAMINATION: ONE SUPINE XRAY VIEW(S) OF THE ABDOMEN 7/25/2021 8:14 am COMPARISON: 07/24/2021 radiograph HISTORY: ORDERING SYSTEM PROVIDED HISTORY: Abdominal distension TECHNOLOGIST PROVIDED HISTORY: Reason for exam:->Abdominal distension Reason for Exam: distention Acuity: Unknown Type of Exam: Subsequent/Follow-up FINDINGS: Stable pattern of gaseous distention of the stomach. Distal to this, there are nondistended air-filled loops of small bowel which show relative stability from the prior exam.  Stool and air are mixed in the colon extending distally to the rectum. No evidence of pneumoperitoneum. No abnormal soft tissue mass or calcification. No skeletal abnormality. Stable appearance of the abdomen with gaseous distention of the stomach and scattered nondistended air-filled loops of bowel throughout. XR ABDOMEN (KUB) (SINGLE AP VIEW)    Result Date: 7/24/2021  EXAMINATION: ONE SUPINE XRAY VIEW(S) OF THE ABDOMEN 7/24/2021 4:53 pm COMPARISON: 07/22/2021.  HISTORY: ORDERING SYSTEM PROVIDED HISTORY: Abdominal pain TECHNOLOGIST PROVIDED HISTORY: Reason for exam:->Abdominal pain Reason for Exam: Abdominal pain Acuity: Acute Type of Exam: Initial FINDINGS: Gas and stool throughout the colon. Mild retained stool in the rectosigmoid region. The distention of the sigmoid colon noted previously has significantly improved. Moderate gastric distention is now noted. No focally distended small bowel loops. No pathologic calcifications     Moderate gastric distension. Otherwise nonspecific bowel-gas pattern. Improved distention of the sigmoid colon     XR ABDOMEN (KUB) (SINGLE AP VIEW)    Result Date: 7/22/2021  EXAMINATION: ONE SUPINE XRAY VIEW(S) OF THE ABDOMEN 7/22/2021 5:32 pm COMPARISON: 07/21/2021 HISTORY: ORDERING SYSTEM PROVIDED HISTORY: Ab pain TECHNOLOGIST PROVIDED HISTORY: Reason for exam:->Ab pain Reason for Exam: Abd pain Acuity: Acute Type of Exam: Unknown FINDINGS: There are multiple loops of mildly dilated bowel along the upper abdomen which is less prominent. There is mild feces scattered in the colon which is less prominent. There is no free air. No abnormal calcifications are seen. Slowly resolving constipation with decreasing bowel dilatation which could represent a resolving partial obstruction or ileus. XR ABDOMEN (KUB) (SINGLE AP VIEW)    Result Date: 7/21/2021  EXAMINATION: ONE SUPINE XRAY VIEW(S) OF THE ABDOMEN 7/21/2021 11:35 am COMPARISON: None. HISTORY: ORDERING SYSTEM PROVIDED HISTORY: Constipation TECHNOLOGIST PROVIDED HISTORY: Reason for exam:->Constipation Reason for Exam: Constipation Acuity: Unknown Type of Exam: Unknown FINDINGS: There is no evidence of pneumoperitoneum. A large amount of stool is observed throughout the colon. Dilated, air-filled loops of small bowel and colon are appreciated predominantly in the left abdomen. No abnormal soft tissue mass or calcification. No significant skeletal finding. Large amount of stool in the colon suggests constipation. Dilated air-filled loops of small bowel and colon in association.   Fecal radiation dose to as low as reasonably achievable. COMPARISON: None. HISTORY: ORDERING SYSTEM PROVIDED HISTORY: Head injury and syncope TECHNOLOGIST PROVIDED HISTORY: Reason for exam:-> head injury and syncope Has a \"code stroke\" or \"stroke alert\" been called? ->No Decision Support Exception - unselect if not a suspected or confirmed emergency medical condition->Emergency Medical Condition (MA) Reason for Exam: Fall (pt brought in by [facility] [place] squad from home after passing out at home. pt with hx of low BP and dementia. pt alert and oriented on arrival to department.) Acuity: Acute Type of Exam: Initial Mechanism of Injury: fall FINDINGS: BRAIN/VENTRICLES: There is no acute intracranial hemorrhage, mass effect or midline shift. No abnormal extra-axial fluid collection. The gray-white differentiation is maintained without evidence of an acute infarct. There is prominence of the ventricles and sulci due to global parenchymal volume loss. There are nonspecific areas of hypoattenuation within the periventricular and subcortical white matter, which likely represent chronic microvascular ischemic change. Mass lesion centered at the sella turcica is again demonstrated, infiltrating into the sphenoid sinus, measuring 1.8 x 3 x 1.5 cm (axial series 2, image 12 and sagittal image 46). ORBITS: The visualized portion of the orbits demonstrate no acute abnormality. SINUSES: The visualized paranasal sinuses and mastoid air cells demonstrate no acute abnormality. SOFT TISSUES/SKULL: No acute abnormality of the visualized skull. Small left forehead contusion/scalp hematoma. 1. No acute intracranial abnormality. 2. Small left forehead contusion/scalp hematoma. 3. 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 4. Senescent changes.      CT FACIAL BONES WO CONTRAST    Result Date: 7/17/2021  EXAMINATION: CT OF THE FACE WITHOUT CONTRAST 7/17/2021 4:39 pm TECHNIQUE: CT of the face was performed without the administration of intravenous contrast. Multiplanar reformatted images are provided for review. Dose modulation, iterative reconstruction, and/or weight based adjustment of the mA/kV was utilized to reduce the radiation dose to as low as reasonably achievable. COMPARISON: None HISTORY: ORDERING SYSTEM PROVIDED HISTORY: Facial injury after syncope TECHNOLOGIST PROVIDED HISTORY: Reason for exam:->Facial injury after syncope Decision Support Exception - unselect if not a suspected or confirmed emergency medical condition->Emergency Medical Condition (MA) Reason for Exam: Fall (pt brought in by [facility] squad from home after passing out at home. pt with hx of low BP and dementia. pt alert and oriented on arrival to department.) Acuity: Acute Type of Exam: Initial Mechanism of Injury: fall FINDINGS: FACIAL BONES:  The maxilla, pterygoid plates and zygomatic arches are intact. The mandible is intact. The mandibular condyles are normally situated. Degenerative changes at the TMJ bilaterally. The nasal bones and maxillary nasal processes are intact. ORBITS:  The globes appear intact. The extraocular muscles, optic nerve sheath complexes and lacrimal glands appear unremarkable. No retrobulbar hematoma or mass is seen. The orbital walls and rims are intact. SINUSES/MASTOIDS:  The paranasal sinuses and mastoid air cells are well aerated. No acute fracture is seen. SOFT TISSUES:  No appreciable facial soft tissue swelling is seen. Scalp hematoma in the left frontal region. No acute traumatic injury of the facial bones. CT CHEST WO CONTRAST    Result Date: 7/21/2021  EXAMINATION: CT OF THE CHEST WITHOUT CONTRAST 7/21/2021 9:28 am TECHNIQUE: CT of the chest was performed without the administration of intravenous contrast. Multiplanar reformatted images are provided for review.  Dose modulation, iterative reconstruction, and/or weight based adjustment of the mA/kV was utilized to reduce the radiation dose to as low as reasonably achievable. COMPARISON: None. HISTORY: ORDERING SYSTEM PROVIDED HISTORY: Fevers TECHNOLOGIST PROVIDED HISTORY: Reason for exam:->Fevers Reason for Exam: Fevers Acuity: Unknown Type of Exam: Unknown FINDINGS: Mediastinum: Thyroid gland is unremarkable. Moderate coronary artery calcification is seen. Streak artifact is seen from pacer. Trace pericardial fluid is seen. Small hiatal hernia is seen. There is nonspecific thickening at the GE junction. No significant mediastinal adenopathy Lungs/pleura: Respiratory motion artifact limits evaluation of fine pulmonary parenchymal change. De pendant opacity in bandlike opacity is seen at the lung bases, likely atelectasis. Trace pleural effusions are seen. No obstructing endobronchial lesions are seen. Maite Aquas opacity is seen in the lingula. Upper Abdomen: Adrenal glands appear normal.  Hypodense nodule projects off the left kidney, measuring 2.9 cm, likely cyst Soft Tissues/Bones: Spurring is seen in the spine. Spurring is seen in the shoulder joints. Lytic and sclerotic appearance of the T9 vertebral body is seen. De pendant opacity and bandlike opacities are seen at the lung bases, with atelectasis favored over pneumonia. Trace pleural effusions are seen. Lytic and sclerotic appearing T9 vertebral body. Some certain measuring not this represents a typical hemangioma common etiology such as Paget's disease, or metastatic disease. Consider further characterization with bone scan. CT Cervical Spine WO Contrast    Result Date: 7/17/2021  EXAMINATION: CT OF THE CERVICAL SPINE WITHOUT CONTRAST 7/17/2021 4:39 pm TECHNIQUE: CT of the cervical spine was performed without the administration of intravenous contrast. Multiplanar reformatted images are provided for review.  Dose modulation, iterative reconstruction, and/or weight based adjustment of the mA/kV was utilized to reduce the radiation dose to as low as reasonably achievable. COMPARISON: 03/30/2021. HISTORY: ORDERING SYSTEM PROVIDED HISTORY: Head injury and syncope TECHNOLOGIST PROVIDED HISTORY: Reason for exam:->Head injury and syncope Decision Support Exception - unselect if not a suspected or confirmed emergency medical condition->Emergency Medical Condition (MA) Reason for Exam: Fall (pt brought in by [facility] squad from home after passing out at home. pt with hx of low BP and dementia. pt alert and oriented on arrival to department.) Acuity: Acute Type of Exam: Initial FINDINGS: BONES/ALIGNMENT: There is no acute fracture or traumatic malalignment. DEGENERATIVE CHANGES: Multilevel degenerative disc disease with disc space narrowing at C4-5 through C6-7 and multilevel osteoarthritic spurring. Mild multilevel facet arthropathy. Mild to moderate degenerative change at the C1-2 articulation anteriorly. SOFT TISSUES: There is no prevertebral soft tissue swelling. Mild calcified atherosclerotic plaque at the level the carotid bifurcation. No acute abnormality of the cervical spine. Multilevel degenerative disc disease with mild multilevel facet arthropathy. CT ABDOMEN PELVIS W IV CONTRAST Additional Contrast? Oral    Result Date: 7/26/2021  EXAMINATION: CT OF THE ABDOMEN AND PELVIS WITH CONTRAST 7/26/2021 6:55 pm TECHNIQUE: CT of the abdomen and pelvis was performed with the administration of intravenous contrast. Multiplanar reformatted images are provided for review. Dose modulation, iterative reconstruction, and/or weight based adjustment of the mA/kV was utilized to reduce the radiation dose to as low as reasonably achievable.  COMPARISON: CT chest, 07/21/2021 HISTORY: ORDERING SYSTEM PROVIDED HISTORY: Eval gastric distention TECHNOLOGIST PROVIDED HISTORY: Reason for exam:->Eval gastric distention Additional Contrast?->Oral Reason for Exam: Eval gastric distention Acuity: Acute Type of Exam: Initial FINDINGS: Lower Chest: Cardiac pacer wires are present. There is a trace pericardial effusion. Small bilateral pleural effusions are present. There is mild dependent atelectasis. Organs: The liver is homogeneous and normal in attenuation. There is an intermediate density in the dependent gallbladder, likely stone. The pancreas, spleen and adrenal glands are unremarkable. The kidneys enhance symmetrically. There is no hydronephrosis. There is a simple 3.3 cm cyst of the superior left kidney. GI/Bowel: There is minimal gastric distention. No dilated loops of small bowel are identified. No focal mural thickening of the colon is identified. There is subtle perirectal edema. Pelvis: Urinary bladder is unremarkable. There is mild hypertrophy of the median lobe of the prostate gland. Peritoneum/Retroperitoneum: There is moderate aortoiliac calcification, without aneurysm. No adenopathy. Bones/Soft Tissues: At L4-5 there is grade 1 spondylolisthesis. Lucencies with vertical sclerotic striations are noted at T9 and L3, suggestive of hemangiomas. They appear stable in the interval.  Abdominal wall is unremarkable. Minimal gastric distention. No obstructive lesion is appreciated. Cholelithiasis. Trace pericardial effusion. Small bilateral pleural effusions with bibasilar atelectasis. Mild hypertrophy of the prostate gland. XR CHEST PORTABLE    Result Date: 7/23/2021  EXAMINATION: ONE XRAY VIEW OF THE CHEST 7/23/2021 6:57 am COMPARISON: 07/21/2021 HISTORY: ORDERING SYSTEM PROVIDED HISTORY: Follow-up on bibasilar infiltrates TECHNOLOGIST PROVIDED HISTORY: Reason for exam:->Follow-up on bibasilar infiltrates Reason for Exam: Follow-up on bibasilar infiltrates Acuity: Unknown Type of Exam: Unknown FINDINGS: Left-sided pacer is seen. Heart size is normal.  Mediastinal contours are normal.  Pulmonary vascularity is normal Lung bases are better aerated. Bandlike opacity is seen remaining in the right mid lung.      Improved aeration of the lung bases. Bandlike opacity remains on the right, likely atelectasis given its bandlike morphology     XR CHEST PORTABLE    Result Date: 7/21/2021  EXAMINATION: ONE XRAY VIEW OF THE CHEST 7/21/2021 6:51 am COMPARISON: 07/19/2021, 07/17/2021 HISTORY: ORDERING SYSTEM PROVIDED HISTORY: fever TECHNOLOGIST PROVIDED HISTORY: Reason for exam:->fever Reason for Exam: fever Acuity: Unknown Type of Exam: Unknown FINDINGS: A single frontal view of the chest was performed. There is no acute skeletal abnormality. There is a stable left subclavian pacemaker in proper position. The heart size and mediastinal contours are stable with stable atherosclerotic disease and prominence of the aortic knob. There is new curvilinear opacity within the bilateral lung bases, atelectasis favored over pneumonia. The  lungs are otherwise clear. There is no evidence of a pneumothorax. There is gaseous distention of bowel below the hemidiaphragms. Interval development of bibasilar airspace disease, atelectasis favored over pneumonia. XR CHEST PORTABLE    Result Date: 7/19/2021  EXAMINATION: ONE XRAY VIEW OF THE CHEST 7/19/2021 7:22 pm COMPARISON: 07/17/2021 HISTORY: ORDERING SYSTEM PROVIDED HISTORY: Pacemaker placement and rule out pneumothorax TECHNOLOGIST PROVIDED HISTORY: Reason for exam:->Pacemaker placement and rule out pneumothorax Reason for Exam: Post op Acuity: Acute Type of Exam: Subsequent/Follow-up FINDINGS: The chest was obtained with lordotic technique. The cardiomediastinal silhouette is unremarkable. Aortic vascular calcification. The lungs are clear. No infiltrate, pleural fluid or pneumothorax. Left-sided transvenous pacer is in place. Gaseous distention of bowel loops in the upper abdomen. No acute cardiopulmonary disease. No left-sided pneumothorax following pacer placement.      XR CHEST PORTABLE    Result Date: 7/17/2021  EXAMINATION: ONE XRAY VIEW OF THE CHEST 7/17/2021 4:49 pm COMPARISON: Chest 03/30/2021 HISTORY: ORDERING SYSTEM PROVIDED HISTORY: syncope TECHNOLOGIST PROVIDED HISTORY: Reason for exam:-> syncope Reason for Exam: Fall (pt brought in by [facility] squad from home after passing out at home. pt with hx of low BP and dementia. pt alert and oriented on arrival to department.) Acuity: Unknown Type of Exam: Unknown FINDINGS: The cardiomediastinal and hilar silhouettes appear unremarkable. Low lung volumes. The lungs appear clear. No pleural effusion evident. No pneumothorax is seen. No acute osseous abnormality is identified. No radiographic evidence of acute cardiopulmonary disease. MRI BRAIN WO CONTRAST    Result Date: 7/19/2021  EXAMINATION: MRI OF THE BRAIN WITHOUT CONTRAST  7/19/2021 7:07 am TECHNIQUE: Multiplanar multisequence MRI of the brain was performed without the administration of intravenous contrast. COMPARISON: None. HISTORY: ORDERING SYSTEM PROVIDED HISTORY: syncope and brain mass TECHNOLOGIST PROVIDED HISTORY: Reason for exam:->syncope and brain mass Reason for Exam: Patient had a fall and hit his head. He has a history of brain aneurysm and pituitary tumor. Contrast not ordered due to kidney injury. Acuity: Unknown Type of Exam: Ongoing FINDINGS: INTRACRANIAL STRUCTURES/VENTRICLES: No evidence of acute intracranial hemorrhage. No evidence of acute infarct. Involutional parenchymal changes. Scattered periventricular and deep white matter as well as pontine T2/FLAIR hyperintensities are nonspecific but likely related to microvascular ischemic disease. No ventriculomegaly. 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 There is mass effect on the optic chiasm which appears bowed. There is no convincing evidence of cavernous sinus invasion on nonaddicted examination. The pituitary stock is thickened. Overall the lesion measures approximately 3.2 x 1.9 x 2.0 cm (anteroposterior by transverse by craniocaudal).   Major intracranial Hospital Status: Inpatient. Height: 64 inches Weight: 147 pounds  Risk Factors   The patient risk factors include:physical activity, treated and controlled  hypertension and diabetes mellitus. Conclusions   Summary  Normal myocardial perfusion study. Mild reduction in EF of 45%  Stress Protocols   Resting ECG  AV sequential pacemaker. Resting HR:108 bpm       Resting BP:136/72 mmHg   Pre-stress physical exam: Resting pulse ox 100% RA. Stress Protocol:Pharmacologic - Lexiscan's  Peak HR:117 bpm                       HR/BP product:[id number]  Peak BP:108/46 mmHg  Predicted HR: 137 bpm  % of predicted HR: 85  Test duration: 4 min  Reason for termination:Completed   ECG Findings  Normal response to lexiscan . Symptoms  Developed symptoms likely related to [address] [place] [address]. There was stress induced shortness of breath and abd cramps. Symptoms resolved with aminophylline. Denies any chest pain or discomfort. Pulse ox 100% RA. Complications  Procedure complication was none. Stress Interpretation  ECG portion of stress test is non-diagnostic due to paced rhythm. Procedure Medications   - Lexiscan I.V. 0.4 mg.10 sec   - Aminophylline I.V. 100 mg. Imaging Protocols   - One Day   Rest                          Stress   Isotope:Myoview/Tetrofosmin   Isotope: Myoview/Tetrofosmin  Isotope dose:9.81 mCi         Isotope dose:32.5 mCi  Administration Route:I.V. Administration Route:I.V.  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 Found to be in Mobitz II on telemetry. s/p dual  chamber PPM 7/19/2021.    Signatures ------------------------------------------------------------------  Electronically signed by Dwight Choe MD, [facility] (Interpreting  physician) on 07/22/2021 at 13:46  ------------------------------------------------------------------      FL UGI    Result Date: 7/27/2021  EXAMINATION: SINGLE CONTRAST UPPER GI SERIES 7/27/2021 HISTORY: ORDERING SYSTEM PROVIDED HISTORY: Gastric distention TECHNOLOGIST PROVIDED HISTORY: Reason for exam:->Gastric distention Reason for Exam: Gastric distention Acuity: Unknown Type of Exam: Unknown COMPARISON: None. TECHNIQUE: Multiple single contrast images of the esophagus, gastroesophageal junction and stomach were obtained following the oral administration of thin barium FLUOROSCOPY DOSE AND TYPE OR TIME AND EXPOSURES: 23 spot images 1 minute 42 seconds fluoroscopy FINDINGS: Patient could not tolerate routine standard positioning. Patient was evaluated in the semi upright position only Patient swallowed barium without difficulty. No obstructing mass seen within the esophagus. Barium flows freely into the stomach. Similar to recent CT scan, stomach is markedly distended The patient could not tolerate routine standard positioning, therefore, evaluation for stomach emptying in the fluoroscopy suite in a timely manner was not possible Degenerative changes are seen in the spine. Markedly distended stomach. Stomach emptying was not visualized during the study. Stomach is markedly distended. Given the lack of stomach emptying, delayed radiographs are recommended to assess for contrast emptying from the stomach.  No obstructing mass in the esophagus     XR ABDOMEN (2 VIEWS)    Result Date: 7/26/2021  EXAMINATION: TWO XRAY VIEWS OF THE ABDOMEN 7/26/2021 8:26 am COMPARISON: 07/25/2021 HISTORY: ORDERING SYSTEM PROVIDED HISTORY: Follow up gastric distention/ileus TECHNOLOGIST PROVIDED HISTORY: Reason for exam:->Follow up gastric distention/ileus Reason for Exam: Follow up gastric distention/ileus Acuity: Unknown Type of Exam: Unknown FINDINGS: Persistent but less pronounced gaseous distention of the stomach. Stable intestinal gas pattern. No free subdiaphragmatic air. Atelectasis in the lung bases     Decreased gastric distension       The patient was seen and examined on day of discharge and this discharge summary is in conjunction with any daily progress note from day of discharge. Time Spent on discharge is 45 minutes  in the examination, evaluation, counseling and review of medications and discharge plan. Note that more than 30 minutes was spent in preparing discharge papers, discussing discharge with patient, medication review, etc.       Signed:    Dimitris Linton MD   7/30/2021      Thank you Elbert Dueñas MD for the opportunity to be involved in this patient's care.  If you have any questions or concerns please feel free to contact me at [address] [place] [address]

## 2021-07-30 NOTE — CARE COORDINATION
JERRICA spoke with Erin Hightower at [facility] admissions, precert still pending.  Transport has been set up tentatively for 4 .00 pm via [employer] [place]

## 2021-07-31 NOTE — PLAN OF CARE
Problem: Falls - Risk of:  Goal: Will remain free from falls  Description: Will remain free from falls  Outcome: Ongoing     Problem: Pain:  Goal: Pain level will decrease  Description: Pain level will decrease  Outcome: Ongoing     Problem: Fluid Volume:  Goal: Ability to achieve a balanced intake and output will improve  Description: Ability to achieve a balanced intake and output will improve  Outcome: Ongoing     Problem: Physical Regulation:  Goal: Ability to maintain clinical measurements within normal limits will improve  Description: Ability to maintain clinical measurements within normal limits will improve  Outcome: Ongoing  Goal: Will show no signs and symptoms of electrolyte imbalance  Description: Will show no signs and symptoms of electrolyte imbalance  Outcome: Ongoing     Problem: Cardiovascular  Goal: No DVT, peripheral vascular complications  Outcome: Ongoing  Goal: Hemodynamic stability  Outcome: Ongoing     Problem: Skin Integrity:  Goal: Will show no infection signs and symptoms  Description: Will show no infection signs and symptoms  Outcome: Ongoing  Goal: Absence of new skin breakdown  Description: Absence of new skin breakdown  Outcome: Ongoing     Problem: Musculor/Skeletal Functional Status  Goal: Highest potential functional level  Outcome: Ongoing  Goal: Absence of falls  Outcome: Ongoing     Problem: Nutrition  Goal: Optimal nutrition therapy  Outcome: Ongoing     Problem:  Bowel/Gastric:  Goal: Ability to achieve a regular elimination pattern will improve  Description: Ability to achieve a regular elimination pattern will improve  Outcome: Ongoing

## 2021-07-31 NOTE — PROGRESS NOTES
Hospitalist Progress Note      PCP: Joceline Martinez MD    Date of Admission: 7/17/2021    Chief Complaint: Syncope    Hospital Course:   Patient seen this a.m. Denies any nausea vomiting  Eating and drinking better  Has not had a bowel movement  No acute events on telemetry  No abdominal pain        Medications:  Reviewed      Exam:    /75   Pulse 100   Temp 97.8 °F (36.6 °C) (Axillary)   Resp 16   Ht 5' 4\" (1.626 m)   Wt 155 lb 13.8 oz (70.7 kg)   SpO2 95%   BMI 26.75 kg/m²     General appearance: No apparent distress, appears stated age and cooperative. HEENT: Pupils equal, round, and reactive to light. Conjunctivae/corneas clear. Bruising on the upper lip bridge of the nose and forehead  Neck: Supple, with full range of motion. No jugular venous distention. Trachea midline. Respiratory:  Normal respiratory effort. Clear to auscultation, bilaterally without RALES/WHEEZES/Rhonchi. Cardiovascular: Regular rate and rhythm with normal S1/S2 without MURMURS, rubs or gallops. Abdomen: Soft, non-tender, non-distended with normal bowel sounds. Musculoskeletal: No clubbing, cyanosis or EDEMA bilaterally. Full range of motion without deformity. Skin: Skin color, texture, turgor normal.  No rashes or lesions. Neurologic:  Neurovascularly intact without any focal sensory/motor deficits. Cranial nerves: II-XII intact, grossly non-focal.        Labs:   No results for input(s): WBC, HGB, HCT, PLT in the last 72 hours. Recent Labs     07/29/21  0639 07/30/21  0608 07/31/21  0626    134* 133*   K 3.9 3.7 3.8    103 103   CO2 27 25 23   BUN 6* 5* 7   CREATININE 1.2 1.2 1.2   CALCIUM 8.4 8.8 9.0     No results for input(s): AST, ALT, BILIDIR, BILITOT, ALKPHOS in the last 72 hours. No results for input(s): INR in the last 72 hours. No results for input(s): Toby Hug in the last 72 hours.     Urinalysis:      Lab Results   Component Value Date    NITRU Negative 07/21/2021 WBCUA 1 07/21/2021    RBCUA 3 07/21/2021    BLOODU Negative 07/21/2021    SPECGRAV 1.015 07/21/2021    GLUCOSEU Negative 07/21/2021       Radiology:  XR ABDOMEN (KUB) (SINGLE AP VIEW)   Final Result   1. Increase passage of contrast from stomach to small bowel with some   residual contrast seen in the stomach fundus. 2. Dilated loops of air-filled large bowel representing ileus or partial   obstruction. XR ABDOMEN (KUB) (SINGLE AP VIEW)   Final Result   This is progressed compared to prior study where it all this in the stomach. RECOMMENDATION:   Partial emptying of the stomach with some contrast seen within a few proximal   small bowel loops. Residual contrast is still seen within the gastric   fundus, incompletely included on these images. Moderate gaseous distension of the distal stomach. FL UGI   Final Result   Markedly distended stomach. Stomach emptying was not visualized during the   study. Stomach is markedly distended. Given the lack of stomach emptying,   delayed radiographs are recommended to assess for contrast emptying from the   stomach. No obstructing mass in the esophagus         CT ABDOMEN PELVIS W IV CONTRAST Additional Contrast? Oral   Final Result   Minimal gastric distention. No obstructive lesion is appreciated. Cholelithiasis. Trace pericardial effusion. Small bilateral pleural effusions with bibasilar   atelectasis. Mild hypertrophy of the prostate gland. XR ABDOMEN (2 VIEWS)   Final Result   Decreased gastric distension         XR ABDOMEN (KUB) (SINGLE AP VIEW)   Final Result   Stable appearance of the abdomen with gaseous distention of the stomach and   scattered nondistended air-filled loops of bowel throughout. XR ABDOMEN (KUB) (SINGLE AP VIEW)   Final Result   Moderate gastric distension. Otherwise nonspecific bowel-gas pattern.    Improved distention of the sigmoid colon         XR CHEST PORTABLE   Final Result [R55] 03/30/2021       Acute Medical Issues Being Addressed:    24-year-old admitted to the hospital with syncope    Syncope probably secondary to dehydration with underlying Mobitz type II second-degree heart block with associated orthostatic hypotension  Diltiazem has been discontinued  EKG showed type II second-degree heart block hence he had dual-chamber pacemaker placed  Had an echocardiogram recently in March which showed normal ejection fraction with mild to moderate AR  On telemetry  Also had positive orthostatics which improved with IV fluids        Acute kidney injury prerenal on baseline of chronic kidney disease stage III  Creatinine 1.7 on admission improved with IV fluids back to baseline of around 1.3    Previous DVT  On chronic anticoagulation Eliquis restarted by cardiology    Previous brain aneurysm and suprasellar mass  Followed up with UC  CT head shows unchanged  MRI of the brain noted    Constipation mild ileus and gastric distention  He had a CT abdomen and pelvis  Was initially kept n.p.o.   Seen by general surgery  Repeat CT abdomen showed mild gastric distention  Hence GI were consulted  EGD was normal no obstruction noted his diet was advanced all of his symptoms have resolved he is now tolerating a regular diet  We will start him on some laxative low doses    Overall patient remains stable he is currently awaiting precertification      DVT Prophylaxis: On Eliquis  Diet: ADULT DIET; Easy to Chew; Low Fiber  Adult Oral Nutrition Supplement; Standard High Calorie/High Protein Oral Supplement  Code Status: DNR-CCA      Dispo - once acute medical processes have resolved    Natalia Barfield MD

## 2021-07-31 NOTE — PROGRESS NOTES
Pt assessment complete. VSS. Meds given per MAR. New IV placed at this time. No complaints at this time. Took meds without issues. No needs expressed at this time, Pt encouraged to call with any needs. Call light within reach, will continue to monitor.

## 2021-07-31 NOTE — PLAN OF CARE
Problem: Falls - Risk of:  Goal: Will remain free from falls  Description: Will remain free from falls  7/30/2021 2212 by Danielle Fisher RN  Outcome: Ongoing  7/30/2021 1952 by Fracisco Rivers RN  Outcome: Ongoing  Goal: Absence of physical injury  Description: Absence of physical injury  7/30/2021 2212 by Danielle Fisher RN  Outcome: Ongoing  7/30/2021 1952 by Fracisco Rivers RN  Outcome: Ongoing     Problem: Pain:  Goal: Pain level will decrease  Description: Pain level will decrease  7/30/2021 2212 by Danielle Fisher RN  Outcome: Ongoing  7/30/2021 1952 by Fracisco Rivers RN  Outcome: Ongoing  Goal: Control of acute pain  Description: Control of acute pain  7/30/2021 2212 by Danielle Fisher RN  Outcome: Ongoing  7/30/2021 1952 by Fracisco Rivers RN  Outcome: Ongoing  Goal: Control of chronic pain  Description: Control of chronic pain  7/30/2021 2212 by Danielle Fisher RN  Outcome: Ongoing  7/30/2021 1952 by Fracisco Rivers RN  Outcome: Ongoing     Problem: Fluid Volume:  Goal: Ability to achieve a balanced intake and output will improve  Description: Ability to achieve a balanced intake and output will improve  7/30/2021 2212 by Danielle Fisher RN  Outcome: Ongoing  7/30/2021 1952 by Fracisco Rivers RN  Outcome: Ongoing     Problem: Physical Regulation:  Goal: Ability to maintain clinical measurements within normal limits will improve  Description: Ability to maintain clinical measurements within normal limits will improve  7/30/2021 2212 by Danielle Fisher RN  Outcome: Ongoing  7/30/2021 1952 by Fracisco Rivers RN  Outcome: Ongoing  Goal: Will show no signs and symptoms of electrolyte imbalance  Description: Will show no signs and symptoms of electrolyte imbalance  7/30/2021 2212 by Danielle Fisher RN  Outcome: Ongoing  7/30/2021 1952 by Fracisco Rivers RN  Outcome: Ongoing     Problem: Cardiovascular  Goal: No DVT, peripheral vascular complications  0/42/1110 2212 by Danielle Fisher will improve  7/30/2021 2212 by Jose Guadalupe Gamboa RN  Outcome: Ongoing  7/30/2021 1952 by Billie Kent RN  Outcome: Ongoing  Goal: Control of bowel function will improve  Description: Control of bowel function will improve  7/30/2021 2212 by Jose Guadalupe Gamboa RN  Outcome: Ongoing  7/30/2021 1952 by Billie Kent RN  Outcome: Ongoing  Goal: Will not experience complications related to bowel motility  Description: Will not experience complications related to bowel motility  7/30/2021 2212 by Jose Guadalupe Gamboa RN  Outcome: Ongoing  7/30/2021 1952 by Billie Kent RN  Outcome: Ongoing     Problem: Nutritional:  Goal: Maintenance of adequate nutrition will improve  Description: Maintenance of adequate nutrition will improve  7/30/2021 2212 by Jose Guadalupe Gamboa RN  Outcome: Ongoing  7/30/2021 1952 by Billie Kent RN  Outcome: Ongoing

## 2021-08-01 NOTE — CARE COORDINATION
Called and left another message with Raúl Cohen with [employer] providing my number and asking to call if the precert is obtained.     Electronically signed by TUNDE Pelletier, VONW on 8/1/2021 at 8:46 AM

## 2021-08-01 NOTE — PROGRESS NOTES
Hospitalist Progress Note      PCP: Niya Wilkes MD    Date of Admission: 7/17/2021    Chief Complaint: Syncope    Hospital Course:   Patient seen this a.m. Denies any nausea vomiting  Or just over there is dry  Eating and drinking well  Has not had a bowel movement for 2 days now  Denies any abdominal pain      Medications:  Reviewed      Exam:    BP (!) 135/97   Pulse 106   Temp 97.7 °F (36.5 °C) (Oral)   Resp 16   Ht 5' 4\" (1.626 m)   Wt 152 lb 5.4 oz (69.1 kg)   SpO2 97%   BMI 26.15 kg/m²     General appearance: No apparent distress, appears stated age and cooperative. HEENT: Pupils equal, round, and reactive to light. Conjunctivae/corneas clear. Bruising on the upper lip bridge of the nose and forehead  Neck: Supple, with full range of motion. No jugular venous distention. Trachea midline. Respiratory:  Normal respiratory effort. Clear to auscultation, bilaterally without RALES/WHEEZES/Rhonchi. Cardiovascular: Regular rate and rhythm with normal S1/S2 without MURMURS, rubs or gallops. Abdomen: Soft, non-tender, non-distended with normal bowel sounds. Musculoskeletal: No clubbing, cyanosis or EDEMA bilaterally. Full range of motion without deformity. Skin: Skin color, texture, turgor normal.  No rashes or lesions. Neurologic:  Neurovascularly intact without any focal sensory/motor deficits. Cranial nerves: II-XII intact, grossly non-focal.  Physical exam unchanged from 7/31      Labs:   No results for input(s): WBC, HGB, HCT, PLT in the last 72 hours. Recent Labs     07/30/21  0608 07/31/21  0626 08/01/21  0429   * 133* 138   K 3.7 3.8 4.0    103 106   CO2 25 23 22   BUN 5* 7 8   CREATININE 1.2 1.2 1.3   CALCIUM 8.8 9.0 8.9     No results for input(s): AST, ALT, BILIDIR, BILITOT, ALKPHOS in the last 72 hours. No results for input(s): INR in the last 72 hours. No results for input(s): Driss Clap in the last 72 hours.     Urinalysis:      Lab Results Component Value Date    NITRU Negative 07/21/2021    WBCUA 1 07/21/2021    RBCUA 3 07/21/2021    BLOODU Negative 07/21/2021    SPECGRAV 1.015 07/21/2021    GLUCOSEU Negative 07/21/2021       Radiology:  XR ABDOMEN (KUB) (SINGLE AP VIEW)   Final Result   1. Increase passage of contrast from stomach to small bowel with some   residual contrast seen in the stomach fundus. 2. Dilated loops of air-filled large bowel representing ileus or partial   obstruction. XR ABDOMEN (KUB) (SINGLE AP VIEW)   Final Result   This is progressed compared to prior study where it all this in the stomach. RECOMMENDATION:   Partial emptying of the stomach with some contrast seen within a few proximal   small bowel loops. Residual contrast is still seen within the gastric   fundus, incompletely included on these images. Moderate gaseous distension of the distal stomach. FL UGI   Final Result   Markedly distended stomach. Stomach emptying was not visualized during the   study. Stomach is markedly distended. Given the lack of stomach emptying,   delayed radiographs are recommended to assess for contrast emptying from the   stomach. No obstructing mass in the esophagus         CT ABDOMEN PELVIS W IV CONTRAST Additional Contrast? Oral   Final Result   Minimal gastric distention. No obstructive lesion is appreciated. Cholelithiasis. Trace pericardial effusion. Small bilateral pleural effusions with bibasilar   atelectasis. Mild hypertrophy of the prostate gland. XR ABDOMEN (2 VIEWS)   Final Result   Decreased gastric distension         XR ABDOMEN (KUB) (SINGLE AP VIEW)   Final Result   Stable appearance of the abdomen with gaseous distention of the stomach and   scattered nondistended air-filled loops of bowel throughout. XR ABDOMEN (KUB) (SINGLE AP VIEW)   Final Result   Moderate gastric distension. Otherwise nonspecific bowel-gas pattern.    Improved distention of the sigmoid colon         XR CHEST PORTABLE   Final Result   Improved aeration of the lung bases. Bandlike opacity remains on the right,   likely atelectasis given its bandlike morphology         XR ABDOMEN (KUB) (SINGLE AP VIEW)   Final Result   Slowly resolving constipation with decreasing bowel dilatation which could   represent a resolving partial obstruction or ileus. NM MYOCARDIAL SPECT REST EXERCISE OR RX   Final Result      XR ABDOMEN (KUB) (SINGLE AP VIEW)   Final Result   Large amount of stool in the colon suggests constipation. Dilated air-filled   loops of small bowel and colon in association. Fecal impaction may be   considered clinically. CT CHEST WO CONTRAST   Final Result   De pendant opacity and bandlike opacities are seen at the lung bases, with   atelectasis favored over pneumonia. Trace pleural effusions are seen. Lytic and sclerotic appearing T9 vertebral body. Some certain measuring not   this represents a typical hemangioma common etiology such as Paget's disease,   or metastatic disease. Consider further characterization with bone scan. CT HEAD WO CONTRAST   Final Result   No acute intracranial abnormality. 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 Enlarging left frontal scalp hematoma. XR CHEST PORTABLE   Final Result   Interval development of bibasilar airspace disease, atelectasis favored over   pneumonia. XR CHEST PORTABLE   Final Result   No acute cardiopulmonary disease. No left-sided pneumothorax following pacer   placement. MRI BRAIN WO CONTRAST   Final Result   1. Left frontal scalp hematoma/edema. No evidence of acute intracranial   hemorrhage. 2. Again seen heterogeneous sellar/suprasellar with mass effect on the optic   chiasm and extension into the left sphenoid sinus. Evaluation is limited   secondary to lack of intravenous contrast (acute kidney injury).   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 3. Involution parenchymal changes with moderate microvascular ischemic   disease. 4. No evidence of acute infarct or midline shift. CT FACIAL BONES WO CONTRAST   Final Result   No acute traumatic injury of the facial bones. CT Cervical Spine WO Contrast   Final Result   No acute abnormality of the cervical spine. Multilevel degenerative disc disease with mild multilevel facet arthropathy. CT Head WO Contrast   Final Result   1. No acute intracranial abnormality. 2. Small left forehead contusion/scalp hematoma. 3. 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 4. Senescent changes. XR CHEST PORTABLE   Final Result   No radiographic evidence of acute cardiopulmonary disease.              Assessment/Plan:    Active Hospital Problems    Diagnosis Date Noted    Paralytic ileus of large intestine ([address] [K56.0]     Left arm cellulitis [H26.981] 07/24/2021    Hypoglycemia [E16.2] 07/24/2021    Encephalopathy, metabolic [E50.58]     PAF (paroxysmal atrial fibrillation) (HCC) [I48.0]     Hematoma of scalp [S00.03XA]     Anticoagulated [Z79.01]     Brain mass [G93.89]     Elevated serum creatinine [R79.89]     Lytic lesion of bone on x-ray [M89.9]     CKD (chronic kidney disease) stage 3, GFR 30-59 ml/min ([address] [N18.30] 07/21/2021    Essential hypertension [I10] 07/21/2021    FUO (fever of unknown origin) [R50.9] 07/21/2021    Fecal impaction ([address] [K56.41] 07/21/2021    Dementia ([address] [F03.90] 07/21/2021    History of deep venous thrombosis (DVT) of distal vein of right lower extremity [Z86.718] 07/21/2021    Atelectasis [J98.11] 07/21/2021    S/P placement of cardiac pacemaker [Z95.0] 07/20/2021    Hyponatremia [E87.1]     AV block, Mobitz 2 [I44.1]     Acute kidney injury superimposed on CKD ([facility] [N17.9, N18.9] 07/17/2021    Syncope and collapse [R55] 03/30/2021       Acute Medical Issues Being Addressed:    78-year-old admitted to the hospital with syncope    Syncope probably secondary to dehydration with underlying Mobitz type II second-degree heart block with associated orthostatic hypotension  Diltiazem has been discontinued  EKG showed type II second-degree heart block hence he had dual-chamber pacemaker placed  Had an echocardiogram recently in March which showed normal ejection fraction with mild to moderate AR  On telemetry  Also had positive orthostatics which improved with IV fluids        Acute kidney injury prerenal on baseline of chronic kidney disease stage III  Creatinine 1.7 on admission improved with IV fluids back to baseline of around 1.3  Fattening continues to remain stable    Previous DVT  On chronic anticoagulation Eliquis restarted by cardiology    Previous brain aneurysm and suprasellar mass  Followed up with   CT head shows unchanged  MRI of the brain noted    Constipation mild ileus and gastric distention  He had a CT abdomen and pelvis  Was initially kept n.p.o.   Seen by general surgery  Repeat CT abdomen showed mild gastric distention  Hence GI were consulted  EGD was normal no obstruction noted his diet was advanced all of his symptoms have resolved he is now tolerating a regular diet  Darted on laxatives  We will also give an enema today    Overall patient remains stable he is currently awaiting precertification      DVT Prophylaxis: On Eliquis  Diet: ADULT DIET; Easy to Chew; Low Fiber  Adult Oral Nutrition Supplement; Standard High Calorie/High Protein Oral Supplement  Code Status: DNR-CCA      Dispo - once acute medical processes have resolved    Balbir Saul MD

## 2021-08-01 NOTE — PLAN OF CARE
Problem: Falls - Risk of:  Goal: Will remain free from falls  Description: Will remain free from falls  7/31/2021 2314 by Skip Barbosa RN  Outcome: Ongoing  7/31/2021 1321 by Brandon Hopkins RN  Outcome: Ongoing   Precautions in place

## 2021-08-02 NOTE — CARE COORDINATION
Discharge Plan:     · Patient discharged to: Name: [facility]  · Phone:  [phone]  · Fax ;  [phone]    SW/DC Planner faxed, [address] [place] and AVS   Narcotic Prescriptions faxed were:  N/A  RN: [facility] will call report        Medical Transport with: [facility][note 3]  [phone]   time:  6 pm    Family advised of discharge?:  Yes - Daughter  Mindy Francois?:   Yes   All discharge needs met per case management.

## 2021-08-02 NOTE — CARE COORDINATION
CM received a call from Nanci Powers at [facility] admissions stating that approval has been obtained and accept the patient today. She talked to the patient's daughter,  updated her on the approval and the   time of 6 pm.    The daughter confirmed that pt  has been fully Vaccinated for Covid-19 at the [place].   Nurse to Nurse Report - [phone]  - second floor  Fax;  [phone]

## 2021-08-02 NOTE — PROGRESS NOTES
Data- discharge order received, pt or (appointed legal authority) verbalized agreement to discharge, disposition to [facility] brandon Nohelia Janette Na reviewed and signed by physician. Action- AVS prepared, OLLIE completed/ reported faxed by case management/. Discharge instruction summary: Diet- general, Activity- as tollerated, immunizations reviewed and updated, medications prescriptions to be filled at receiving facility except for the controlled prescriptions to be sent: to facility, Transfer code status: DNR-CCA, LDAs to remain with discharge: none. DME used: walker. Response- Bedside RN to call report to receiving facility. Pt belongings gathered, peripheral IV and cardiac monitoring removed. Disposition to Discharged via cart/stretcher to skilled nursing by EMS transportation, no complications reported. 1. WEIGHT: Admit Weight: 141 lb 1.5 oz (64 kg) (07/17/21 2124)        Today  Weight: 152 lb 1.9 oz (69 kg) (08/02/21 0336)       2.  O2 SAT.: SpO2: 97 % (08/02/21 1545)

## 2021-08-02 NOTE — CARE COORDINATION
Updated therapy notes sent to [facility]  as requested. Awaiting for call back from [address] [place] [address] with Nguyễn for updates.    moved to moved to 6 pm

## 2021-08-02 NOTE — PROGRESS NOTES
Occupational Therapy  Facility/Department: [facility] 3A NURSING  Daily Treatment Note  NAME: Tarun Melissa  : 1940  MRN: [id number]    Date of Service: 2021    Discharge Recommendations: Tarun Mleissa scored a 15/24 on the AM-PAC ADL Inpatient form. Current research shows that an AM-PAC score of 17 or less is typically not associated with a discharge to the patient's home setting. Based on the patient's AM-PAC score and their current ADL deficits, it is recommended that the patient have 3-5 sessions per week of Occupational Therapy at d/c to increase the patient's independence. Please see assessment section for further patient specific details. If patient discharges prior to next session this note will serve as a discharge summary. Please see below for the latest assessment towards goals. OT Equipment Recommendations  Equipment Needed: No  Other: Defer to d/c location    Assessment   Performance deficits / Impairments: Decreased functional mobility ; Decreased ADL status; Decreased safe awareness;Decreased balance;Decreased high-level IADLs  Assessment: Pt is an 78 y.o. M with hx of dementia who had a pacemaker placed . Pt is making progress towards his goals but requires verbal cues and increased time with setup to complete ADL tasks. Pt remains below functional baseline and would benefit from continued acute OT to progress functional outcomes. Treatment Diagnosis: NAMRATA  Prognosis: Fair  Exam: as above  OT Education: OT Role;Plan of Care;Precautions;Orientation;Transfer Training;ADL Adaptive Strategies  Patient Education: Will require repetition  Barriers to Learning: cognition  REQUIRES OT FOLLOW UP: Yes  Activity Tolerance  Activity Tolerance: Patient Tolerated treatment well;Patient limited by fatigue;Treatment limited secondary to medical complications (free text)  Activity Tolerance: /89 supine, drops to 105/65 seated EOB.  Following stand step transfer to recliner, BP decreases to 93/58 with HR increased to 120. Lethargic throughout session, requires VC for initiation and opening eyes during tasks. Safety Devices  Safety Devices in place: Yes  Type of devices: All fall risk precautions in place;Gait belt;Patient at risk for falls;Call light within reach; Left in chair;Chair alarm in place;Nurse notified  Restraints  Initially in place: No         Patient Diagnosis(es): The primary encounter diagnosis was Syncope and collapse. Diagnoses of Hematoma of scalp, initial encounter, Anticoagulated, Brain mass, and Elevated serum creatinine were also pertinent to this visit. has a past medical history of Arthritis, Brain aneurysm, Dementia ([address] Enlarged prostate, Essential hypertension, Foot pain, History of intracranial hemorrhage, Overactive bladder, Pituitary mass ([address] and Right leg DVT ([facility] [address] [facility] has a past surgical history that includes Appendectomy (4426); Mouth surgery (2020); and Upper gastrointestinal endoscopy (N/A, 7/28/2021). Restrictions  Restrictions/Precautions  Restrictions/Precautions: Cardiac, Fall Risk  Required Braces or Orthoses?: Yes  Implants present? : Pacemaker  Position Activity Restriction  Other position/activity restrictions: Marino Nuñez is a 78 y.o. male who presents to the emergency department after a syncopal episode with head injury. Patient has history of dementia. Presents here with his daughter who he lives with and is his caregiver. Daughter states that he was coming out of his room using his cane when all of a sudden he became wobbly and then fell face forward and had a syncopal episode. He is anticoagulated on Eliquis with history of DVT in his right leg. Has history of a brain aneurysm and pituitary mass. Patient denies any pain at this time. Daughter states he has been eating less over the past few days. Has some abrasions over his face.   Denies visual changes, headache, chest pain, shortness breath, abdominal pain, extremity pain, numbness or difficulty moving his extremities. He is alert to person and place but not time. Daughter states this is normal for him with his history of dementia. Subjective   General  Chart Reviewed: Yes  Patient assessed for rehabilitation services?: Yes  Additional Pertinent Hx: hx of dementia  Response to previous treatment: Patient with no complaints from previous session  Family / Caregiver Present: No  Diagnosis: NAMRATA (acute kidney injury) ([facility] [address] [facility]  Subjective  Subjective: Patient supine in bed upon arrival, presents lethargic, agreeable to cotx. Denies pain  General Comment  Comments: Tends to keep eyes closed but will open them when cued to open eyes. Orientation  Orientation  Orientation Level: Oriented to person;Oriented to place    Objective    ADL  Feeding: Verbal cueing;Setup; Increased time to complete  Grooming: Setup; Increased time to complete (brush teeth EOB)  UE Bathing: Stand by assistance; Increased time to complete;Verbal cueing  LE Bathing: Stand by assistance; Increased time to complete;Verbal cueing  UE Dressing:  (gown change)  Additional Comments: seated EOB for ADL as described above, requires increased time secondary to lethargy, eyes closed for majority of tasks but will open if cued        Balance  Sitting Balance: Stand by assistance  Standing Balance: Contact guard assistance  Standing Balance  Time: ~30 seconds  Activity: transfer EOB > recliner  Bed mobility  Supine to Sit: Minimal assistance (HOB elevated, grabs railings)  Scooting: Stand by assistance  Transfers  Stand Step Transfers: 2 Person assistance;Dependent/Total;Minimal assistance (min A x2 EOB > recliner, VC for hand placement)  Sit to stand: Contact guard assistance                       Cognition  Overall Cognitive Status: Exceptions  Arousal/Alertness: Appropriate responses to stimuli  Following Commands: Follows one step commands with repetition; Follows one step commands with increased time; Follows one step commands consistently  Attention Span: Attends with cues to redirect  Memory: Decreased recall of biographical Information;Decreased recall of precautions;Decreased recall of recent events;Decreased short term memory  Safety Judgement: Decreased awareness of need for assistance;Decreased awareness of need for safety  Problem Solving: Assistance required to generate solutions;Assistance required to implement solutions;Assistance required to identify errors made  Insights: Not aware of deficits  Initiation: Requires cues for some  Sequencing: Requires cues for some     Perception  Overall Perceptual Status: [facility]                                   Plan   Plan  Times per week: 3-5x  Times per day: Daily  Current Treatment Recommendations: Functional Mobility Training, Balance Training, Safety Education & Training, Self-Care / ADL, Home Management Training, Patient/Caregiver Education & Training, Endurance Training    AM-PAC Score        AM-PAC Inpatient Daily Activity Raw Score: 15 (08/02/21 1147)  AM-PAC Inpatient ADL T-Scale Score : 34.69 (08/02/21 1147)  ADL Inpatient CMS 0-100% Score: 56.46 (08/02/21 1147)  ADL Inpatient CMS G-Code Modifier : CK (08/02/21 1147)    Goals  Short term goals  Time Frame for Short term goals: d/c  Short term goal 1: Pt will complete LBD with CGA--ongoing 8/2  Short term goal 2: Pt will complete toilet transfer with CGA--ongoing 8/2  Short term goal 3: pt will complete toileting with CGA--ongoing 8/2  Short term goal 4: Pt will complete UB bathing with CGA--SBA 7/22--GOAL MET  Short term goal 5: NEW GOAL: SBA for functional mobility to/from bathroom w/RW-- ongoing 8/2  Long term goals  Time Frame for Long term goals : STGs=LTGs       Therapy Time   Individual Concurrent Group Co-treatment   Time In       1058   Time Out       1129   Minutes       31        Timed Code Treatment Minutes: 31 minutes    Total Treatment Minutes:  31 minutes    SHAVON Meyer    OT provided direct supervision to student, facilitated in making skilled judgements throughout duration of session.     NAUN Tafoya OTR/L [id number]     Clark Troy OT

## 2021-08-02 NOTE — PROGRESS NOTES
Hospitalist Progress Note      PCP: Kiera Arredondo MD    Date of Admission: 7/17/2021    Chief Complaint: Syncope    Hospital Course:   Patient seen this a.m. Denies any chest pain shortness of breath  Had a bowel movement  Oral intake average    Medications:  Reviewed      Exam:    /85   Pulse 118   Temp 97.8 °F (36.6 °C) (Oral)   Resp 16   Ht 5' 4\" (1.626 m)   Wt 152 lb 1.9 oz (69 kg)   SpO2 98%   BMI 26.11 kg/m²     General appearance: No apparent distress, appears stated age and cooperative. HEENT: Pupils equal, round, and reactive to light. Conjunctivae/corneas clear. Bruising on the upper lip bridge of the nose and forehead  Neck: Supple, with full range of motion. No jugular venous distention. Trachea midline. Respiratory:  Normal respiratory effort. Clear to auscultation, bilaterally without RALES/WHEEZES/Rhonchi. Cardiovascular: Regular rate and rhythm with normal S1/S2 without MURMURS, rubs or gallops. Abdomen: Soft, non-tender, non-distended with normal bowel sounds. Musculoskeletal: No clubbing, cyanosis or EDEMA bilaterally. Full range of motion without deformity. Skin: Skin color, texture, turgor normal.  No rashes or lesions. Neurologic:  Neurovascularly intact without any focal sensory/motor deficits. Cranial nerves: II-XII intact, grossly non-focal.  Physical exam unchanged from 8/1      Labs:   Recent Labs     08/02/21  0603   WBC 6.8   HGB 12.8*   HCT 37.8*        Recent Labs     07/31/21  0626 08/01/21  0429 08/02/21  0603   * 138 138   K 3.8 4.0 3.9    106 104   CO2 23 22 26   BUN 7 8 11   CREATININE 1.2 1.3 1.5*   CALCIUM 9.0 8.9 9.1     No results for input(s): AST, ALT, BILIDIR, BILITOT, ALKPHOS in the last 72 hours. No results for input(s): INR in the last 72 hours. No results for input(s): Aubrie Foster in the last 72 hours.     Urinalysis:      Lab Results   Component Value Date    NITRU Negative 07/21/2021    WBCUA 1 07/21/2021 RBCUA 3 07/21/2021    BLOODU Negative 07/21/2021    SPECGRAV 1.015 07/21/2021    GLUCOSEU Negative 07/21/2021       Radiology:  XR ABDOMEN (KUB) (SINGLE AP VIEW)   Final Result   1. Increase passage of contrast from stomach to small bowel with some   residual contrast seen in the stomach fundus. 2. Dilated loops of air-filled large bowel representing ileus or partial   obstruction. XR ABDOMEN (KUB) (SINGLE AP VIEW)   Final Result   This is progressed compared to prior study where it all this in the stomach. RECOMMENDATION:   Partial emptying of the stomach with some contrast seen within a few proximal   small bowel loops. Residual contrast is still seen within the gastric   fundus, incompletely included on these images. Moderate gaseous distension of the distal stomach. FL UGI   Final Result   Markedly distended stomach. Stomach emptying was not visualized during the   study. Stomach is markedly distended. Given the lack of stomach emptying,   delayed radiographs are recommended to assess for contrast emptying from the   stomach. No obstructing mass in the esophagus         CT ABDOMEN PELVIS W IV CONTRAST Additional Contrast? Oral   Final Result   Minimal gastric distention. No obstructive lesion is appreciated. Cholelithiasis. Trace pericardial effusion. Small bilateral pleural effusions with bibasilar   atelectasis. Mild hypertrophy of the prostate gland. XR ABDOMEN (2 VIEWS)   Final Result   Decreased gastric distension         XR ABDOMEN (KUB) (SINGLE AP VIEW)   Final Result   Stable appearance of the abdomen with gaseous distention of the stomach and   scattered nondistended air-filled loops of bowel throughout. XR ABDOMEN (KUB) (SINGLE AP VIEW)   Final Result   Moderate gastric distension. Otherwise nonspecific bowel-gas pattern.    Improved distention of the sigmoid colon         XR CHEST PORTABLE   Final Result   Improved aeration of the lung bases. Bandlike opacity remains on the right,   likely atelectasis given its bandlike morphology         XR ABDOMEN (KUB) (SINGLE AP VIEW)   Final Result   Slowly resolving constipation with decreasing bowel dilatation which could   represent a resolving partial obstruction or ileus. NM MYOCARDIAL SPECT REST EXERCISE OR RX   Final Result      XR ABDOMEN (KUB) (SINGLE AP VIEW)   Final Result   Large amount of stool in the colon suggests constipation. Dilated air-filled   loops of small bowel and colon in association. Fecal impaction may be   considered clinically. CT CHEST WO CONTRAST   Final Result   De pendant opacity and bandlike opacities are seen at the lung bases, with   atelectasis favored over pneumonia. Trace pleural effusions are seen. Lytic and sclerotic appearing T9 vertebral body. Some certain measuring not   this represents a typical hemangioma common etiology such as Paget's disease,   or metastatic disease. Consider further characterization with bone scan. CT HEAD WO CONTRAST   Final Result   No acute intracranial abnormality. 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 Enlarging left frontal scalp hematoma. XR CHEST PORTABLE   Final Result   Interval development of bibasilar airspace disease, atelectasis favored over   pneumonia. XR CHEST PORTABLE   Final Result   No acute cardiopulmonary disease. No left-sided pneumothorax following pacer   placement. MRI BRAIN WO CONTRAST   Final Result   1. Left frontal scalp hematoma/edema. No evidence of acute intracranial   hemorrhage. 2. Again seen heterogeneous sellar/suprasellar with mass effect on the optic   chiasm and extension into the left sphenoid sinus. Evaluation is limited   secondary to lack of intravenous contrast (acute kidney injury). 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    3. [facility] Medical Issues Being Addressed:    78-year-old admitted to the hospital with syncope    Syncope probably secondary to dehydration with underlying Mobitz type II second-degree heart block with associated orthostatic hypotension  Diltiazem has been discontinued  EKG showed type II second-degree heart block hence he had dual-chamber pacemaker placed  Had an echocardiogram recently in March which showed normal ejection fraction with mild to moderate AR  On telemetry  Also had positive orthostatics which improved with IV fluids        Acute kidney injury prerenal on baseline of chronic kidney disease stage III  Creatinine 1.7 on admission improved with IV fluids back to baseline of around 1.3  Fattening continues to remain stable    Previous DVT  On chronic anticoagulation Eliquis restarted by cardiology    Previous brain aneurysm and suprasellar mass  Followed up with   CT head shows unchanged  MRI of the brain noted    Constipation mild ileus and gastric distention  He had a CT abdomen and pelvis  Was initially kept n.p.o.   Seen by general surgery  Repeat CT abdomen showed mild gastric distention  Hence GI were consulted  EGD was normal no obstruction noted his diet was advanced all of his symptoms have resolved he is now tolerating a regular diet  He is having bowel movements    Awaiting precertification otherwise medically stable    DVT Prophylaxis: On Eliquis  Diet: ADULT DIET; Easy to Chew; Low Fiber  Adult Oral Nutrition Supplement; Standard High Calorie/High Protein Oral Supplement  Code Status: DNR-CCA      Dispo - once acute medical processes have resolved    Zulma Welch MD

## 2021-08-02 NOTE — CARE COORDINATION
CM called and left VM with call back number with Keyur Felixuck with [facility] checking on the precert status.   Tentative  scheduled for 2 pm .

## 2021-08-02 NOTE — CARE COORDINATION
CM received a call from [address] [place] [address] from [facility] was provided with therapy updates over the phone too. Stated will work on the case and call back with the determination.

## 2021-08-02 NOTE — PROGRESS NOTES
Physical Therapy  Facility/Department: [address] NURSING  Daily Treatment Note  NAME: Triny Aguirre  : 1940  MRN: [id number]    Date of Service: 2021    Discharge Recommendations:Paco LUTZ scored a 15/24 on the AM-PAC short mobility form. Current research shows that an AM-PAC score of 17 or less is typically not associated with a discharge to the patient's home setting. Based on the patient's AM-PAC score and their current functional mobility deficits, it is recommended that the patient have 3-5 sessions per week of Physical Therapy at d/c to increase the patient's independence. Please see assessment section for further patient specific details. If patient discharges prior to next session this note will serve as a discharge summary. Please see below for the latest assessment towards goals. 3-5 sessions per week   PT Equipment Recommendations  Equipment Needed: Yes  Mobility Devices: [place] Mark: Rolling    Assessment   Body structures, Functions, Activity limitations: Decreased functional mobility ; Decreased balance;Decreased cognition;Decreased endurance;Decreased strength;Decreased ADL status  Assessment: Pt remaining lethargic throughout tx, but alert and responsive during session. Pt bed mob CGA for VC and physical cuing of BLE. SBA while pt sitting on EOB. orthostatic response when pt sitting on EOB, BP dropping to 85/52 and Pulse at 130. compression stocking applied to B LE. Min A 2 person for sit -stand transfer, ambulation and stand to sit in recliner next to bed. Pt tolerated exercise well, elevated feet in recliner when finished. pt would benefit from skilled PT services to promote safe return to PLOF.   Treatment Diagnosis: decreased functional mobility associated with NAMRATA and insertion of pacemaker  Prognosis: Fair  Decision Making: Low Complexity  Clinical Presentation: evolving  PT Education: PT Role;Transfer Training;General Safety;Orientation;Goals;Plan of Patient denies any pain at this time. Daughter states he has been eating less over the past few days. Has some abrasions over his face. Denies visual changes, headache, chest pain, shortness breath, abdominal pain, extremity pain, numbness or difficulty moving his extremities. He is alert to person and place but not time. Daughter states this is normal for him with his history of dementia. Subjective   General  Chart Reviewed: Yes  Response To Previous Treatment: Patient with no complaints from previous session. Family / Caregiver Present: No  Subjective  Subjective: Pt awake in bed upon PT/OT arrival, pt agreeable to tx. lethargic during beginning but more alert during middle of session  General Comment  Comments: Pt denies pain  Pain Screening  Patient Currently in Pain: Denies  Pain Assessment  Schwartz-Hdez Pain Rating: No hurt  Response to Pain Intervention: Patient Satisfied  Vital Signs  Pulse: 124  Heart Rate Source: Monitor  BP: 115/74 (initial reading taken with pt supine with HOB elevated. sitting on EOB 85/52, sitting in recliner after sit-stand transfer 116/74)  BP Location: Right Arm  Orthostatic B/P and Pulse?: Yes  Blood Pressure Lyin/74 (HOB elevated)  Pulse Lyin PER MINUTE  Blood Pressure Sittin/52  Pulse Sittin PER MINUTE  Level of Consciousness: Alert (0)  Patient Currently in Pain: Denies  Orthostatic B/P and Pulse?: Yes  Oxygen Therapy  O2 Device: None (Room air)       Orientation  Orientation  Overall Orientation Status: Impaired  Orientation Level: Oriented to person  Cognition   Cognition  Overall Cognitive Status: Exceptions  Arousal/Alertness: Appropriate responses to stimuli  Following Commands: Follows one step commands with repetition; Follows one step commands with increased time  Attention Span: Attends with cues to redirect  Memory: Decreased recall of biographical Information;Decreased recall of precautions;Decreased recall of recent events;Decreased short term memory  Safety Judgement: Decreased awareness of need for assistance;Decreased awareness of need for safety  Problem Solving: Assistance required to generate solutions;Assistance required to implement solutions;Assistance required to identify errors made  Insights: Not aware of deficits  Initiation: Requires cues for some  Sequencing: Requires cues for some  Objective   Bed mobility  Supine to Sit: Contact guard assistance;Stand by assistance (CGA for cuing of LE, SBA when pt sits upright)  Scooting: Contact guard assistance  Transfers  Sit to Stand: Minimal Assistance (min A 2 person)  Stand to sit: Minimal Assistance (min A 2 person)  Stand Pivot Transfers: Minimal Assistance (Min A 2 person)  Comment: VC for safe hand placement when transferring with RW, instructed pt to push off bed with at least 1 hand. Ambulation  Ambulation?: Yes  WB Status: WBAT  More Ambulation?: No  Ambulation 1  Surface: level tile  Device: Rolling Walker  Assistance: Contact guard assistance;Minimal assistance (CGA for ambulation, Min A 2 person for sitting in recliner)  Quality of Gait: decreased klever, poor foot clearance, shuffling gait, no LOB  Gait Deviations: Slow Klever;Decreased step length  Distance: 4 ft from bed to recliner  Comments: Pt with CGA from EOB to recliner. needed VC for pt to place hands on armrests during sitting. Pt awake and engaged during ambulation, counting steps out loud. Min A of 2 persons for sitting in recliner. Stairs/Curb  Stairs?: No     Balance  Posture: Fair  Sitting - Static: Good;-  Sitting - Dynamic: Fair;+  Standing - Static: Fair  Standing - Dynamic: Fair;-  Comments: Pt able to perform bed mobility with limited assistance, pt able to keep trunk upright during supine to sit. pt also able to lean forward with little assistance  Exercises  Quad Sets: 1 set, 10 reps. sitting position, extending leg at the knee. bilateral  Gluteal Sets: 1 set, 10 reps.  sitting position  Hip Abduction: isometric. 1 set 8 reps, pt sitting. hold for 6 seconds, rest for 10. Ankle Pumps: 1 set, 15 reps. pt sitting, bilateral.   AROM RLE (degrees)  RLE AROM: WFL  AROM LLE (degrees)  LLE AROM : WFL  Strength RLE  Strength RLE: WFL  Strength LLE  Strength LLE: WFL  Strength RUE  Strength RUE: WFL  Strength LUE  Strength LUE: WFL                 G-Code     OutComes Score                                                     AM-PAC Score  AM-PAC Inpatient Mobility Raw Score : 16 (08/02/21 0954)  AM-PAC Inpatient T-Scale Score : 40.78 (08/02/21 0954)  Mobility Inpatient CMS 0-100% Score: 54.16 (08/02/21 0954)  Mobility Inpatient CMS G-Code Modifier : CK (08/02/21 0954)          Goals  Short term goals  Time Frame for Short term goals: prior to discharge  Short term goal 1: pt will be able to ambulate with handheld/cane 20ft with CG  Short term goal 2: pt will be able to ambulate flight of stairs with cane and Gavin  Short term goal 3: pt will be able to sit to/from stand independently  Short term goal 4: pt will be able to perform bed mobility independently  Patient Goals   Patient goals : return home   **none met this date    Plan    Plan  Times per week: 3-5  Times per day: Daily  Current Treatment Recommendations: Transfer Training, Endurance Training, Strengthening, ROM, Balance Training, Gait Training, Functional Mobility Training, Cognitive Reorientation, Patient/Caregiver Education & Training, Neuromuscular Re-education, Safety Education & Training, Home Exercise Program  Safety Devices  Type of devices:  All fall risk precautions in place, Call light within reach, Gait belt, Patient at risk for falls, Nurse notified, Left in chair, Chair alarm in place  Restraints  Initially in place: No     Therapy Time   Individual Concurrent Group Co-treatment   Time In 0842         Time Out 0915         Minutes 33         Timed Code Treatment Minutes: [place] , [place]  PT providing direct supervision during session and assisting in making skilled judgements throughout session.   Igor Card, PT, DPT, [id number]  Igor Card, PT

## 2021-08-02 NOTE — CARE COORDINATION
CM received a call from [facility] at [facility] , stating that [facility]  is requesting for updated therapy notes. Therapy informed, working on it. Will continue to follow.

## 2021-08-02 NOTE — PROGRESS NOTES
Physical Therapy  Facility/Department: [address] NURSING  Daily Treatment Note (2nd session)  NAME: Leo Engel  : 1940  MRN: [id number]    Date of Service: 2021    Discharge Recommendations: Leo Engel scored a 15/24 on the AM-PAC short mobility form. Current research shows that an AM-PAC score of 17 or less is typically not associated with a discharge to the patient's home setting. Based on the patient's AM-PAC score and their current functional mobility deficits, it is recommended that the patient have 3-5 sessions per week of Physical Therapy at d/c to increase the patient's independence. Please see assessment section for further patient specific details. If patient discharges prior to next session this note will serve as a discharge summary. Please see below for the latest assessment towards goals. PT Equipment Recommendations  Equipment Needed: Yes  Mobility Devices: [place] Felix: Rolling    Assessment   Body structures, Functions, Activity limitations: Decreased functional mobility ; Decreased balance;Decreased cognition;Decreased endurance;Decreased strength;Decreased ADL status  Assessment: Pt required decreased assist for bed mobility this date however continues to require 2 person assist with transfers. Mobility limited to transfer to chair this date due to orthostatic BP readings. Pt reporting no symptoms despite BP readings. Pt would benefit from continued skilled PT services to address deficits and facilitate return to PLOF. Treatment Diagnosis: decreased functional mobility associated with NAMRATA and insertion of pacemaker  Prognosis: Fair  Decision Making: Medium Complexity  Clinical Presentation: evolving  PT Education: PT Role;Transfer Training;General Safety;Orientation;Goals;Plan of Care;Precautions; Energy Conservation;Gait Training;Functional Mobility Training  Patient Education: d/c recommendations--pt verbalized understanding, will require reinforcement  Barriers to Learning: cognitive  REQUIRES PT FOLLOW UP: Yes  Activity Tolerance  Activity Tolerance: Patient limited by endurance; Patient limited by fatigue  Activity Tolerance: Required increased time to perform tasks, orthostatic BP taken throughout session and documented above. Pt reporting no symptoms despite readings. Patient Diagnosis(es): The primary encounter diagnosis was Syncope and collapse. Diagnoses of Hematoma of scalp, initial encounter, Anticoagulated, Brain mass, and Elevated serum creatinine were also pertinent to this visit. has a past medical history of Arthritis, Brain aneurysm, Dementia ([address] Enlarged prostate, Essential hypertension, Foot pain, History of intracranial hemorrhage, Overactive bladder, Pituitary mass ([address] and Right leg DVT ([address] has a past surgical history that includes Appendectomy (2195); Mouth surgery (2020); and Upper gastrointestinal endoscopy (N/A, 7/28/2021). Restrictions  Restrictions/Precautions  Restrictions/Precautions: Cardiac, Fall Risk  Required Braces or Orthoses?: Yes  Implants present? : Pacemaker  Position Activity Restriction  Other position/activity restrictions: Kaleb Pool is a 78 y.o. male who presents to the emergency department after a syncopal episode with head injury. Patient has history of dementia. Presents here with his daughter who he lives with and is his caregiver. Daughter states that he was coming out of his room using his cane when all of a sudden he became wobbly and then fell face forward and had a syncopal episode. He is anticoagulated on Eliquis with history of DVT in his right leg. Has history of a brain aneurysm and pituitary mass. Patient denies any pain at this time. Daughter states he has been eating less over the past few days. Has some abrasions over his face. Denies visual changes, headache, chest pain, shortness breath, abdominal pain, extremity pain, numbness or difficulty moving his extremities.   He is alert to person and place but not time. Daughter states this is normal for him with his history of dementia. Subjective   General  Chart Reviewed: Yes  Response To Previous Treatment: Patient with no complaints from previous session. Family / Caregiver Present: No  Subjective  Subjective: Pt supine in bed upon arrival, agreeable to therapy session. Pt reporting no pain at this time. General Comment  Comments: Cotx with OT for safety with mobility. Pain Screening  Patient Currently in Pain: Denies  Vital Signs  Orthostatic B/P and Pulse?: Yes  Blood Pressure Lyin/89  Pulse Lyin PER MINUTE  Blood Pressure Sittin/65 (unable to obtain standing BP, once seated in chair BP was: 93/58)  Pulse Sittin PER MINUTE (HR seated in chair: 120)  Patient Currently in Pain: Denies  Orthostatic B/P and Pulse?: Yes       Orientation  Orientation  Overall Orientation Status: Impaired  Orientation Level: Oriented to person;Disoriented to place; Disoriented to time;Disoriented to situation     Cognition   Cognition  Overall Cognitive Status: Exceptions  Arousal/Alertness: Appropriate responses to stimuli  Following Commands: Follows one step commands with repetition; Follows one step commands with increased time; Follows one step commands consistently  Attention Span: Attends with cues to redirect  Memory: Decreased recall of biographical Information;Decreased recall of precautions;Decreased recall of recent events;Decreased short term memory  Safety Judgement: Decreased awareness of need for assistance;Decreased awareness of need for safety  Problem Solving: Assistance required to generate solutions;Assistance required to implement solutions;Assistance required to identify errors made  Insights: Not aware of deficits  Initiation: Requires cues for some  Sequencing: Requires cues for some     Objective   Bed mobility  Supine to Sit: Minimal assistance (HOB raised)  Scooting: Stand by assistance  Transfers  Sit to Stand: Contact guard assistance  Stand to sit: Contact guard assistance  Bed to Chair: Dependent/Total  Stand Pivot Transfers: Dependent/Total  Comment: Min Ax2 for pivot EOB>chair, mod VC for safe hand placement with min carryover noted. Ambulation  Ambulation?: No  Stairs/Curb  Stairs?: No     Balance  Posture: Fair  Sitting - Static: Good;-  Sitting - Dynamic: -;Good (SBA seated at EOB during ADLs ~10-15 minutes total)  Standing - Static: Fair (Min A standing with UE support)  Standing - Dynamic: Fair (Min Ax2 for transfer)            G-Code     OutComes Score    AM-PAC Score  AM-PAC Inpatient Mobility Raw Score : 15 (08/02/21 1144)  AM-PAC Inpatient T-Scale Score : 39.45 (08/02/21 1144)  Mobility Inpatient CMS 0-100% Score: 57.7 (08/02/21 1144)  Mobility Inpatient CMS G-Code Modifier : CK (08/02/21 1144)          Goals  Short term goals  Time Frame for Short term goals: prior to discharge  Short term goal 1: pt will be able to ambulate with handheld/cane 20ft with CG  Short term goal 2: pt will be able to ambulate flight of stairs with cane and Gavin  Short term goal 3: pt will be able to sit to/from stand independently  Short term goal 4: pt will be able to perform bed mobility independently  Patient Goals   Patient goals : return home  NO GOALS MET THIS [address]  Times per week: 3-5  Times per day: Daily  Current Treatment Recommendations: Transfer Training, Endurance Training, Strengthening, ROM, Balance Training, Gait Training, Functional Mobility Training, Cognitive Reorientation, Patient/Caregiver Education & Training, Neuromuscular Re-education, Safety Education & Training, Home Exercise Program  Safety Devices  Type of devices:  All fall risk precautions in place, Call light within reach, Gait belt, Patient at risk for falls, Nurse notified, Left in chair, Chair alarm in place  Restraints  Initially in place: No     Therapy Time   Individual Concurrent Group Co-treatment   Time In      [phone] Time Out      1129   Minutes      31      Timed Code Treatment Minutes:  31  Total Treatment Minutes:  [address], [address] [place], [place] [address]

## 2021-08-24 NOTE — PROGRESS NOTES
We received remote transmission from patient's monitor at home. Transmission shows normal sensing and pacing function. Noted AT. EP physician will review. See interrogation under cardiology tab in the [facility] [address] field for more details.

## 2022-06-17 NOTE — PROCEDURES
[address]     Electrophysiology Procedure Note       Date of Procedure: 7/19/2021  Patient's Name: Eileen Perez  YOB: 1940   Medical Record Number: [id number]  Referring Physician: Rola Rodas MD  Procedure Performed by: Rosette Castellanos MD    Procedures performed:     · Insertion of MRI compatible right ventricular  lead under fluoroscopy. · Insertion of MRI compatible right atrial lead under fluoroscopy  · Insertion of a MRI compatible  dual  chamber Pacemaker/  · IV sedation. · Ultrasound for access  · Programming and analysis of the device      Indication of the procedure: Non-reversib    Eileen Perez is a 78 y.o. male with   presented to the hospital with syncope and found to have bradycardia/Mobitz type II. 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 Details of procedure: The patient was brought to the electrophysiology laboratory in stable condition. The patient was in a fasting and non-sedated state. The risks, benefits and alternatives of the procedure were discussed with the patient and patient power of  (daughter). The risks including, but not limited to, the risks of vascular injury, bleeding, infection, device malfunction, lead dislodgement, radiation exposure, injury to cardiac and surrounding structures (including pneumothorax), stroke, myocardial infarction and death were discussed in detail. Patient opted to proceed with the device implantation. Written informed consent was signed and placed in the chart. Prophylactic antibiotic was given. Venogram was used for IV access      An independent trained observer pushed medications at my direction.  We monitored the patient's level of consciousness and vital signs/physiologic status throughout the procedure duration (see start and stop times below). Sedation:  1 mg Versed, 75 mcg Fentanyl  Sedation start: 1800  Sedation stop: 1849          The patient was prepped and draped in a sterile fashion. A timeout protocol was completed to identify the patient and the procedure being performed. Pre-sedation evaluation and airway assessment (Mallampatti classification, class lI) was completed. IV sedation was provided with IV Versed, Fentanyl. An incision was made in the left pectoral area after administration of lidocaine. Using electrocautery and blunt dissection, a pocket was created. Central venous access into the left axillary vein was obtained using the modified Seldinger technique. After central venous access was obtained, a sheath was placed in the axillary vein. A right ventricular pacing lead was advanced into position on the septum  under fluoroscopic guidance and using a series of curved stylets. The lead was actively fixated. After confirming appropriate function, the sheath was split and removed. The lead was secured to the underlying tissue using suture material.     A new sheath was advanced over a second previously placed wire into the vein. The atrial lead was advanced to the right atrial appendage under fluoroscopic guidance. The lead was actively fixated. After confirming appropriate function, the sheath was split and removed. The lead was secured to the underlying tissue using suture. The pocket was irrigated with saline solution. The leads were then connected to the new pulse generator,  dual/ chamber pacemaker, which was then placed into the cleaned pocket. The pulse generator was sutured inside the pocket. The pocket was then closed in three separate subcutaneous layers using  3-0 Vicryl and subcuticular layer using 4-0 Vicryl . The skin was covered with pressure dressing. Steristrips was used on the skin.          All sponge and needle Elio Sanchez

## 2022-10-30 NOTE — PROGRESS NOTES
Pharmacy consulted to dose warfarin. I called the [place] [facility] and spoke to Irving. He told me that he has not been on warfarin but does take apixaban 5 mg twice daily. I entered this order on his profile. no

## (undated) DEVICE — SET VLV 3 PC AWS DISPOSABLE GRDIAN SCOPEVALET

## (undated) DEVICE — BW-412T DISP COMBO CLEANING BRUSH: Brand: SINGLE USE COMBINATION CLEANING BRUSH

## (undated) DEVICE — MOUTHPIECE ENDOSCP L CTRL OPN AND SIDE PORTS DISP

## (undated) DEVICE — GOWN AURORA NONREINF LG: Brand: MEDLINE INDUSTRIES, INC.

## (undated) DEVICE — PROCEDURE KIT ENDOSCP CUST

## (undated) DEVICE — SOLUTION IV IRRIG WATER 500ML POUR BRL ST 2F7113